# Patient Record
Sex: FEMALE | Race: WHITE | Employment: OTHER | URBAN - METROPOLITAN AREA
[De-identification: names, ages, dates, MRNs, and addresses within clinical notes are randomized per-mention and may not be internally consistent; named-entity substitution may affect disease eponyms.]

---

## 2017-10-15 ENCOUNTER — APPOINTMENT (OUTPATIENT)
Dept: GENERAL RADIOLOGY | Age: 82
DRG: 375 | End: 2017-10-15
Attending: EMERGENCY MEDICINE
Payer: MEDICARE

## 2017-10-15 ENCOUNTER — APPOINTMENT (OUTPATIENT)
Dept: CT IMAGING | Age: 82
DRG: 375 | End: 2017-10-15
Attending: EMERGENCY MEDICINE
Payer: MEDICARE

## 2017-10-15 ENCOUNTER — HOSPITAL ENCOUNTER (INPATIENT)
Age: 82
LOS: 5 days | Discharge: HOME HEALTH | DRG: 375 | End: 2017-10-20
Attending: EMERGENCY MEDICINE | Admitting: INTERNAL MEDICINE
Payer: MEDICARE

## 2017-10-15 DIAGNOSIS — R18.8 OTHER ASCITES: Primary | ICD-10-CM

## 2017-10-15 DIAGNOSIS — J90 PLEURAL EFFUSION: ICD-10-CM

## 2017-10-15 DIAGNOSIS — Z91.89 AT HIGH RISK FOR BREAST CANCER: ICD-10-CM

## 2017-10-15 DIAGNOSIS — J18.9 PNEUMONIA OF LEFT LOWER LOBE DUE TO INFECTIOUS ORGANISM: ICD-10-CM

## 2017-10-15 LAB
ALBUMIN SERPL-MCNC: 3.2 G/DL (ref 3.5–5)
ALBUMIN/GLOB SERPL: 0.9 {RATIO} (ref 1.1–2.2)
ALP SERPL-CCNC: 64 U/L (ref 45–117)
ALT SERPL-CCNC: 18 U/L (ref 12–78)
ANION GAP SERPL CALC-SCNC: 7 MMOL/L (ref 5–15)
APPEARANCE UR: ABNORMAL
AST SERPL-CCNC: 18 U/L (ref 15–37)
BACTERIA URNS QL MICRO: ABNORMAL /HPF
BASOPHILS # BLD: 0 K/UL (ref 0–0.1)
BASOPHILS NFR BLD: 0 % (ref 0–1)
BILIRUB SERPL-MCNC: 0.7 MG/DL (ref 0.2–1)
BILIRUB UR QL CFM: NEGATIVE
BNP SERPL-MCNC: 532 PG/ML (ref 0–450)
BUN SERPL-MCNC: 20 MG/DL (ref 6–20)
BUN/CREAT SERPL: 15 (ref 12–20)
CALCIUM SERPL-MCNC: 8.5 MG/DL (ref 8.5–10.1)
CHLORIDE SERPL-SCNC: 104 MMOL/L (ref 97–108)
CK MB CFR SERPL CALC: NORMAL % (ref 0–2.5)
CK MB SERPL-MCNC: <1 NG/ML (ref 5–25)
CK SERPL-CCNC: 45 U/L (ref 26–192)
CO2 SERPL-SCNC: 27 MMOL/L (ref 21–32)
COLOR UR: ABNORMAL
CREAT SERPL-MCNC: 1.31 MG/DL (ref 0.55–1.02)
EOSINOPHIL # BLD: 0.3 K/UL (ref 0–0.4)
EOSINOPHIL NFR BLD: 5 % (ref 0–7)
EPITH CASTS URNS QL MICRO: ABNORMAL /LPF
ERYTHROCYTE [DISTWIDTH] IN BLOOD BY AUTOMATED COUNT: 14.1 % (ref 11.5–14.5)
GLOBULIN SER CALC-MCNC: 3.7 G/DL (ref 2–4)
GLUCOSE SERPL-MCNC: 109 MG/DL (ref 65–100)
GLUCOSE UR STRIP.AUTO-MCNC: NEGATIVE MG/DL
HCT VFR BLD AUTO: 36.2 % (ref 35–47)
HGB BLD-MCNC: 11.5 G/DL (ref 11.5–16)
HGB UR QL STRIP: NEGATIVE
KETONES UR QL STRIP.AUTO: NEGATIVE MG/DL
LACTATE SERPL-SCNC: 2.4 MMOL/L (ref 0.4–2)
LACTATE SERPL-SCNC: 2.7 MMOL/L (ref 0.4–2)
LEUKOCYTE ESTERASE UR QL STRIP.AUTO: NEGATIVE
LYMPHOCYTES # BLD: 1.5 K/UL (ref 0.8–3.5)
LYMPHOCYTES NFR BLD: 26 % (ref 12–49)
MAGNESIUM SERPL-MCNC: 2 MG/DL (ref 1.6–2.4)
MCH RBC QN AUTO: 29.5 PG (ref 26–34)
MCHC RBC AUTO-ENTMCNC: 31.8 G/DL (ref 30–36.5)
MCV RBC AUTO: 92.8 FL (ref 80–99)
MONOCYTES # BLD: 0.4 K/UL (ref 0–1)
MONOCYTES NFR BLD: 6 % (ref 5–13)
MUCOUS THREADS URNS QL MICRO: ABNORMAL /LPF
NEUTS SEG # BLD: 3.8 K/UL (ref 1.8–8)
NEUTS SEG NFR BLD: 63 % (ref 32–75)
NITRITE UR QL STRIP.AUTO: NEGATIVE
PH UR STRIP: 6 [PH] (ref 5–8)
PLATELET # BLD AUTO: 370 K/UL (ref 150–400)
POTASSIUM SERPL-SCNC: 3.8 MMOL/L (ref 3.5–5.1)
PROT SERPL-MCNC: 6.9 G/DL (ref 6.4–8.2)
PROT UR STRIP-MCNC: ABNORMAL MG/DL
RBC # BLD AUTO: 3.9 M/UL (ref 3.8–5.2)
RBC #/AREA URNS HPF: ABNORMAL /HPF (ref 0–5)
SODIUM SERPL-SCNC: 138 MMOL/L (ref 136–145)
SP GR UR REFRACTOMETRY: 1.03 (ref 1–1.03)
TROPONIN I SERPL-MCNC: <0.04 NG/ML
UROBILINOGEN UR QL STRIP.AUTO: 1 EU/DL (ref 0.2–1)
WBC # BLD AUTO: 6 K/UL (ref 3.6–11)
WBC URNS QL MICRO: ABNORMAL /HPF (ref 0–4)

## 2017-10-15 PROCEDURE — 83605 ASSAY OF LACTIC ACID: CPT | Performed by: EMERGENCY MEDICINE

## 2017-10-15 PROCEDURE — 84484 ASSAY OF TROPONIN QUANT: CPT | Performed by: EMERGENCY MEDICINE

## 2017-10-15 PROCEDURE — 94762 N-INVAS EAR/PLS OXIMTRY CONT: CPT

## 2017-10-15 PROCEDURE — 94640 AIRWAY INHALATION TREATMENT: CPT

## 2017-10-15 PROCEDURE — 93005 ELECTROCARDIOGRAM TRACING: CPT

## 2017-10-15 PROCEDURE — 96367 TX/PROPH/DG ADDL SEQ IV INF: CPT

## 2017-10-15 PROCEDURE — 77030029684 HC NEB SM VOL KT MONA -A

## 2017-10-15 PROCEDURE — 82550 ASSAY OF CK (CPK): CPT | Performed by: EMERGENCY MEDICINE

## 2017-10-15 PROCEDURE — 74011250636 HC RX REV CODE- 250/636

## 2017-10-15 PROCEDURE — 65270000029 HC RM PRIVATE

## 2017-10-15 PROCEDURE — 85025 COMPLETE CBC W/AUTO DIFF WBC: CPT | Performed by: EMERGENCY MEDICINE

## 2017-10-15 PROCEDURE — 96365 THER/PROPH/DIAG IV INF INIT: CPT

## 2017-10-15 PROCEDURE — 74011250636 HC RX REV CODE- 250/636: Performed by: EMERGENCY MEDICINE

## 2017-10-15 PROCEDURE — 36415 COLL VENOUS BLD VENIPUNCTURE: CPT | Performed by: EMERGENCY MEDICINE

## 2017-10-15 PROCEDURE — 96375 TX/PRO/DX INJ NEW DRUG ADDON: CPT

## 2017-10-15 PROCEDURE — 74011000250 HC RX REV CODE- 250: Performed by: EMERGENCY MEDICINE

## 2017-10-15 PROCEDURE — 83880 ASSAY OF NATRIURETIC PEPTIDE: CPT | Performed by: EMERGENCY MEDICINE

## 2017-10-15 PROCEDURE — 87040 BLOOD CULTURE FOR BACTERIA: CPT | Performed by: EMERGENCY MEDICINE

## 2017-10-15 PROCEDURE — 80053 COMPREHEN METABOLIC PANEL: CPT | Performed by: EMERGENCY MEDICINE

## 2017-10-15 PROCEDURE — 74011000258 HC RX REV CODE- 258: Performed by: EMERGENCY MEDICINE

## 2017-10-15 PROCEDURE — 83735 ASSAY OF MAGNESIUM: CPT | Performed by: EMERGENCY MEDICINE

## 2017-10-15 PROCEDURE — 74176 CT ABD & PELVIS W/O CONTRAST: CPT

## 2017-10-15 PROCEDURE — 81001 URINALYSIS AUTO W/SCOPE: CPT | Performed by: EMERGENCY MEDICINE

## 2017-10-15 PROCEDURE — 71010 XR CHEST PORT: CPT

## 2017-10-15 PROCEDURE — 99285 EMERGENCY DEPT VISIT HI MDM: CPT

## 2017-10-15 RX ORDER — OXYCODONE AND ACETAMINOPHEN 5; 325 MG/1; MG/1
1 TABLET ORAL
Status: DISCONTINUED | OUTPATIENT
Start: 2017-10-15 | End: 2017-10-20 | Stop reason: HOSPADM

## 2017-10-15 RX ORDER — SODIUM CHLORIDE 0.9 % (FLUSH) 0.9 %
5-10 SYRINGE (ML) INJECTION AS NEEDED
Status: DISCONTINUED | OUTPATIENT
Start: 2017-10-15 | End: 2017-10-20 | Stop reason: HOSPADM

## 2017-10-15 RX ORDER — SODIUM CHLORIDE 0.9 % (FLUSH) 0.9 %
5-10 SYRINGE (ML) INJECTION EVERY 8 HOURS
Status: DISCONTINUED | OUTPATIENT
Start: 2017-10-15 | End: 2017-10-20 | Stop reason: HOSPADM

## 2017-10-15 RX ORDER — AZITHROMYCIN 250 MG/1
250 TABLET, FILM COATED ORAL DAILY
Status: DISCONTINUED | OUTPATIENT
Start: 2017-10-16 | End: 2017-10-18

## 2017-10-15 RX ORDER — ALBUTEROL SULFATE 2.5 MG/.5ML
5 SOLUTION RESPIRATORY (INHALATION)
Status: COMPLETED | OUTPATIENT
Start: 2017-10-15 | End: 2017-10-15

## 2017-10-15 RX ORDER — ONDANSETRON 2 MG/ML
INJECTION INTRAMUSCULAR; INTRAVENOUS
Status: COMPLETED
Start: 2017-10-15 | End: 2017-10-15

## 2017-10-15 RX ORDER — PANTOPRAZOLE SODIUM 40 MG/1
40 TABLET, DELAYED RELEASE ORAL DAILY
Status: DISCONTINUED | OUTPATIENT
Start: 2017-10-16 | End: 2017-10-20 | Stop reason: HOSPADM

## 2017-10-15 RX ORDER — IPRATROPIUM BROMIDE AND ALBUTEROL SULFATE 2.5; .5 MG/3ML; MG/3ML
3 SOLUTION RESPIRATORY (INHALATION)
Status: DISCONTINUED | OUTPATIENT
Start: 2017-10-15 | End: 2017-10-17

## 2017-10-15 RX ORDER — NALOXONE HYDROCHLORIDE 0.4 MG/ML
0.4 INJECTION, SOLUTION INTRAMUSCULAR; INTRAVENOUS; SUBCUTANEOUS AS NEEDED
Status: DISCONTINUED | OUTPATIENT
Start: 2017-10-15 | End: 2017-10-20 | Stop reason: HOSPADM

## 2017-10-15 RX ORDER — ACETAMINOPHEN 325 MG/1
650 TABLET ORAL
Status: DISCONTINUED | OUTPATIENT
Start: 2017-10-15 | End: 2017-10-20 | Stop reason: HOSPADM

## 2017-10-15 RX ORDER — ONDANSETRON 2 MG/ML
4 INJECTION INTRAMUSCULAR; INTRAVENOUS
Status: DISCONTINUED | OUTPATIENT
Start: 2017-10-15 | End: 2017-10-20 | Stop reason: HOSPADM

## 2017-10-15 RX ORDER — AMLODIPINE BESYLATE 2.5 MG/1
2.5 TABLET ORAL DAILY
Status: DISCONTINUED | OUTPATIENT
Start: 2017-10-16 | End: 2017-10-20 | Stop reason: HOSPADM

## 2017-10-15 RX ADMIN — CEFTRIAXONE 1 G: 1 INJECTION, POWDER, FOR SOLUTION INTRAMUSCULAR; INTRAVENOUS at 17:32

## 2017-10-15 RX ADMIN — ONDANSETRON 4 MG: 2 INJECTION INTRAMUSCULAR; INTRAVENOUS at 18:57

## 2017-10-15 RX ADMIN — AZITHROMYCIN MONOHYDRATE 500 MG: 500 INJECTION, POWDER, LYOPHILIZED, FOR SOLUTION INTRAVENOUS at 18:27

## 2017-10-15 RX ADMIN — ALBUTEROL SULFATE 5 MG: 2.5 SOLUTION RESPIRATORY (INHALATION) at 12:53

## 2017-10-15 RX ADMIN — SODIUM CHLORIDE 2700 ML: 900 INJECTION, SOLUTION INTRAVENOUS at 19:03

## 2017-10-15 NOTE — IP AVS SNAPSHOT
[address] [facility] 
[phone] Patient: Addison Wahl MRN: [id number] :[id number] You are allergic to the following Allergen Reactions Valsartan Other (comments)  
 malaise Immunizations Administered for This Admission Name Date Influenza Vaccine (Quad) PF  Deferred () Recent Documentation Height Weight BMI OB Status Smoking Status 1.6 m 83.1 kg 32.45 kg/m2 Hysterectomy Never Smoker Emergency Contacts  (Rel.) Home Phone Work Phone Mobile Phone Asif Carl (Child) [phone] -- --  
 Ana Fisher [phone] -- -- About your hospitalization You were admitted on:  October 15, 2017 You last received care in the:  [address] You were discharged on:  2017 Why you were hospitalized Your primary diagnosis was:  Not on File Your diagnoses also included:  Ascites Providers Seen During Your Hospitalizations Provider Role Specialty Primary office phone Miguel Gonzalez DO Attending Provider Emergency Medicine [phone] Sarah Rodriguez MD Attending Provider Hospitalist [phone] Kamryn Mckeon MD Attending Provider Internal Medicine [phone] Your Primary Care Physician (PCP) Primary Care Physician Office Phone Office Fax Lauren Wagner [phone] Follow-up Information Follow up With Details Comments Contact Info Gennaro Aguayo MD In 1 week As needed [address] [facility] 
[phone] Wilber Rincon MD On 2017 for Chemotherapy plans [address] [place] [address] 2 [address] [facility] 
[phone] Ama Dyer MD In 3 days outpatient biopsy of omental nodule with a repeat paracentesis in IR for early next week  [address] [facility] 
[phone] Current Discharge Medication List  
  
CONTINUE these medications which have CHANGED Dose & Instructions Dispensing Information Comments Morning Noon Evening Bedtime  
 furosemide 40 mg tablet Commonly known as:  LASIX What changed:   
- medication strength 
- how much to take - when to take this Your last dose was: Your next dose is:    
   
   
 Dose:  40 mg Take 1 Tab by mouth daily. Quantity:  30 Tab Refills:  0 CONTINUE these medications which have NOT CHANGED Dose & Instructions Dispensing Information Comments Morning Noon Evening Bedtime  
 amLODIPine 2.5 mg tablet Commonly known as:  Kaci Citron Your last dose was: Your next dose is:    
   
   
 Dose:  2.5 mg Take 2.5 mg by mouth daily. Refills:  0  
     
   
   
   
  
 aspirin 81 mg chewable tablet Your last dose was: Your next dose is:    
   
   
 Dose:  81 mg Take 81 mg by mouth daily. Refills:  0  
     
   
   
   
  
 FISH OIL 1,000 mg Cap Generic drug:  omega-3 fatty acids-vitamin e Your last dose was: Your next dose is:    
   
   
 Dose:  1 Cap Take 1 Cap by mouth daily. Refills:  0 KLOR-CON 10 10 mEq tablet Generic drug:  potassium chloride SR Your last dose was: Your next dose is:    
   
   
 Dose:  10 mEq Take 10 mEq by mouth daily. Refills:  0  
     
   
   
   
  
 magnesium oxide 400 mg tablet Commonly known as:  MAG-OX Your last dose was: Your next dose is:    
   
   
 Dose:  400 mg Take 400 mg by mouth daily. Refills:  0 PROTONIX 40 mg tablet Generic drug:  pantoprazole Your last dose was: Your next dose is:    
   
   
 Dose:  40 mg Take 40 mg by mouth daily. Refills:  0 Vytorin 10/10 10-10 mg per tablet Generic drug:  ezetimibe-simvastatin Your last dose was: Your next dose is:    
   
   
 Dose:  1 Tab Take 1 Tab by mouth nightly. Refills:  0 Where to Get Your Medications Information on where to get these meds will be given to you by the nurse or doctor. ! Ask your nurse or doctor about these medications  
  furosemide 40 mg tablet Discharge Instructions HOSPITALIST DISCHARGE INSTRUCTIONS 
 
NAME: Gildardo Deluca :  1927 MRN:  [id number] Date/Time:  10/20/2017 4:17 PM 
 
ADMIT DATE: 10/15/2017 DISCHARGE DATE: 10/20/2017 DISCHARGE DIAGNOSIS: 
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 
HLD  
GERD Active Problems: 
  Ascites (10/15/2017) MEDICATIONS: 
As per medication reconciliation  list 
· It is important that you take the medication exactly as they are prescribed. · Keep your medication in the bottles provided by the pharmacist and keep a list of the medication names, dosages, and times to be taken in your wallet. · Do not take other medications without consulting your doctor. Pain Management: per above medications What to do at [facility] Recommended diet:  Cardiac Diet Recommended activity: Activity as tolerated If you have questions regarding the hospital related prescriptions or hospital related issues please call Jorge Alberto Eagle at . If you experience any of the following symptoms then please call your primary care physician or return to the emergency room if you cannot get hold of your doctor: Fever, chills, nausea, vomiting, diarrhea, change in mentation, falling, bleeding, shortness of breath, Follow Up: 
Dr. Kasandra Escobar MD  you are to call and set up an appointment to see them in 7-10 days. Dr Miguelina Monroe (GYN/Oncology)- F/u in the office on Thursday 11/2 and plan for possible treatment that day with carboplatin AUC5. Dr Sena Bazzi (Medical Oncologist)- for OP Biopsy & Paracentesis as scheduled on tuesday Information obtained by : 
I understand that if any problems occur once I am at home I am to contact my physician. I understand and acknowledge receipt of the instructions indicated above. Physician's or R.N.'s Signature                                                                  Date/Time Patient or Representative Signature                                                          Date/Time Discharge Orders None [facility] Announcement We are excited to announce that we are making your provider's discharge notes available to you in [employer]. You will see these notes when they are completed and signed by the physician that discharged you from your recent hospital stay. If you have any questions or concerns about any information you see in [facility], please call the Health Information Department where you were seen or reach out to your Primary Care Provider for more information about your plan of care. Introducing [facility]! Dear Mike Carter: Thank you for requesting a [employer]ide[employer] account. Our records indicate that you have previously registered for a [employer]ide[employer] account but its currently inactive. Please call our [employer] support line at [phone]. Additional Information If you have questions, please visit the Frequently Asked Questions section of the MyChart website at [email or web address] Remember, MyChart is NOT to be used for urgent needs. For medical emergencies, dial 911. Now available from your iPhone and Android! General Information Please provide this summary of care documentation to your next provider. Patient Signature:  ____________________________________________________________ Date:  ____________________________________________________________  
  
Casandra Jones Provider Signature:  ____________________________________________________________ Date:  ____________________________________________________________

## 2017-10-15 NOTE — ED NOTES
Pt presents to ED for cough, shortness of breath, swelling and lack of appetite x week. Pt reports she went to urgent care recently and they gave her a fluid pill and sent her home but she reports no improvement. Pt alert and oriented x 4.

## 2017-10-15 NOTE — ED PROVIDER NOTES
[facility] [address]  EMERGENCY DEPARTMENT HISTORY AND PHYSICAL EXAM       Date of Service: 10/15/2017   Patient Name: Morales Velarde   YOB: 1927  Medical Record Number: [id number]    History of Presenting Illness     Chief Complaint   Patient presents with    Cough     reports cough with wheezing and shortness of breath for 2-3 weeks    Shortness of Breath    Abdominal Pain     generalized abdominal pain and decreased appetite        History Provided By:  Patient and grandson     Additional History:   Morales Velarde is a 80 y.o. female with PMhx significant for HTN, hypercholesteremia, CKD, GERD, and a heart murmur who presents ambulatory to the ED with cc of a decreased appetite, dry cough, wheezing, SOB and generalized weakness that began 3 weeks ago but have been progressively worsening. The pt's grandson reports that the pt was seen at an urgent care center 3 days ago, where she had an xray, and she was sent home on Lasix. The pt states that her sx are worse with movement. The pt's grandson notes that the pt has only had two doses of Lasix so far. Pt denies any improvement of her sx after having been placed on Lasix. She denies improvement of her SOB with laying flat. She denies fever, Hx of heart problems, abdominal pain, taking any lasix today, having a Hx of liver or lung problems. Social Hx: - Tobacco, - EtOH, - Illicit Drugs    There are no other complaints, changes or physical findings at this time. Primary Care Provider: Eriberto Abbasi MD       Past History     Past Medical History:   Past Medical History:   Diagnosis Date    Autoimmune disease ([facility] [address]     Chronic kidney disease     GERD (gastroesophageal reflux disease)     Hypertension     Murmur         Past Surgical History:   Past Surgical History:   Procedure Laterality Date    HX HYSTERECTOMY      HX OTHER SURGICAL      Parathyroidectomy         Family History:   History reviewed.  No pertinent family history. Social History:   Social History   Substance Use Topics    Smoking status: Never Smoker    Smokeless tobacco: Never Used    Alcohol use No        Allergies: Allergies   Allergen Reactions    Valsartan Other (comments)     malaise         Review of Systems   Review of Systems   Constitutional: Positive for appetite change (decreased). Negative for chills, fatigue and fever. HENT: Negative. Negative for congestion, rhinorrhea, sinus pressure and sore throat. Eyes: Negative. Respiratory: Positive for cough, shortness of breath and wheezing. Negative for choking and chest tightness. Cardiovascular: Negative. Negative for chest pain, palpitations and leg swelling. Gastrointestinal: Negative for abdominal pain, constipation, diarrhea, nausea and vomiting. Endocrine: Negative. Genitourinary: Negative. Negative for difficulty urinating, dysuria, flank pain and urgency. Musculoskeletal: Negative. Skin: Negative. Neurological: Negative. Negative for dizziness, speech difficulty, weakness, light-headedness, numbness and headaches. Psychiatric/Behavioral: Negative. All other systems reviewed and are negative. Physical Exam  Physical Exam   Constitutional: She is oriented to person, place, and time. She appears well-developed and well-nourished. She appears distressed. HENT:   Head: Normocephalic and atraumatic. Mouth/Throat: Oropharynx is clear and moist. No oropharyngeal exudate. Eyes: Conjunctivae and EOM are normal. Pupils are equal, round, and reactive to light. Neck: Normal range of motion. Neck supple. No JVD present. No tracheal deviation present. Cardiovascular: Normal rate, regular rhythm and intact distal pulses. Murmur heard. Pulmonary/Chest: No stridor. She has no wheezes. She has no rales. She exhibits no tenderness. Increase work of breathing, diminished in LLLL   Abdominal: Soft.  Bowel sounds are normal. She exhibits distension. There is no tenderness. There is no rebound and no guarding. Musculoskeletal: Normal range of motion. She exhibits no edema or tenderness. Neurological: She is alert and oriented to person, place, and time. No cranial nerve deficit. No gross motor or sensory deficits    Skin: Skin is warm and dry. She is not diaphoretic. Psychiatric: She has a normal mood and affect. Her behavior is normal.   Nursing note and vitals reviewed. Medical Decision Making   I am the first provider for this patient. I reviewed the vital signs, available nursing notes, past medical history, past surgical history, family history and social history. Old Medical Records: Old medical records. Previous electrocardiograms. Nursing notes. Provider Notes:   DDx: new onset of heart failure, bronchitis, PNA, renal failure    ED Course:  2:18 PM   Initial assessment performed. The patients presenting problems have been discussed, and they are in agreement with the care plan formulated and outlined with them. I have encouraged them to ask questions as they arise throughout their visit. Initially, pt with increase work of breathing, CXR did not demonstrate reason for this. Discussed with pt and family, further diagnostic work-up, including CT A/P and possibly VQ scan, CT A/P shows large volume of ascites, there is a pleural effusion and possible atelectasis versus infiltrate. Some of pt symptoms do support pneumonia, productive sputum and cold symptoms. NT-BNP slightly elevated, will start IV Ab, order Blood Cx, pt will be admitted for further tx and work-up with concern of ascites related to malignancy. Donna Guevara DO    CONSULT NOTE:   5:14 PM  Louie Baez DO spoke with Dr. Templeton Nurse,   Specialty: Hospitalist  Discussed pt's hx, disposition, and available diagnostic and imaging results. Reviewed care plans. Consultant will evaluate pt for admission.   Written by Kristi Maciel ED Scribe, as dictated by Zhane Degroot [facility]. Diagnostic Study Results     Labs -      Recent Results (from the past 12 hour(s))   EKG, 12 LEAD, INITIAL    Collection Time: 10/15/17 11:52 AM   Result Value Ref Range    Ventricular Rate 89 BPM    Atrial Rate 89 BPM    P-R Interval 150 ms    QRS Duration 86 ms    Q-T Interval 358 ms    QTC Calculation (Bezet) 435 ms    Calculated P Axis 43 degrees    Calculated R Axis -13 degrees    Calculated T Axis 24 degrees    Diagnosis       Sinus rhythm with premature supraventricular complexes  Possible Anterior infarct , age undetermined  Abnormal ECG  No previous ECGs available     CBC WITH AUTOMATED DIFF    Collection Time: 10/15/17 12:57 PM   Result Value Ref Range    WBC 6.0 3.6 - 11.0 K/uL    RBC 3.90 3.80 - 5.20 M/uL    HGB 11.5 11.5 - 16.0 g/dL    HCT 36.2 35.0 - 47.0 %    MCV 92.8 80.0 - 99.0 FL    MCH 29.5 26.0 - 34.0 PG    MCHC 31.8 30.0 - 36.5 g/dL    RDW 14.1 11.5 - 14.5 %    PLATELET 432 022 - 539 K/uL    NEUTROPHILS 63 32 - 75 %    LYMPHOCYTES 26 12 - 49 %    MONOCYTES 6 5 - 13 %    EOSINOPHILS 5 0 - 7 %    BASOPHILS 0 0 - 1 %    ABS. NEUTROPHILS 3.8 1.8 - 8.0 K/UL    ABS. LYMPHOCYTES 1.5 0.8 - 3.5 K/UL    ABS. MONOCYTES 0.4 0.0 - 1.0 K/UL    ABS. EOSINOPHILS 0.3 0.0 - 0.4 K/UL    ABS. BASOPHILS 0.0 0.0 - 0.1 K/UL   METABOLIC PANEL, COMPREHENSIVE    Collection Time: 10/15/17 12:57 PM   Result Value Ref Range    Sodium 138 136 - 145 mmol/L    Potassium 3.8 3.5 - 5.1 mmol/L    Chloride 104 97 - 108 mmol/L    CO2 27 21 - 32 mmol/L    Anion gap 7 5 - 15 mmol/L    Glucose 109 (H) 65 - 100 mg/dL    BUN 20 6 - 20 MG/DL    Creatinine 1.31 (H) 0.55 - 1.02 MG/DL    BUN/Creatinine ratio 15 12 - 20      GFR est AA 46 (L) >60 ml/min/1.73m2    GFR est non-AA 38 (L) >60 ml/min/1.73m2    Calcium 8.5 8.5 - 10.1 MG/DL    Bilirubin, total 0.7 0.2 - 1.0 MG/DL    ALT (SGPT) 18 12 - 78 U/L    AST (SGOT) 18 15 - 37 U/L    Alk.  phosphatase 64 45 - 117 U/L    Protein, total 6.9 6.4 - 8.2 g/dL    Albumin 3.2 (L) 3.5 - 5.0 g/dL    Globulin 3.7 2.0 - 4.0 g/dL    A-G Ratio 0.9 (L) 1.1 - 2.2     CK W/ CKMB & INDEX    Collection Time: 10/15/17 12:57 PM   Result Value Ref Range    CK 45 26 - 192 U/L    CK - MB <1.0 <3.6 NG/ML    CK-MB Index Cannot be calculated 0 - 2.5     NT-PRO BNP    Collection Time: 10/15/17 12:57 PM   Result Value Ref Range    NT pro- (H) 0 - 450 PG/ML   MAGNESIUM    Collection Time: 10/15/17 12:57 PM   Result Value Ref Range    Magnesium 2.0 1.6 - 2.4 mg/dL   TROPONIN I    Collection Time: 10/15/17 12:57 PM   Result Value Ref Range    Troponin-I, Qt. <0.04 <0.05 ng/mL       Radiologic Studies -  The following have been ordered and reviewed:  XR CHEST PORT   Final Result      EXAM:  CT ABD PELV WO CONT     INDICATION: Abd pain, decrease appetite, abd distension     COMPARISON: None     CONTRAST:  None.     TECHNIQUE:   Thin axial images were obtained through the abdomen and pelvis. Coronal and  sagittal reconstructions were generated. Oral contrast was not, per order of the  ordering physician administered. CT dose reduction was achieved through use of a  standardized protocol tailored for this examination and automatic exposure  control for dose modulation.      The absence of intravenous and oral contrast material reduces the capacity of CT  to evaluate the bowel as well as the solid parenchymal organs of the abdomen.      FINDINGS:   LUNG BASES: Small to moderate left pleural effusion. Patchy inferior lingular  and posterior left basilar atelectasis versus pneumonia. INCIDENTALLY IMAGED HEART AND MEDIASTINUM: Atherosclerotic calcifications. Normal cardiac size. No pericardial effusion. LIVER: Nonspecific 5 mm calcifications at apex of caudate lobe and medial  segment of the left hepatic lobe. . No biliary duct dilation. GALLBLADDER: Unremarkable. SPLEEN: No mass. PANCREAS: No mass or ductal dilatation. ADRENALS: Unremarkable. KIDNEYS/URETERS: No mass, calculus, or hydronephrosis.  3 cysts bilaterally, the  largest measuring 4.5 cm in size. STOMACH: Nondistended. SMALL BOWEL: Nondistended. COLON: Nondistended. APPENDIX: Not demonstrated. PERITONEUM: Moderate to large amount of ascites with numerous marginal nodular  opacities. Nodular opacification extends into the adjacent omentum, transverse  mesocolon and possibly small bowel mesentery. RETROPERITONEUM: No retroperitoneal mass or adenopathy demonstrated. Aortoiliac  vascular calcifications though no aneurysm. REPRODUCTIVE ORGANS: Status post hysterectomy. URINARY BLADDER: Poorly assessed on this study due to adjacent ascites. BONES: No lytic or sclerotic osseous mass lesion. Moderate lower lumbar spine  degenerative changes. ADDITIONAL COMMENTS: N/A     IMPRESSION  IMPRESSION:     1. Moderate to large amount of ascites with numerous ascitic nodules as well as  omental and transverse mesocolic caking. 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 2. Small to moderate left pleural effusion. Patchy left basilar atelectasis  versus pneumonia. EXAM:  XR CHEST PORT     INDICATION:  Chest pain     COMPARISON:  None.     FINDINGS: A portable AP radiograph of the chest was obtained at 1326 hours. There is no pneumothorax or pleural effusion. The lungs are clear. Cardiac  size is within normal limits. There is mild atherosclerotic calcification of the  thoracic aortic arch. Mediastinal and hilar contours appear normal.  The bones  are moderately to severely osteopenic.      IMPRESSION  IMPRESSION: No acute findings. CT Results  (Last 48 hours)               10/15/17 1548  CT ABD PELV WO CONT Final result    Impression:  IMPRESSION:       1. Moderate to large amount of ascites with numerous ascitic nodules as well as   omental and transverse mesocolic caking. Fine is consistent with primary or   metastatic neoplastic disease. 2. Small to moderate left pleural effusion.  Patchy left basilar atelectasis   versus pneumonia. Narrative:  EXAM:  CT ABD PELV WO CONT       INDICATION: Abd pain, decrease appetite, abd distension       COMPARISON: None       CONTRAST:  None. TECHNIQUE:    Thin axial images were obtained through the abdomen and pelvis. Coronal and   sagittal reconstructions were generated. Oral contrast was not, per order of the   ordering physician administered. CT dose reduction was achieved through use of a   standardized protocol tailored for this examination and automatic exposure   control for dose modulation. The absence of intravenous and oral contrast material reduces the capacity of CT   to evaluate the bowel as well as the solid parenchymal organs of the abdomen. FINDINGS:    LUNG BASES: Small to moderate left pleural effusion. Patchy inferior lingular   and posterior left basilar atelectasis versus pneumonia. INCIDENTALLY IMAGED HEART AND MEDIASTINUM: Atherosclerotic calcifications. Normal cardiac size. No pericardial effusion. LIVER: Nonspecific 5 mm calcifications at apex of caudate lobe and medial   segment of the left hepatic lobe. . No biliary duct dilation. GALLBLADDER: Unremarkable. SPLEEN: No mass. PANCREAS: No mass or ductal dilatation. ADRENALS: Unremarkable. KIDNEYS/URETERS: No mass, calculus, or hydronephrosis. 3 cysts bilaterally, the   largest measuring 4.5 cm in size. STOMACH: Nondistended. SMALL BOWEL: Nondistended. COLON: Nondistended. APPENDIX: Not demonstrated. PERITONEUM: Moderate to large amount of ascites with numerous marginal nodular   opacities. Nodular opacification extends into the adjacent omentum, transverse   mesocolon and possibly small bowel mesentery. RETROPERITONEUM: No retroperitoneal mass or adenopathy demonstrated. Aortoiliac   vascular calcifications though no aneurysm. REPRODUCTIVE ORGANS: Status post hysterectomy. URINARY BLADDER: Poorly assessed on this study due to adjacent ascites.    BONES: No lytic or sclerotic osseous mass lesion. Moderate lower lumbar spine   degenerative changes. ADDITIONAL COMMENTS: N/A               CXR Results  (Last 48 hours)               10/15/17 1342  XR CHEST PORT Final result    Impression:  IMPRESSION: No acute findings. Narrative:  EXAM:  XR CHEST PORT       INDICATION:  Chest pain       COMPARISON:  None. FINDINGS: A portable AP radiograph of the chest was obtained at 1326 hours. There is no pneumothorax or pleural effusion. The lungs are clear. Cardiac   size is within normal limits. There is mild atherosclerotic calcification of the   thoracic aortic arch. Mediastinal and hilar contours appear normal.  The bones   are moderately to severely osteopenic. Vital Signs-Reviewed the patient's vital signs.    Patient Vitals for the past 12 hrs:   Temp Pulse Resp BP SpO2   10/15/17 2030 - 91 26 116/54 96 %   10/15/17 1945 - 94 23 120/52 98 %   10/15/17 1900 - 97 27 132/61 92 %   10/15/17 1815 - 94 22 141/56 95 %   10/15/17 1730 - 92 21 117/48 97 %   10/15/17 1645 - (!) 101 16 154/58 98 %   10/15/17 1600 - 95 19 137/55 99 %   10/15/17 1555 - 98 18 156/65 98 %   10/15/17 1524 - 98 18 189/87 95 %   10/15/17 1445 - 97 19 171/76 98 %   10/15/17 1330 - 98 17 141/61 99 %   10/15/17 1245 - 88 25 140/52 96 %   10/15/17 1200 - 89 25 154/65 96 %   10/15/17 1158 98.5 °F (36.9 °C) 90 26 154/69 98 %   10/15/17 1156 - 94 20 154/69 -       Medications Given in the ED:  Medications   sodium chloride (NS) flush 5-10 mL (not administered)   sodium chloride (NS) flush 5-10 mL (not administered)   sodium chloride 0.9 % bolus infusion 2,700 mL (2,700 mL IntraVENous New Bag 10/15/17 1903)   sodium chloride (NS) flush 5-10 mL (not administered)   sodium chloride (NS) flush 5-10 mL (not administered)   acetaminophen (TYLENOL) tablet 650 mg (not administered)   oxyCODONE-acetaminophen (PERCOCET) 5-325 mg per tablet 1 Tab (not administered)   naloxone (NARCAN) injection 0.4 mg (not administered)   ondansetron (ZOFRAN) injection 4 mg (not administered)   azithromycin (ZITHROMAX) tablet 250 mg (not administered)   cefTRIAXone (ROCEPHIN) 1 g in 0.9% sodium chloride (MBP/ADV) 50 mL (not administered)   albuterol-ipratropium (DUO-NEB) 2.5 MG-0.5 MG/3 ML (not administered)   albuterol CONCENTRATE 2.5mg/0.5 mL neb soln (5 mg Nebulization Given 10/15/17 1253)   cefTRIAXone (ROCEPHIN) 1 g in 0.9% sodium chloride (MBP/ADV) 50 mL (0 g IntraVENous IV Completed 10/15/17 1802)   azithromycin (ZITHROMAX) 500 mg in 0.9% sodium chloride (MBP/ADV) 250 mL (0 mg IntraVENous IV Completed 10/15/17 1927)   ondansetron (ZOFRAN) 4 mg/2 mL injection (4 mg  Given 10/15/17 1857)       EKG interpretation: (Preliminary)  Sinus, rate 89, normal axis/pr/qrs, NSST. Darrion Hernandez DO      Diagnosis   Clinical Impression:   1. Other ascites    2. Pleural effusion    3. Pneumonia of left lower lobe due to infectious organism [facility])         Plan:  1: [de-identified]     5:11 PM  Patient is being admitted to the hospital by Dr. Hermes Lazcano. The results of their tests and reasons for their admission have been discussed with them and/or available family. They convey agreement and understanding for the need to be admitted and for their admission diagnosis. Consultation has been made with the inpatient physician specialist for hospitalization. Written by VIDHI Bazziibkerrie, as dictated by Anibal Gallegos DO. This note is prepared by Perry Singleton, acting as Scribe for Anibal Gallegos, [address] [place] [address], DO: The scribe's documentation has been prepared under my direction and personally reviewed by me in its entirety. I confirm that the note above accurately reflects all work, treatment, procedures, and medical decision making performed by me.

## 2017-10-15 NOTE — ED NOTES
Bedside and Verbal shift change report given to Shanae1 Andrae Oquendo (oncoming nurse) by Qian Marino RN (offgoing nurse). Report included the following information SBAR and ED Summary.

## 2017-10-16 ENCOUNTER — APPOINTMENT (OUTPATIENT)
Dept: ULTRASOUND IMAGING | Age: 82
DRG: 375 | End: 2017-10-16
Attending: INTERNAL MEDICINE
Payer: MEDICARE

## 2017-10-16 LAB
ALBUMIN FLD-MCNC: 2.4 G/DL
ALBUMIN SERPL-MCNC: 2.7 G/DL (ref 3.5–5)
ALBUMIN/GLOB SERPL: 0.8 {RATIO} (ref 1.1–2.2)
ALP SERPL-CCNC: 56 U/L (ref 45–117)
ALT SERPL-CCNC: 16 U/L (ref 12–78)
ANION GAP SERPL CALC-SCNC: 8 MMOL/L (ref 5–15)
APPEARANCE FLD: ABNORMAL
AST SERPL-CCNC: 16 U/L (ref 15–37)
ATRIAL RATE: 89 BPM
BASOPHILS # BLD: 0 K/UL (ref 0–0.1)
BASOPHILS NFR BLD: 0 % (ref 0–1)
BILIRUB SERPL-MCNC: 0.5 MG/DL (ref 0.2–1)
BUN SERPL-MCNC: 19 MG/DL (ref 6–20)
BUN/CREAT SERPL: 16 (ref 12–20)
CALCIUM SERPL-MCNC: 8.2 MG/DL (ref 8.5–10.1)
CALCULATED P AXIS, ECG09: 43 DEGREES
CALCULATED R AXIS, ECG10: -13 DEGREES
CALCULATED T AXIS, ECG11: 24 DEGREES
CEA SERPL-MCNC: 0.8 NG/ML
CHLORIDE SERPL-SCNC: 106 MMOL/L (ref 97–108)
CO2 SERPL-SCNC: 27 MMOL/L (ref 21–32)
COLOR FLD: ABNORMAL
CREAT SERPL-MCNC: 1.2 MG/DL (ref 0.55–1.02)
DIAGNOSIS, 93000: NORMAL
EOSINOPHIL # BLD: 0.2 K/UL (ref 0–0.4)
EOSINOPHIL NFR BLD: 4 % (ref 0–7)
ERYTHROCYTE [DISTWIDTH] IN BLOOD BY AUTOMATED COUNT: 14.1 % (ref 11.5–14.5)
GLOBULIN SER CALC-MCNC: 3.4 G/DL (ref 2–4)
GLUCOSE SERPL-MCNC: 99 MG/DL (ref 65–100)
HCT VFR BLD AUTO: 31.5 % (ref 35–47)
HGB BLD-MCNC: 10.2 G/DL (ref 11.5–16)
LDH SERPL L TO P-CCNC: 213 U/L (ref 81–246)
LYMPHOCYTES # BLD: 1.7 K/UL (ref 0.8–3.5)
LYMPHOCYTES NFR BLD: 28 % (ref 12–49)
LYMPHOCYTES NFR FLD: 54 %
MCH RBC QN AUTO: 30.2 PG (ref 26–34)
MCHC RBC AUTO-ENTMCNC: 32.4 G/DL (ref 30–36.5)
MCV RBC AUTO: 93.2 FL (ref 80–99)
MESOTHL CELL NFR FLD: 4 %
MONOCYTES # BLD: 0.5 K/UL (ref 0–1)
MONOCYTES NFR BLD: 8 % (ref 5–13)
MONOS+MACROS NFR FLD: 35 %
NEUTROPHILS NFR FLD: 7 %
NEUTS SEG # BLD: 3.5 K/UL (ref 1.8–8)
NEUTS SEG NFR BLD: 60 % (ref 32–75)
NUC CELL # FLD: 729 /CU MM (ref 0–5)
P-R INTERVAL, ECG05: 150 MS
PLATELET # BLD AUTO: 355 K/UL (ref 150–400)
POTASSIUM SERPL-SCNC: 3.9 MMOL/L (ref 3.5–5.1)
PROT FLD-MCNC: 4.1 G/DL
PROT SERPL-MCNC: 6.1 G/DL (ref 6.4–8.2)
Q-T INTERVAL, ECG07: 358 MS
QRS DURATION, ECG06: 86 MS
QTC CALCULATION (BEZET), ECG08: 435 MS
RBC # BLD AUTO: 3.38 M/UL (ref 3.8–5.2)
RBC # FLD: >100 /CU MM
SODIUM SERPL-SCNC: 141 MMOL/L (ref 136–145)
SPECIMEN SOURCE FLD: ABNORMAL
SPECIMEN SOURCE FLD: NORMAL
SPECIMEN SOURCE FLD: NORMAL
VENTRICULAR RATE, ECG03: 89 BPM
WBC # BLD AUTO: 5.9 K/UL (ref 3.6–11)

## 2017-10-16 PROCEDURE — 74011250637 HC RX REV CODE- 250/637: Performed by: INTERNAL MEDICINE

## 2017-10-16 PROCEDURE — 80053 COMPREHEN METABOLIC PANEL: CPT | Performed by: INTERNAL MEDICINE

## 2017-10-16 PROCEDURE — 89050 BODY FLUID CELL COUNT: CPT | Performed by: INTERNAL MEDICINE

## 2017-10-16 PROCEDURE — 88112 CYTOPATH CELL ENHANCE TECH: CPT | Performed by: INTERNAL MEDICINE

## 2017-10-16 PROCEDURE — 93306 TTE W/DOPPLER COMPLETE: CPT

## 2017-10-16 PROCEDURE — 82042 OTHER SOURCE ALBUMIN QUAN EA: CPT | Performed by: INTERNAL MEDICINE

## 2017-10-16 PROCEDURE — 36415 COLL VENOUS BLD VENIPUNCTURE: CPT | Performed by: INTERNAL MEDICINE

## 2017-10-16 PROCEDURE — 83615 LACTATE (LD) (LDH) ENZYME: CPT | Performed by: INTERNAL MEDICINE

## 2017-10-16 PROCEDURE — 74011000250 HC RX REV CODE- 250: Performed by: RADIOLOGY

## 2017-10-16 PROCEDURE — 86301 IMMUNOASSAY TUMOR CA 19-9: CPT | Performed by: INTERNAL MEDICINE

## 2017-10-16 PROCEDURE — 65270000015 HC RM PRIVATE ONCOLOGY

## 2017-10-16 PROCEDURE — 74011250636 HC RX REV CODE- 250/636: Performed by: INTERNAL MEDICINE

## 2017-10-16 PROCEDURE — C1729 CATH, DRAINAGE: HCPCS

## 2017-10-16 PROCEDURE — 85025 COMPLETE CBC W/AUTO DIFF WBC: CPT | Performed by: INTERNAL MEDICINE

## 2017-10-16 PROCEDURE — 84157 ASSAY OF PROTEIN OTHER: CPT | Performed by: INTERNAL MEDICINE

## 2017-10-16 PROCEDURE — 0W9G3ZX DRAINAGE OF PERITONEAL CAVITY, PERCUTANEOUS APPROACH, DIAGNOSTIC: ICD-10-PCS | Performed by: RADIOLOGY

## 2017-10-16 PROCEDURE — 88360 TUMOR IMMUNOHISTOCHEM/MANUAL: CPT | Performed by: INTERNAL MEDICINE

## 2017-10-16 PROCEDURE — 86304 IMMUNOASSAY TUMOR CA 125: CPT | Performed by: INTERNAL MEDICINE

## 2017-10-16 PROCEDURE — 88305 TISSUE EXAM BY PATHOLOGIST: CPT | Performed by: INTERNAL MEDICINE

## 2017-10-16 PROCEDURE — 77010033678 HC OXYGEN DAILY

## 2017-10-16 PROCEDURE — 82378 CARCINOEMBRYONIC ANTIGEN: CPT | Performed by: INTERNAL MEDICINE

## 2017-10-16 PROCEDURE — 74011000258 HC RX REV CODE- 258: Performed by: INTERNAL MEDICINE

## 2017-10-16 PROCEDURE — 87205 SMEAR GRAM STAIN: CPT | Performed by: INTERNAL MEDICINE

## 2017-10-16 PROCEDURE — 88342 IMHCHEM/IMCYTCHM 1ST ANTB: CPT | Performed by: INTERNAL MEDICINE

## 2017-10-16 PROCEDURE — 86300 IMMUNOASSAY TUMOR CA 15-3: CPT | Performed by: INTERNAL MEDICINE

## 2017-10-16 RX ORDER — LIDOCAINE HYDROCHLORIDE 10 MG/ML
5 INJECTION, SOLUTION EPIDURAL; INFILTRATION; INTRACAUDAL; PERINEURAL
Status: COMPLETED | OUTPATIENT
Start: 2017-10-16 | End: 2017-10-16

## 2017-10-16 RX ORDER — FUROSEMIDE 40 MG/1
40 TABLET ORAL DAILY
Status: DISCONTINUED | OUTPATIENT
Start: 2017-10-16 | End: 2017-10-20 | Stop reason: HOSPADM

## 2017-10-16 RX ORDER — GUAIFENESIN 100 MG/5ML
400 SOLUTION ORAL 3 TIMES DAILY
Status: DISCONTINUED | OUTPATIENT
Start: 2017-10-16 | End: 2017-10-20 | Stop reason: HOSPADM

## 2017-10-16 RX ADMIN — Medication 10 ML: at 08:57

## 2017-10-16 RX ADMIN — Medication 10 ML: at 08:59

## 2017-10-16 RX ADMIN — Medication 10 ML: at 17:03

## 2017-10-16 RX ADMIN — Medication 10 ML: at 06:22

## 2017-10-16 RX ADMIN — AZITHROMYCIN 250 MG: 250 TABLET, FILM COATED ORAL at 08:55

## 2017-10-16 RX ADMIN — Medication 10 ML: at 21:33

## 2017-10-16 RX ADMIN — LIDOCAINE HYDROCHLORIDE 5 ML: 10 INJECTION, SOLUTION EPIDURAL; INFILTRATION; INTRACAUDAL; PERINEURAL at 13:00

## 2017-10-16 RX ADMIN — AMLODIPINE BESYLATE 2.5 MG: 2.5 TABLET ORAL at 08:55

## 2017-10-16 RX ADMIN — GUAIFENESIN 400 MG: 200 SOLUTION ORAL at 21:30

## 2017-10-16 RX ADMIN — FUROSEMIDE 40 MG: 40 TABLET ORAL at 17:03

## 2017-10-16 RX ADMIN — GUAIFENESIN 400 MG: 200 SOLUTION ORAL at 17:02

## 2017-10-16 RX ADMIN — CEFTRIAXONE 1 G: 1 INJECTION, POWDER, FOR SOLUTION INTRAMUSCULAR; INTRAVENOUS at 17:02

## 2017-10-16 NOTE — PROGRESS NOTES
Oncology Nursing Communication Tool      4:40 PM  10/16/2017     Bedside shift change report given to Cesilia Gutierrez RN (incoming nurse) by Hallie Soriano RN (outgoing nurse) on Germaine Goltz a 80 y.o. female who was admitted on 10/15/2017 11:47 AM. Report included the following information SBAR, Kardex, Intake/Output, MAR, Accordion and Recent Results. Significant changes during shift: Oncology consulted      Issues for physician to address:           Code Status: Full Code     Infections: No current active infections     Allergies: Valsartan     Current diet: DIET CARDIAC Regular       Pain Controlled [x] yes [] no   Bowel Movement [] yes [x] no   Last Bowel Movement (date)                 Vital Signs:   Patient Vitals for the past 12 hrs:   Temp Pulse Resp BP SpO2   10/16/17 1427 98.2 °F (36.8 °C) 85 20 137/47 95 %   10/16/17 1250 - 73 20 117/50 91 %   10/16/17 1240 - 76 20 126/49 91 %   10/16/17 1230 - 77 20 116/49 91 %   10/16/17 1220 - 78 20 127/54 91 %   10/16/17 1210 - 78 20 132/63 91 %   10/16/17 1205 - 78 20 124/51 90 %   10/16/17 0718 98.1 °F (36.7 °C) 78 20 127/55 92 %      Intake & Output:   No intake or output data in the 24 hours ending 10/16/17 1640   Laboratory Results:     Recent Results (from the past 12 hour(s))   CULTURE, BODY FLUID W GRAM STAIN    Collection Time: 10/16/17 12:30 PM   Result Value Ref Range    Special Requests: NO SPECIAL REQUESTS      GRAM STAIN NO WBC'S SEEN      GRAM STAIN NO ORGANISMS SEEN      Culture result: PENDING    PROTEIN TOTAL, FLUID    Collection Time: 10/16/17 12:30 PM   Result Value Ref Range    Fluid Type: ASCITIC FLUID       Protein total, body fld. 4.1 g/dL   ALBUMIN, FLUID    Collection Time: 10/16/17 12:30 PM   Result Value Ref Range    Fluid Type: ASCITIC FLUID       Albumin, body fld. 2.4 g/dL              Opportunity for questions and clarifications were given to the incoming nurse.  Patient's bed is in low position, side rails x2, door open PRN, call bell within reach and patient not in distress.       Antoinette Pimentel RN

## 2017-10-16 NOTE — CONSULTS
GI Consultation Note [facility] Spence)    NAME: Yulia Miller : 1927 MRN: [id number]   ATTG: Dr. Malcolm Gutierrez  PCP: Jose Lara MD   Date/Time:  10/16/2017 5:40 PM  Subjective:   REASON FOR CONSULT:        Verna Savage is a 80 y.o. W female who I was asked to see for suspected malignant ascites. She notes at least 3-4 weeks of decline, swelling in her abd, weakness. She's been healthy all her life mostly, and lives alone, cooks and has someone help with cleaning. A CT abd/pelvis on admission shows omental caking and possible malignant ascites. I shared this with her and her son and daughter in law. She feels okay, just tired. A little overwhelmed. Abd paracentesis with fluid for cytology sent this afternoon. Tells me her sister  of colon cancer 30 years ago, maybe 39. NO previous colonoscopy or GI MD.      Past Medical History:   Diagnosis Date    Autoimmune disease ([address]     Chronic kidney disease     GERD (gastroesophageal reflux disease)     Hypertension     Murmur       Past Surgical History:   Procedure Laterality Date    HX HYSTERECTOMY      HX OTHER SURGICAL      Parathyroidectomy      Social History   Substance Use Topics    Smoking status: Never Smoker    Smokeless tobacco: Never Used    Alcohol use No      History reviewed. No pertinent family history. Allergies   Allergen Reactions    Valsartan Other (comments)     malaise      Home Medications:  Prior to Admission Medications   Prescriptions Last Dose Informant Patient Reported? Taking? FUROSEMIDE PO 10/15/2017 at Unknown time  Yes Yes   Sig: Take  by mouth. amLODIPine (NORVASC) 2.5 mg tablet 10/15/2017 at Unknown time  Yes Yes   Sig: Take 2.5 mg by mouth daily. aspirin 81 mg chewable tablet 10/15/2017 at Unknown time  Yes Yes   Sig: Take 81 mg by mouth daily.    ezetimibe-simvastatin (VYTORIN 10/10) 10-10 mg per tablet 10/15/2017 at Unknown time  Yes Yes   Sig: Take 1 Tab by mouth nightly.   magnesium oxide (MAG-OX) 400 mg tablet 10/15/2017 at Unknown time  Yes Yes   Sig: Take 400 mg by mouth daily. omega-3 fatty acids-vitamin e (FISH OIL) 1,000 mg cap 10/15/2017 at Unknown time  Yes Yes   Sig: Take 1 Cap by mouth daily. pantoprazole (PROTONIX) 40 mg tablet 10/15/2017 at Unknown time  Yes Yes   Sig: Take 40 mg by mouth daily. potassium chloride SR (KLOR-CON 10) 10 mEq tablet Not Taking at Unknown time  Yes No   Sig: Take 10 mEq by mouth daily.       Facility-Administered Medications: None     Hospital medications:  Current Facility-Administered Medications   Medication Dose Route Frequency    influenza vaccine 2017-18 (3 yrs+)(PF) (FLUZONE QUAD/FLUARIX QUAD) injection 0.5 mL  0.5 mL IntraMUSCular PRIOR TO DISCHARGE    furosemide (LASIX) tablet 40 mg  40 mg Oral DAILY    guaiFENesin (ROBITUSSIN) 100 mg/5 mL oral liquid 400 mg  400 mg Oral TID    sodium chloride (NS) flush 5-10 mL  5-10 mL IntraVENous Q8H    sodium chloride (NS) flush 5-10 mL  5-10 mL IntraVENous PRN    amLODIPine (NORVASC) tablet 2.5 mg  2.5 mg Oral DAILY    pantoprazole (PROTONIX) tablet 40 mg  40 mg Oral DAILY    sodium chloride (NS) flush 5-10 mL  5-10 mL IntraVENous Q8H    sodium chloride (NS) flush 5-10 mL  5-10 mL IntraVENous PRN    acetaminophen (TYLENOL) tablet 650 mg  650 mg Oral Q4H PRN    oxyCODONE-acetaminophen (PERCOCET) 5-325 mg per tablet 1 Tab  1 Tab Oral Q4H PRN    naloxone (NARCAN) injection 0.4 mg  0.4 mg IntraVENous PRN    ondansetron (ZOFRAN) injection 4 mg  4 mg IntraVENous Q4H PRN    azithromycin (ZITHROMAX) tablet 250 mg  250 mg Oral DAILY    cefTRIAXone (ROCEPHIN) 1 g in 0.9% sodium chloride (MBP/ADV) 50 mL  1 g IntraVENous Q24H    albuterol-ipratropium (DUO-NEB) 2.5 MG-0.5 MG/3 ML  3 mL Nebulization Q4H PRN     REVIEW OF SYSTEMS:     []     Unable to obtain  ROS due to  []    mental status change  []    sedated   []    intubated   [x]    Total of 11 systems reviewed as follows:  Const:   negative fever, negative chills  Eyes:   negative diplopia or visual changes, negative eye pain  ENT:   negative coryza, negative sore throat  Resp:   negative cough, hemoptysis, dyspnea  Cards:  negative for chest pain, palpitations, lower extremity edema  :  negative for frequency, dysuria and hematuria  Skin:   negative for rash and pruritus  Heme:   negative for easy bruising and gum/nose bleeding  MS:  negative for myalgias, arthralgias, back pain and muscle weakness  Neurolo:  negative for headaches, dizziness, vertigo, memory problems   Psych:  negative for feelings of anxiety, depression     Pertinent Positives include : weight loss,     Objective:   VITALS:    Visit Vitals    /47    Pulse 85    Temp 98.2 °F (36.8 °C)    Resp 20    Ht 5' 3\" (1.6 m)    Wt 88.3 kg (194 lb 10.7 oz)    SpO2 95%    BMI 34.48 kg/m2     Temp (24hrs), Av.1 °F (36.7 °C), Min:98 °F (36.7 °C), Max:98.2 °F (36.8 °C)    PHYSICAL EXAM:   General:    Alert, cooperative, no distress, appears stated age. Head:   Normocephalic, without obvious abnormality, atraumatic. Eyes:   Conjunctivae clear, anicteric sclerae. Pupils are equal  Nose:  Nares normal. No drainage or sinus tenderness. Throat:    Lips, mucosa, and tongue normal.  No Thrush  Neck:  Supple, symmetrical,  no adenopathy, thyroid: non tender  Back:    Symmetric,  No CVA tenderness. Lungs:   CTA bilaterally. No wheezing/rhonchi/rales. Chest wall:  No tenderness or deformity. No Accessory muscle use. Heart:   Regular rate and rhythm,  no murmur, rub or gallop. Abdomen:   Soft, non-tender. Mildly distended. Bowel sounds normal.  SHERRON:  Hard stool balls in rectum, no palpable mass  Extremities: Atraumatic, No cyanosis. No edema. No clubbing  Skin:     Texture, turgor normal. No rashes/lesions/jaundice  Lymph: Cervical, supraclavicular normal.  Psych:  Good insight. Not depressed. Not anxious or agitated. Neurologic: EOMs intact. No facial asymmetry.  No aphasia or slurred speech. Normal  strength, A/O X 3. LAB DATA REVIEWED:    Recent Results (from the past 48 hour(s))   EKG, 12 LEAD, INITIAL    Collection Time: 10/15/17 11:52 AM   Result Value Ref Range    Ventricular Rate 89 BPM    Atrial Rate 89 BPM    P-R Interval 150 ms    QRS Duration 86 ms    Q-T Interval 358 ms    QTC Calculation (Bezet) 435 ms    Calculated P Axis 43 degrees    Calculated R Axis -13 degrees    Calculated T Axis 24 degrees    Diagnosis       Sinus rhythm with premature supraventricular complexes  Possible Anterior infarct , age undetermined  Abnormal ECG  No previous ECGs available  Confirmed by Aryan Davila ([id number]) on 10/16/2017 12:15:07 PM     CBC WITH AUTOMATED DIFF    Collection Time: 10/15/17 12:57 PM   Result Value Ref Range    WBC 6.0 3.6 - 11.0 K/uL    RBC 3.90 3.80 - 5.20 M/uL    HGB 11.5 11.5 - 16.0 g/dL    HCT 36.2 35.0 - 47.0 %    MCV 92.8 80.0 - 99.0 FL    MCH 29.5 26.0 - 34.0 PG    MCHC 31.8 30.0 - 36.5 g/dL    RDW 14.1 11.5 - 14.5 %    PLATELET 864 169 - 020 K/uL    NEUTROPHILS 63 32 - 75 %    LYMPHOCYTES 26 12 - 49 %    MONOCYTES 6 5 - 13 %    EOSINOPHILS 5 0 - 7 %    BASOPHILS 0 0 - 1 %    ABS. NEUTROPHILS 3.8 1.8 - 8.0 K/UL    ABS. LYMPHOCYTES 1.5 0.8 - 3.5 K/UL    ABS. MONOCYTES 0.4 0.0 - 1.0 K/UL    ABS. EOSINOPHILS 0.3 0.0 - 0.4 K/UL    ABS. BASOPHILS 0.0 0.0 - 0.1 K/UL   METABOLIC PANEL, COMPREHENSIVE    Collection Time: 10/15/17 12:57 PM   Result Value Ref Range    Sodium 138 136 - 145 mmol/L    Potassium 3.8 3.5 - 5.1 mmol/L    Chloride 104 97 - 108 mmol/L    CO2 27 21 - 32 mmol/L    Anion gap 7 5 - 15 mmol/L    Glucose 109 (H) 65 - 100 mg/dL    BUN 20 6 - 20 MG/DL    Creatinine 1.31 (H) 0.55 - 1.02 MG/DL    BUN/Creatinine ratio 15 12 - 20      GFR est AA 46 (L) >60 ml/min/1.73m2    GFR est non-AA 38 (L) >60 ml/min/1.73m2    Calcium 8.5 8.5 - 10.1 MG/DL    Bilirubin, total 0.7 0.2 - 1.0 MG/DL    ALT (SGPT) 18 12 - 78 U/L    AST (SGOT) 18 15 - 37 U/L    Alk.  phosphatase 64 45 - 117 U/L    Protein, total 6.9 6.4 - 8.2 g/dL    Albumin 3.2 (L) 3.5 - 5.0 g/dL    Globulin 3.7 2.0 - 4.0 g/dL    A-G Ratio 0.9 (L) 1.1 - 2.2     CK W/ CKMB & INDEX    Collection Time: 10/15/17 12:57 PM   Result Value Ref Range    CK 45 26 - 192 U/L    CK - MB <1.0 <3.6 NG/ML    CK-MB Index Cannot be calculated 0 - 2.5     NT-PRO BNP    Collection Time: 10/15/17 12:57 PM   Result Value Ref Range    NT pro- (H) 0 - 450 PG/ML   MAGNESIUM    Collection Time: 10/15/17 12:57 PM   Result Value Ref Range    Magnesium 2.0 1.6 - 2.4 mg/dL   TROPONIN I    Collection Time: 10/15/17 12:57 PM   Result Value Ref Range    Troponin-I, Qt. <0.04 <0.05 ng/mL   URINALYSIS W/MICROSCOPIC    Collection Time: 10/15/17  2:01 PM   Result Value Ref Range    Color DARK YELLOW      Appearance CLOUDY (A) CLEAR      Specific gravity 1.026 1.003 - 1.030      pH (UA) 6.0 5.0 - 8.0      Protein TRACE (A) NEG mg/dL    Glucose NEGATIVE  NEG mg/dL    Ketone NEGATIVE  NEG mg/dL    Blood NEGATIVE  NEG      Urobilinogen 1.0 0.2 - 1.0 EU/dL    Nitrites NEGATIVE  NEG      Leukocyte Esterase NEGATIVE  NEG      WBC 0-4 0 - 4 /hpf    RBC 0-5 0 - 5 /hpf    Epithelial cells MODERATE (A) FEW /lpf    Bacteria 1+ (A) NEG /hpf    Mucus 1+ (A) NEG /lpf   BILIRUBIN, CONFIRM    Collection Time: 10/15/17  2:01 PM   Result Value Ref Range    Bilirubin UA, confirm NEGATIVE  NEG     CULTURE, BLOOD, PAIRED    Collection Time: 10/15/17  4:56 PM   Result Value Ref Range    Special Requests: NO SPECIAL REQUESTS      Culture result: NO GROWTH AFTER 12 HOURS     LACTIC ACID    Collection Time: 10/15/17  5:09 PM   Result Value Ref Range    Lactic acid 2.7 (HH) 0.4 - 2.0 MMOL/L   LACTIC ACID    Collection Time: 10/15/17  9:10 PM   Result Value Ref Range    Lactic acid 2.4 (HH) 0.4 - 2.0 MMOL/L   CBC WITH AUTOMATED DIFF    Collection Time: 10/16/17  3:05 AM   Result Value Ref Range    WBC 5.9 3.6 - 11.0 K/uL    RBC 3.38 (L) 3.80 - 5.20 M/uL    HGB 10.2 (L) 11.5 - 16.0 g/dL HCT 31.5 (L) 35.0 - 47.0 %    MCV 93.2 80.0 - 99.0 FL    MCH 30.2 26.0 - 34.0 PG    MCHC 32.4 30.0 - 36.5 g/dL    RDW 14.1 11.5 - 14.5 %    PLATELET 407 281 - 584 K/uL    NEUTROPHILS 60 32 - 75 %    LYMPHOCYTES 28 12 - 49 %    MONOCYTES 8 5 - 13 %    EOSINOPHILS 4 0 - 7 %    BASOPHILS 0 0 - 1 %    ABS. NEUTROPHILS 3.5 1.8 - 8.0 K/UL    ABS. LYMPHOCYTES 1.7 0.8 - 3.5 K/UL    ABS. MONOCYTES 0.5 0.0 - 1.0 K/UL    ABS. EOSINOPHILS 0.2 0.0 - 0.4 K/UL    ABS. BASOPHILS 0.0 0.0 - 0.1 K/UL   METABOLIC PANEL, COMPREHENSIVE    Collection Time: 10/16/17  3:05 AM   Result Value Ref Range    Sodium 141 136 - 145 mmol/L    Potassium 3.9 3.5 - 5.1 mmol/L    Chloride 106 97 - 108 mmol/L    CO2 27 21 - 32 mmol/L    Anion gap 8 5 - 15 mmol/L    Glucose 99 65 - 100 mg/dL    BUN 19 6 - 20 MG/DL    Creatinine 1.20 (H) 0.55 - 1.02 MG/DL    BUN/Creatinine ratio 16 12 - 20      GFR est AA 51 (L) >60 ml/min/1.73m2    GFR est non-AA 42 (L) >60 ml/min/1.73m2    Calcium 8.2 (L) 8.5 - 10.1 MG/DL    Bilirubin, total 0.5 0.2 - 1.0 MG/DL    ALT (SGPT) 16 12 - 78 U/L    AST (SGOT) 16 15 - 37 U/L    Alk. phosphatase 56 45 - 117 U/L    Protein, total 6.1 (L) 6.4 - 8.2 g/dL    Albumin 2.7 (L) 3.5 - 5.0 g/dL    Globulin 3.4 2.0 - 4.0 g/dL    A-G Ratio 0.8 (L) 1.1 - 2.2     LD    Collection Time: 10/16/17  3:05 AM   Result Value Ref Range     81 - 246 U/L   CULTURE, BODY FLUID W GRAM STAIN    Collection Time: 10/16/17 12:30 PM   Result Value Ref Range    Special Requests: NO SPECIAL REQUESTS      GRAM STAIN NO WBC'S SEEN      GRAM STAIN NO ORGANISMS SEEN      Culture result: PENDING    PROTEIN TOTAL, FLUID    Collection Time: 10/16/17 12:30 PM   Result Value Ref Range    Fluid Type: ASCITIC FLUID       Protein total, body fld.  4.1 g/dL   CELL COUNT, BODY FLUID    Collection Time: 10/16/17 12:30 PM   Result Value Ref Range    BODY FLUID TYPE ASCITIC FLUID       FLUID COLOR RED      FLUID APPEARANCE CLOUDY      FLUID RBC CT. >100 /cu mm    FLUID WBC COUNT 729 (H) 0 - 5 /cu mm   ALBUMIN, FLUID    Collection Time: 10/16/17 12:30 PM   Result Value Ref Range    Fluid Type: ASCITIC FLUID       Albumin, body fld. 2.4 g/dL     IMAGING RESULTS:   [x]      I have personally reviewed the actual   []    CXR  [x]    CT  []     US    \"FINDINGS:   LUNG BASES: Small to moderate left pleural effusion. Patchy inferior lingular  and posterior left basilar atelectasis versus pneumonia. INCIDENTALLY IMAGED HEART AND MEDIASTINUM: Atherosclerotic calcifications. Normal cardiac size. No pericardial effusion. LIVER: Nonspecific 5 mm calcifications at apex of caudate lobe and medial  segment of the left hepatic lobe. . No biliary duct dilation. GALLBLADDER: Unremarkable. SPLEEN: No mass. PANCREAS: No mass or ductal dilatation. ADRENALS: Unremarkable. KIDNEYS/URETERS: No mass, calculus, or hydronephrosis. 3 cysts bilaterally, the  largest measuring 4.5 cm in size. STOMACH: Nondistended. SMALL BOWEL: Nondistended. COLON: Nondistended. APPENDIX: Not demonstrated. PERITONEUM: Moderate to large amount of ascites with numerous marginal nodular  opacities. Nodular opacification extends into the adjacent omentum, transverse  mesocolon and possibly small bowel mesentery. RETROPERITONEUM: No retroperitoneal mass or adenopathy demonstrated. Aortoiliac  vascular calcifications though no aneurysm. REPRODUCTIVE ORGANS: Status post hysterectomy. URINARY BLADDER: Poorly assessed on this study due to adjacent ascites. BONES: No lytic or sclerotic osseous mass lesion. Moderate lower lumbar spine  degenerative changes. ADDITIONAL COMMENTS: N/A     IMPRESSION  IMPRESSION:     1. Moderate to large amount of ascites with numerous ascitic nodules as well as  omental and transverse mesocolic caking. 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 2. Small to moderate left pleural effusion. Patchy left basilar atelectasis  versus pneumonia. \"    Assessment/Plan: Active Problems:    Ascites (10/15/2017)    81 yo F with asictes, suspected malignant. No prior CRC screening. GI vs GYN source most likely, and  her sister had colon cancer. Need a tissue diagnosis, and if required, will pursue EGD/Colonoscopy to achieve this.  ___________________________________________________  RECOMMENDATIONS:      -- await results of tumor markers  -- await results of fluid cytology  -- await results of CT chest  -- discussed risks, benefits, alternatives to EGD/Colonoscopy and she's willing to proceed if needed  -- will consult with hematology/oncology and primary team about timing for these, tentatively plan for Wed.     Discussed Code Status:    [x]    Full Code      []    DNR    ___________________________________________________  Care Plan discussed with:    [x]    Patient   [x]    Family   [x]    Nursing   []    Attending  Total Time : 45 minutes   ___________________________________________________  GI: Carlos Mcdaniel MD

## 2017-10-16 NOTE — PROGRESS NOTES
Oncology Nursing Communication Tool      7:54 AM  10/16/2017     Bedside shift change report given to Fiona, RN (incoming nurse) by Carlos Belle (outgoing nurse) on Alok Christiansen a 80 y.o. female who was admitted on 10/15/2017 11:47 AM. Report included the following information SBAR, Kardex, Intake/Output, MAR and Recent Results. Significant changes during shift: none      Issues for physician to address: none          Code Status: Full Code     Infections: No current active infections     Allergies: Valsartan     Current diet: DIET CARDIAC Regular       Pain Controlled [x] yes [] no   Bowel Movement [] yes [x] no   Last Bowel Movement (date) 10/15/17            Vital Signs:   Patient Vitals for the past 12 hrs:   Temp Pulse Resp BP SpO2   10/16/17 0718 98.1 °F (36.7 °C) 78 20 127/55 92 %   10/16/17 0156 98 °F (36.7 °C) 86 20 133/61 95 %   10/16/17 0058 - 85 20 115/64 96 %   10/16/17 0045 - 86 25 - (!) 89 %   10/16/17 0015 - 93 21 109/65 92 %   10/15/17 2200 - 94 25 131/56 96 %   10/15/17 2030 - 91 26 116/54 96 %      Intake & Output:   No intake or output data in the 24 hours ending 10/16/17 0754   Laboratory Results:     Recent Results (from the past 12 hour(s))   LACTIC ACID    Collection Time: 10/15/17  9:10 PM   Result Value Ref Range    Lactic acid 2.4 (HH) 0.4 - 2.0 MMOL/L   CBC WITH AUTOMATED DIFF    Collection Time: 10/16/17  3:05 AM   Result Value Ref Range    WBC 5.9 3.6 - 11.0 K/uL    RBC 3.38 (L) 3.80 - 5.20 M/uL    HGB 10.2 (L) 11.5 - 16.0 g/dL    HCT 31.5 (L) 35.0 - 47.0 %    MCV 93.2 80.0 - 99.0 FL    MCH 30.2 26.0 - 34.0 PG    MCHC 32.4 30.0 - 36.5 g/dL    RDW 14.1 11.5 - 14.5 %    PLATELET 311 410 - 655 K/uL    NEUTROPHILS 60 32 - 75 %    LYMPHOCYTES 28 12 - 49 %    MONOCYTES 8 5 - 13 %    EOSINOPHILS 4 0 - 7 %    BASOPHILS 0 0 - 1 %    ABS. NEUTROPHILS 3.5 1.8 - 8.0 K/UL    ABS. LYMPHOCYTES 1.7 0.8 - 3.5 K/UL    ABS. MONOCYTES 0.5 0.0 - 1.0 K/UL    ABS. EOSINOPHILS 0.2 0.0 - 0.4 K/UL    ABS. BASOPHILS 0.0 0.0 - 0.1 K/UL   METABOLIC PANEL, COMPREHENSIVE    Collection Time: 10/16/17  3:05 AM   Result Value Ref Range    Sodium 141 136 - 145 mmol/L    Potassium 3.9 3.5 - 5.1 mmol/L    Chloride 106 97 - 108 mmol/L    CO2 27 21 - 32 mmol/L    Anion gap 8 5 - 15 mmol/L    Glucose 99 65 - 100 mg/dL    BUN 19 6 - 20 MG/DL    Creatinine 1.20 (H) 0.55 - 1.02 MG/DL    BUN/Creatinine ratio 16 12 - 20      GFR est AA 51 (L) >60 ml/min/1.73m2    GFR est non-AA 42 (L) >60 ml/min/1.73m2    Calcium 8.2 (L) 8.5 - 10.1 MG/DL    Bilirubin, total 0.5 0.2 - 1.0 MG/DL    ALT (SGPT) 16 12 - 78 U/L    AST (SGOT) 16 15 - 37 U/L    Alk. phosphatase 56 45 - 117 U/L    Protein, total 6.1 (L) 6.4 - 8.2 g/dL    Albumin 2.7 (L) 3.5 - 5.0 g/dL    Globulin 3.4 2.0 - 4.0 g/dL    A-G Ratio 0.8 (L) 1.1 - 2.2                Opportunity for questions and clarifications were given to the incoming nurse. Patient's bed is in low position, side rails x2, door open PRN, call bell within reach and patient not in distress.       Bill Presley

## 2017-10-16 NOTE — ED NOTES
Bedside and Verbal shift change report received from Evelyn Traore RN (offgoing nurse) given to Destiney Benito RN (oncoming nurse). Report included the following information SBAR, Kardex, ED Summary, MAR, Recent Results and Med Rec Status. Pt provided extra pillow for comfort. No further needs expressed at this time. Family remains at bedside.

## 2017-10-16 NOTE — CONSULTS
Greatly appreciate consult! See full note dictated. 80 YR. Old otherwise healthy lady admitted with 2-3 weeks of cough, decreased appetite, weakness and new onset of ascites with omental caking/ nodules on admission CT abdomen. 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 Recs:  Agree with diagnostic paracentesis - send for cytology with other tests. Add tumor markers- CEA,  and CA 19-9, LDh   CT chest to assess for malignany in lungs or lymphadenoathy  Even with a positive cytology, will likely need more tests to determine tissue of origin- can consider GI consult   Physical therapy  Add DVT prophylaxis - lovenox 30 mg sc once daily in addition to SCDs    D/w patient and her son. Following closely with you.

## 2017-10-16 NOTE — PROGRESS NOTES
3620 ml of fluid removed during Paracentesis. Patient tolerated procedure without difficulty. Fluid sent to the lab following paracentesis.

## 2017-10-16 NOTE — PROGRESS NOTES
Pt. Admitted from the ER due to SOB, wheezing, and cough x 2-3 wks. She also had generalized abd. Pain and decreased appetite. She did go to an Urgent Care and was given a fluid pill Rx.  and sent home. Her CT showed a lge. Amt. Of ascites and pleural effusion. She has been diagnosed with Carcinamatosis and LLL Pneumonia. She was started on IV antbx. , blood cx. Were done, and work up for malignancy per Oncology. She is for a diagnostic paracentesis today. Pt. Has hx. Of HTN, CKD, GERD, and Autoimmine Dz.         Pt. Lives alone and uses a RW. She has 2 sons listed as emergency contacts. CM will follow and interview pt. Later. . She has gone for her paracentesis. Alex CONRAD,JULIAN,DGW--[phone]    Care Management Interventions  PCP Verified by CM:  Yes  Transition of Care Consult (CM Consult): Discharge Planning  MyChart Signup: No  Discharge Durable Medical Equipment:  (has RW)  Health Maintenance Reviewed: Yes  Physical Therapy Consult: No  Occupational Therapy Consult: No  Speech Therapy Consult: No  Current Support Network: Own Home, Lives Alone  Confirm Follow Up Transport: Family

## 2017-10-16 NOTE — CONSULTS
[id number]    [facility]       Name:  Tegan Holt   MR#:  [id number]   :  1927   Account #:  [de-identified]    Date of Consultation:  10/16/2017   Date of Adm:  10/15/2017       REASON FOR CONSULTATION: Kindly referred by Dr. Marixa Fountain for   abdominal carcinomatosis. HISTORY OF PRESENT ILLNESS: The patient is a very pleasant 80  year-old  woman who has been very healthy most of her life,   represented to [facility], who has been   having 3 weeks of decreased appetite, dry cough, shortness of breath,   generalized weakness, was seen at an urgent care center about 3   days ago, where she had an x-ray, received some Lasix and was sent   home, presented to the emergency room yesterday for another   opinion. Her CT abdomen done revealed that she had moderate to   large amount of new onset ascites with numerous peritoneal nodules in   the omentum and transverse mesocolon and caking consistent with or   highly suspicious for abdominal carcinomatosis. She also had a small   moderate left pleural effusion and some left basilar atelectasis versus   pneumonia. We have been asked to consult regarding this problem. The patient was seen, a little bit better but quite tired and states that for   the last week she has noticed persistent abdominal bloating or   swelling. She also noticed pencil thin stools for the last several days. Denies any melena or hematochezia. Her shortness of breath is stable   now for the last 2-3 days. Overall, she feels tired. She denies having   any lumps, swelling, breast nodules, dysphagia, heartburn, any other   localizing symptoms. PAST MEDICAL HISTORY:   1. GERD. 2. Hypertension. 3. Chronic kidney disease. 4. Some type of altering disease that she seems to be unaware of. PAST SURGICAL HISTORY:   1. Partial hysterectomy, her ovaries were left intact.    2. Appendectomy. 3. Parathyroidectomy. SOCIAL HISTORY: Never a smoker, never used smokeless tobacco.   No history of alcohol use. No history of illicit IV drug use in the past.   She lives at home by herself, was quite independent up until this   admission and her son lives close by. ALLERGIES: VALSARTAN, CAUSE MALAISE. CURRENT MEDICATIONS: In [facility] have been reviewed   including her medications at home which were:   1. Aspirin 81 mg p.o. once daily. 2. Amlodipine 2.5 mg p.o. once daily. 3. Protonix 40 mg p.o. once daily. 4. Potassium 10 mEq p.o. once daily. 5. Vytorin 10-10 mg p.o. once daily. 6. Magnesium oxide 400 mg 1 p.o. once daily. 7. Fish oil 1 capsule by mouth. REVIEW OF SYSTEMS: Positive for fever, abdominal bloating,   dyspnea on exertion, dry cough, sweating around her ankles, loss of   appetite. PHYSICAL EXAMINATION   VITAL SIGNS: She is afebrile. T-max is 98.5, pulse is 78 per minute,   respirations are 18-20 per minute, and blood pressure is 127/55,   oxygen saturations are 92%. She has been intermittently on 2 liters of   nasal oxygen. HEENT: Oral mucosa was moist.   GENERAL: Elderly lady, not in acute distress, lying comfortably in bed. NECK: Supple. LYMPHATIC: Showed no evidence of lymphadenopathy in the   cervical, supraclavicular, axillary, or inguinal areas. LUNGS: Decreased breath sounds at both bases, more so on the left   than the right. ABDOMEN: Distended, doughy abdomen, feeling of nodularity along   the entire abdominal wall/and peritoneum. She has some amount of   shifting dullness as well. Bowel sounds are present but reduced. Cannot palpate any obvious organomegaly. EXTREMITIES: 1+ edema around both ankles. SCDs were in place. NEUROLOGIC: No focal neurologic deficits. LABORATORY DATA: WBC count 5.9, hemoglobin 10.2, hematocrit   31.5, platelets 912. ANC is 3.5.  Urinalysis showed moderate amount of   epithelial cells, 1+ bacteria, and mucus, and I think a culture is   pending. CMP today revealed albumin to be low at 2.7. Sodium is 141,   potassium 3.9, BUN 19, creatinine 1.2. Total bilirubin is 0.5. Blood   cultures pending. IMAGING DATA: Chest x-ray 10/15/2017, no acute finding. CT scan:   CT of abdomen and pelvis done without contrast on 10/15/2017   showing moderate to large amount of ascites with numerous nodules   as well as omental and transverse mesocolic caking consistent with a   primary or metastatic neoplastic process, small to moderate left pleural   effusion, patchy left basilar atelectasis versus pneumonia. IMPRESSION AND PLAN: A 80-year-old  woman with   history of hypertension, mild chronic kidney disease, but otherwise   healthy nonsmoker, presents with 2-3 week history of decreased   appetite, cough, shortness of breath, abdominal distention, pencil-thin   stools with radiologic picture on CT abdomen, as described above, is   highly suspicious for an abdominal carcinomatosis-type process with   new onset of ascites. The differential diagnosis here is that this could be a process that   originated from a gastrointestinal malignancy either upper   gastrointestinal or lower or even a pancreaticobiliary malignancy or   a gynecologic malignancy. 1. I agree with a diagnostic paracentesis in addition to the other tests   were sent for flow cytology. 2. Add tumor markers, CEA, CA-125, CA 19-9, and LDH level. 3. CT chest to look for evidence of malignancy elsewhere within the   lungs with lymph nodes of the chest.   4. Given the positive cytology, we may need further testing to   determine tissue of origin. We can consider getting a gastroenterology   consult to assist with that as well. She has been fairly healthy up until   now and independent. We will get physical therapy on board as well to   prevent further deconditioning.  She is on antibiotics, ceftriaxone and   azithromycin, for presumed community-acquired pneumonia. Will give   her DVT prophylaxis. Will add DVT prophylaxis with enoxaparin at   renal doses at 30 mg subcu once daily. We will follow closely with you. I have discussed my findings with the patient and her son as well. Thank you so much for the consultation. BETHEL NAQVI [facility] Medical CenterMD [place] / Kae Gonzalez   D:  10/16/2017   12:09   T:  10/16/2017   13:16   Job #:  [id number]

## 2017-10-16 NOTE — ED NOTES
TRANSFER - OUT REPORT:    Verbal report given to JOJO Orozco(name) on Veto Clayton  being transferred to Oncology(unit) for routine progression of care       Report consisted of patients Situation, Background, Assessment and   Recommendations(SBAR). Information from the following report(s) SBAR, Kardex, ED Summary, MAR, Recent Results and Med Rec Status was reviewed with the receiving nurse. Lines:   Peripheral IV 10/15/17 Right Antecubital (Active)   Site Assessment Clean, dry, & intact 10/15/2017 12:56 PM   Phlebitis Assessment 0 10/15/2017 12:56 PM   Infiltration Assessment 0 10/15/2017 12:56 PM   Dressing Status Clean, dry, & intact 10/15/2017 12:56 PM   Dressing Type Transparent 10/15/2017 12:56 PM   Hub Color/Line Status Pink;Flushed;Patent 10/15/2017 12:56 PM   Action Taken Catheter retaped;Blood drawn 10/15/2017 12:56 PM        Opportunity for questions and clarification was provided.       Patient transported with:   Tech   O2 @ 2L

## 2017-10-16 NOTE — PROGRESS NOTES
Hospitalist Progress Note    NAME: Patric Baeza   :  1927   MRN:  [id number]       Assessment / Plan:  CAP with left pleural effusion: c/w CTX/Z-max, bronchodilators, mucolytics, check sputum. Check ECHO, start diuretics. Intra abdominal carcinomatosis with malignant ascites   -CT abdomen Moderate to large amount of ascites with numerous ascitic nodules as well as omental and transverse mesocolic caking. Fine is consistent with primary or metastatic neoplastic disease. S/p Paracentesis with drainage of 3.6L, start lasix, F/U tumor markers, Oncology help appreciated, get GI evaluation. HTN continue norvasc, use hydralazine prn. HLD hold vytorin  GERD continue PPI  Surrogate Decision Maker:  son  Baseline: . Lives alone. Uses walker. Body mass index is 34.48 kg/(m^2). Code status: Full  Prophylaxis: SCD's  Recommended Disposition:  PT, OT, RN     Subjective:     Chief Complaint / Reason for Physician Visit  \"I feel much better\". Discussed with RN events overnight. Review of Systems:  Symptom Y/N Comments  Symptom Y/N Comments   Fever/Chills    Chest Pain     Poor Appetite    Edema     Cough    Abdominal Pain y    Sputum    Joint Pain     SOB/KING y   Pruritis/Rash     Nausea/vomit    Tolerating PT/OT     Diarrhea    Tolerating Diet y    Constipation    Other       Could NOT obtain due to:      Objective:     VITALS:   Last 24hrs VS reviewed since prior progress note.  Most recent are:  Patient Vitals for the past 24 hrs:   Temp Pulse Resp BP SpO2   10/16/17 1427 98.2 °F (36.8 °C) 85 20 137/47 95 %   10/16/17 1250 - 73 20 117/50 91 %   10/16/17 1240 - 76 20 126/49 91 %   10/16/17 1230 - 77 20 116/49 91 %   10/16/17 1220 - 78 20 127/54 91 %   10/16/17 1210 - 78 20 132/63 91 %   10/16/17 1205 - 78 20 124/51 90 %   10/16/17 0718 98.1 °F (36.7 °C) 78 20 127/55 92 %   10/16/17 0156 98 °F (36.7 °C) 86 20 133/61 95 %   10/16/17 0058 - 85 20 115/64 96 %   10/16/17 0045 - 86 25 - (!) 89 % 10/16/17 0015 - 93 21 109/65 92 %   10/15/17 2200 - 94 25 131/56 96 %   10/15/17 2030 - 91 26 116/54 96 %   10/15/17 1945 - 94 23 120/52 98 %   10/15/17 1900 - 97 27 132/61 92 %   10/15/17 1815 - 94 22 141/56 95 %   10/15/17 1730 - 92 21 117/48 97 %   10/15/17 1645 - (!) 101 16 154/58 98 %   10/15/17 1600 - 95 19 137/55 99 %   10/15/17 1555 - 98 18 156/65 98 %     No intake or output data in the 24 hours ending 10/16/17 1538     PHYSICAL EXAM:  General: WD, WN. Alert, cooperative, no acute distress    EENT:  EOMI. Anicteric sclerae. MMM  Resp:  Coarse BS  CV:  Regular  rhythm,  No edema  GI:  Soft, Non distended, Non tender.  +Bowel sounds  Neurologic:  Alert and oriented X 3, normal speech,   Psych:   Good insight. Not anxious nor agitated  Skin:  No rashes. No jaundice    Reviewed most current lab test results and cultures  YES  Reviewed most current radiology test results   YES  Review and summation of old records today    NO  Reviewed patient's current orders and MAR    YES  PMH/SH reviewed - no change compared to H&P  ________________________________________________________________________  Care Plan discussed with:    Comments   Patient y    Family  y    RN y    Care Manager     Consultant                        Multidiciplinary team rounds were held today with , nursing, pharmacist and clinical coordinator. Patient's plan of care was discussed; medications were reviewed and discharge planning was addressed.      ________________________________________________________________________  Total NON critical care TIME: 35   Minutes    Total CRITICAL CARE TIME Spent:   Minutes non procedure based      Comments   >50% of visit spent in counseling and coordination of care y    ________________________________________________________________________  Dandre Sierra MD     Procedures: see electronic medical records for all procedures/Xrays and details which were not copied into this note but were reviewed prior to creation of Plan. LABS:  I reviewed today's most current labs and imaging studies.   Pertinent labs include:  Recent Labs      10/16/17   0305  10/15/17   1257   WBC  5.9  6.0   HGB  10.2*  11.5   HCT  31.5*  36.2   PLT  355  370     Recent Labs      10/16/17   0305  10/15/17   1257   NA  141  138   K  3.9  3.8   CL  106  104   CO2  27  27   GLU  99  109*   BUN  19  20   CREA  1.20*  1.31*   CA  8.2*  8.5   MG   --   2.0   ALB  2.7*  3.2*   TBILI  0.5  0.7   SGOT  16  18   ALT  16  18       Signed: Agnieszka Giraldo MD

## 2017-10-16 NOTE — PROGRESS NOTES
TRANSFER - IN REPORT:    Verbal report received from Michael(name) on Daquan Cortes  being received from ED(unit) for routine progression of care      Report consisted of patients Situation, Background, Assessment and   Recommendations(SBAR). Information from the following report(s) SBAR, Kardex, ED Summary, Intake/Output, MAR and Recent Results was reviewed with the receiving nurse. Opportunity for questions and clarification was provided. Assessment completed upon patients arrival to unit and care assumed.        Primary Nurse Carey Yan and Xiang RN performed a dual skin assessment on this patient No impairment noted  Clyde score is 18

## 2017-10-16 NOTE — H&P
Hospitalist Admission Note    NAME: Jaxon Negro   :  1927   MRN:  [id number]     Date/Time:  10/15/2017 9:17 PM    Patient PCP: Princess Swift MD  ________________________________________________________________________    My assessment of this patient's clinical condition and my plan of care is as follows. Assessment / Plan:    CAP with left pleural effusion  -continue Rocephin / zithromx. Follow up on cultures  -oxygen prn  -check Echocardiogram    Intra abdominal carcinomatosis with malignant ascites   -CT abdomen Moderate to large amount of ascites with numerous ascitic nodules as well as omental and transverse mesocolic caking. Fine is consistent with primary or metastatic neoplastic disease.  -hold lasix  -US guided diagnostic paracentesis in AM.  Send for cellcount, culture and cytology  -consult Oncology in AM    HTN  -continue norvasc    HLD  -hold vytorin    GERD  -continue PPI      Code Status:  Full  Surrogate Decision Maker:  son    DVT Prophylaxis:  SCD. Avoiding lovenox for paracentesis  GI Prophylaxis: not indicated    Baseline: . Lives alone. Uses walker. Subjective:   CHIEF COMPLAINT:  weakness    HISTORY OF PRESENT ILLNESS:     Lea Ortiz is a 80 y.o.  female with hx HTN, HLD, and GERD who presents with 3 weeks of decreased appetite, dry cough, wheezing, SOB and generalized weakness that have been progressively worsening. Patient was seen at an urgent care center 3 days ago, where she had an xray, and she was sent home on Lasix without significant improvement in her symptoms. They presented this time to this ER for another opinion. CT abdomen demonstrates  Moderate to large amount of ascites with numerous ascitic nodules as well as omental and transverse mesocolic caking. Fine is consistent with primary or metastatic neoplastic disease.  Small to moderate left pleural effusion.  Patchy left basilar atelectasis versus pneumonia. We were asked to admit for work up and evaluation of the above problems. Past Medical History:   Diagnosis Date    Autoimmune disease ([address]     Chronic kidney disease     GERD (gastroesophageal reflux disease)     Hypertension     Murmur         Past Surgical History:   Procedure Laterality Date    HX HYSTERECTOMY      HX OTHER SURGICAL      Parathyroidectomy        Social History   Substance Use Topics    Smoking status: Never Smoker    Smokeless tobacco: Never Used    Alcohol use No        FHx, no early CAD  Allergies   Allergen Reactions    Valsartan Other (comments)     malaise        Prior to Admission medications    Medication Sig Start Date End Date Taking? Authorizing Provider   FUROSEMIDE PO Take  by mouth. Yes Phys Other, MD   aspirin 81 mg chewable tablet Take 81 mg by mouth daily. Yes Historical Provider   amLODIPine (NORVASC) 2.5 mg tablet Take 2.5 mg by mouth daily. Yes Historical Provider   pantoprazole (PROTONIX) 40 mg tablet Take 40 mg by mouth daily. Yes Historical Provider   potassium chloride SR (KLOR-CON 10) 10 mEq tablet Take 10 mEq by mouth daily. Yes Historical Provider   ezetimibe-simvastatin (VYTORIN 10/10) 10-10 mg per tablet Take 1 Tab by mouth nightly. Yes Historical Provider   magnesium oxide (MAG-OX) 400 mg tablet Take 400 mg by mouth daily. Yes Historical Provider   omega-3 fatty acids-vitamin e (FISH OIL) 1,000 mg cap Take 1 Cap by mouth daily.    Yes Historical Provider       REVIEW OF SYSTEMS:       Total of 12 systems reviewed as follows:       POSITIVE= underlined text  Negative = text not underlined  General:  fever, chills, sweats, generalized weakness, weight gain,      loss of appetite   Eyes:    blurred vision, eye pain, loss of vision, double vision  ENT:    rhinorrhea, pharyngitis   Respiratory:   cough, sputum production, SOB, KING, wheezing, pleuritic pain   Cardiology:   chest pain, palpitations, orthopnea, PND, edema, syncope Gastrointestinal:  abdominal pain , N/V, diarrhea, dysphagia, constipation, bleeding   Genitourinary:  frequency, urgency, dysuria, hematuria, incontinence   Muskuloskeletal :  arthralgia, myalgia, back pain  Hematology:  easy bruising, nose or gum bleeding, lymphadenopathy   Dermatological: rash, ulceration, pruritis, color change / jaundice  Endocrine:   hot flashes or polydipsia   Neurological:  headache, dizziness, confusion, focal weakness, paresthesia,     Speech difficulties, memory loss, gait difficulty  Psychological: Feelings of anxiety, depression, agitation    Objective:   VITALS:    Visit Vitals    /54    Pulse 91    Temp 98.5 °F (36.9 °C)    Resp 26    Ht 5' 3\" (1.6 m)    Wt 88.3 kg (194 lb 10.7 oz)    SpO2 96%    BMI 34.48 kg/m2       PHYSICAL EXAM:    General:    Alert, cooperative, no distress, appears stated age. HEENT: Atraumatic, anicteric sclerae, pink conjunctivae     No oral ulcers, mucosa moist, throat clear, dentition fair  Neck:  Supple, symmetrical,  thyroid: non tender  Lungs:   Clear to auscultation bilaterally. No Wheezing or Rhonchi. No rales. Chest wall:  No tenderness  No Accessory muscle use. Heart:   Regular  rhythm,  +sys  murmur   + pitting edema  Abdomen:   Soft, non-tender. Slightly distended. Bowel sounds normal  Extremities: No cyanosis. No clubbing, Skin turgor normal, Capillary refill normal, Radial dial pulse 2+  Skin:     Not pale. Not Jaundiced  No rashes   Psych:  Good insight. Not depressed. Not anxious or agitated. Neurologic: EOMs intact. No facial asymmetry. No aphasia or slurred speech. Symmetrical strength, Sensation grossly intact.  Alert and oriented X 4.     _______________________________________________________________________  Care Plan discussed with:    Comments   Patient x    Family  x  son   RN     Care Manager                    Consultant:      _______________________________________________________________________  Expected Disposition:   Home with Family    HH/PT/OT/RN x   SNF/LTC x   [facility]BERT    ________________________________________________________________________  TOTAL TIME:  48  Minutes    Critical Care Provided     Minutes non procedure based      Comments    x Reviewed previous records   >50% of visit spent in counseling and coordination of care  Discussion with patient and/or family and questions answered       Given the patient's current clinical presentation, I have a high level of concern for decompensation if discharged from the ED. Complex decision making was performed which includes reviewing the patient's available past medical records, laboratory results, and Xray films. I have also directly communicated my plan and discussed this case with the involved ED physician.     ____________________________________________________________________  Ruy Romero MD    Procedures: see electronic medical records for all procedures/Xrays and details which were not copied into this note but were reviewed prior to creation of Plan.     LAB DATA REVIEWED:    Recent Results (from the past 24 hour(s))   EKG, 12 LEAD, INITIAL    Collection Time: 10/15/17 11:52 AM   Result Value Ref Range    Ventricular Rate 89 BPM    Atrial Rate 89 BPM    P-R Interval 150 ms    QRS Duration 86 ms    Q-T Interval 358 ms    QTC Calculation (Bezet) 435 ms    Calculated P Axis 43 degrees    Calculated R Axis -13 degrees    Calculated T Axis 24 degrees    Diagnosis       Sinus rhythm with premature supraventricular complexes  Possible Anterior infarct , age undetermined  Abnormal ECG  No previous ECGs available     CBC WITH AUTOMATED DIFF    Collection Time: 10/15/17 12:57 PM   Result Value Ref Range    WBC 6.0 3.6 - 11.0 K/uL    RBC 3.90 3.80 - 5.20 M/uL    HGB 11.5 11.5 - 16.0 g/dL    HCT 36.2 35.0 - 47.0 %    MCV 92.8 80.0 - 99.0 FL    MCH 29.5 26.0 - 34.0 PG    MCHC 31.8 30.0 - 36.5 g/dL    RDW 14.1 11.5 - 14.5 %    PLATELET 787 419 - 545 K/uL NEUTROPHILS 63 32 - 75 %    LYMPHOCYTES 26 12 - 49 %    MONOCYTES 6 5 - 13 %    EOSINOPHILS 5 0 - 7 %    BASOPHILS 0 0 - 1 %    ABS. NEUTROPHILS 3.8 1.8 - 8.0 K/UL    ABS. LYMPHOCYTES 1.5 0.8 - 3.5 K/UL    ABS. MONOCYTES 0.4 0.0 - 1.0 K/UL    ABS. EOSINOPHILS 0.3 0.0 - 0.4 K/UL    ABS. BASOPHILS 0.0 0.0 - 0.1 K/UL   METABOLIC PANEL, COMPREHENSIVE    Collection Time: 10/15/17 12:57 PM   Result Value Ref Range    Sodium 138 136 - 145 mmol/L    Potassium 3.8 3.5 - 5.1 mmol/L    Chloride 104 97 - 108 mmol/L    CO2 27 21 - 32 mmol/L    Anion gap 7 5 - 15 mmol/L    Glucose 109 (H) 65 - 100 mg/dL    BUN 20 6 - 20 MG/DL    Creatinine 1.31 (H) 0.55 - 1.02 MG/DL    BUN/Creatinine ratio 15 12 - 20      GFR est AA 46 (L) >60 ml/min/1.73m2    GFR est non-AA 38 (L) >60 ml/min/1.73m2    Calcium 8.5 8.5 - 10.1 MG/DL    Bilirubin, total 0.7 0.2 - 1.0 MG/DL    ALT (SGPT) 18 12 - 78 U/L    AST (SGOT) 18 15 - 37 U/L    Alk.  phosphatase 64 45 - 117 U/L    Protein, total 6.9 6.4 - 8.2 g/dL    Albumin 3.2 (L) 3.5 - 5.0 g/dL    Globulin 3.7 2.0 - 4.0 g/dL    A-G Ratio 0.9 (L) 1.1 - 2.2     CK W/ CKMB & INDEX    Collection Time: 10/15/17 12:57 PM   Result Value Ref Range    CK 45 26 - 192 U/L    CK - MB <1.0 <3.6 NG/ML    CK-MB Index Cannot be calculated 0 - 2.5     NT-PRO BNP    Collection Time: 10/15/17 12:57 PM   Result Value Ref Range    NT pro- (H) 0 - 450 PG/ML   MAGNESIUM    Collection Time: 10/15/17 12:57 PM   Result Value Ref Range    Magnesium 2.0 1.6 - 2.4 mg/dL   TROPONIN I    Collection Time: 10/15/17 12:57 PM   Result Value Ref Range    Troponin-I, Qt. <0.04 <0.05 ng/mL   URINALYSIS W/MICROSCOPIC    Collection Time: 10/15/17  2:01 PM   Result Value Ref Range    Color DARK YELLOW      Appearance CLOUDY (A) CLEAR      Specific gravity 1.026 1.003 - 1.030      pH (UA) 6.0 5.0 - 8.0      Protein TRACE (A) NEG mg/dL    Glucose NEGATIVE  NEG mg/dL    Ketone NEGATIVE  NEG mg/dL    Blood NEGATIVE  NEG      Urobilinogen 1.0 0.2 - 1.0 EU/dL    Nitrites NEGATIVE  NEG      Leukocyte Esterase NEGATIVE  NEG      WBC 0-4 0 - 4 /hpf    RBC 0-5 0 - 5 /hpf    Epithelial cells MODERATE (A) FEW /lpf    Bacteria 1+ (A) NEG /hpf    Mucus 1+ (A) NEG /lpf   BILIRUBIN, CONFIRM    Collection Time: 10/15/17  2:01 PM   Result Value Ref Range    Bilirubin UA, confirm NEGATIVE  NEG     LACTIC ACID    Collection Time: 10/15/17  5:09 PM   Result Value Ref Range    Lactic acid 2.7 (HH) 0.4 - 2.0 MMOL/L

## 2017-10-16 NOTE — PROGRESS NOTES
TRANSFER - OUT REPORT:    Verbal report given to Gregorio Jeffries Rn (name) on Kip Quiros  being transferred to 1119(unit) for routine progression of care       Report consisted of patients Situation, Background, Assessment and   Recommendations(SBAR). Information from the following report(s) Procedure Summary was reviewed with the receiving nurse. Lines:   Peripheral IV 10/15/17 Right Antecubital (Active)   Site Assessment Clean, dry, & intact 10/16/2017  7:18 AM   Phlebitis Assessment 0 10/16/2017  7:18 AM   Infiltration Assessment 0 10/16/2017  7:18 AM   Dressing Status Clean, dry, & intact 10/16/2017  7:18 AM   Dressing Type Transparent;Tape 10/16/2017  7:18 AM   Hub Color/Line Status Pink 10/16/2017  7:18 AM   Action Taken Other (comment) 10/16/2017  7:18 AM        Opportunity for questions and clarification was provided.

## 2017-10-17 ENCOUNTER — APPOINTMENT (OUTPATIENT)
Dept: CT IMAGING | Age: 82
DRG: 375 | End: 2017-10-17
Attending: INTERNAL MEDICINE
Payer: MEDICARE

## 2017-10-17 LAB
ALBUMIN SERPL-MCNC: 2.4 G/DL (ref 3.5–5)
ALBUMIN/GLOB SERPL: 0.7 {RATIO} (ref 1.1–2.2)
ALP SERPL-CCNC: 54 U/L (ref 45–117)
ALT SERPL-CCNC: 15 U/L (ref 12–78)
ANION GAP SERPL CALC-SCNC: 8 MMOL/L (ref 5–15)
AST SERPL-CCNC: 16 U/L (ref 15–37)
BASOPHILS # BLD: 0 K/UL (ref 0–0.1)
BASOPHILS NFR BLD: 0 % (ref 0–1)
BILIRUB SERPL-MCNC: 0.4 MG/DL (ref 0.2–1)
BUN SERPL-MCNC: 17 MG/DL (ref 6–20)
BUN/CREAT SERPL: 14 (ref 12–20)
CALCIUM SERPL-MCNC: 8.3 MG/DL (ref 8.5–10.1)
CHLORIDE SERPL-SCNC: 106 MMOL/L (ref 97–108)
CO2 SERPL-SCNC: 28 MMOL/L (ref 21–32)
CREAT SERPL-MCNC: 1.18 MG/DL (ref 0.55–1.02)
EOSINOPHIL # BLD: 0.4 K/UL (ref 0–0.4)
EOSINOPHIL NFR BLD: 7 % (ref 0–7)
ERYTHROCYTE [DISTWIDTH] IN BLOOD BY AUTOMATED COUNT: 14.1 % (ref 11.5–14.5)
GLOBULIN SER CALC-MCNC: 3.4 G/DL (ref 2–4)
GLUCOSE SERPL-MCNC: 87 MG/DL (ref 65–100)
HCT VFR BLD AUTO: 32 % (ref 35–47)
HGB BLD-MCNC: 10.1 G/DL (ref 11.5–16)
LACTATE SERPL-SCNC: 1 MMOL/L (ref 0.4–2)
LYMPHOCYTES # BLD: 1.6 K/UL (ref 0.8–3.5)
LYMPHOCYTES NFR BLD: 29 % (ref 12–49)
MAGNESIUM SERPL-MCNC: 1.9 MG/DL (ref 1.6–2.4)
MCH RBC QN AUTO: 29.4 PG (ref 26–34)
MCHC RBC AUTO-ENTMCNC: 31.6 G/DL (ref 30–36.5)
MCV RBC AUTO: 93.3 FL (ref 80–99)
MONOCYTES # BLD: 0.4 K/UL (ref 0–1)
MONOCYTES NFR BLD: 7 % (ref 5–13)
NEUTS SEG # BLD: 3.1 K/UL (ref 1.8–8)
NEUTS SEG NFR BLD: 57 % (ref 32–75)
PLATELET # BLD AUTO: 352 K/UL (ref 150–400)
POTASSIUM SERPL-SCNC: 3.8 MMOL/L (ref 3.5–5.1)
PROT SERPL-MCNC: 5.8 G/DL (ref 6.4–8.2)
RBC # BLD AUTO: 3.43 M/UL (ref 3.8–5.2)
SODIUM SERPL-SCNC: 142 MMOL/L (ref 136–145)
WBC # BLD AUTO: 5.5 K/UL (ref 3.6–11)

## 2017-10-17 PROCEDURE — 36415 COLL VENOUS BLD VENIPUNCTURE: CPT | Performed by: INTERNAL MEDICINE

## 2017-10-17 PROCEDURE — 85025 COMPLETE CBC W/AUTO DIFF WBC: CPT | Performed by: INTERNAL MEDICINE

## 2017-10-17 PROCEDURE — 74011250636 HC RX REV CODE- 250/636: Performed by: INTERNAL MEDICINE

## 2017-10-17 PROCEDURE — 71250 CT THORAX DX C-: CPT

## 2017-10-17 PROCEDURE — 80053 COMPREHEN METABOLIC PANEL: CPT | Performed by: INTERNAL MEDICINE

## 2017-10-17 PROCEDURE — 74011250637 HC RX REV CODE- 250/637: Performed by: INTERNAL MEDICINE

## 2017-10-17 PROCEDURE — 83605 ASSAY OF LACTIC ACID: CPT | Performed by: INTERNAL MEDICINE

## 2017-10-17 PROCEDURE — 65270000015 HC RM PRIVATE ONCOLOGY

## 2017-10-17 PROCEDURE — 83735 ASSAY OF MAGNESIUM: CPT | Performed by: INTERNAL MEDICINE

## 2017-10-17 PROCEDURE — 74011000258 HC RX REV CODE- 258: Performed by: INTERNAL MEDICINE

## 2017-10-17 RX ORDER — POLYETHYLENE GLYCOL 3350 17 G/17G
17 POWDER, FOR SOLUTION ORAL DAILY
Status: DISCONTINUED | OUTPATIENT
Start: 2017-10-17 | End: 2017-10-20 | Stop reason: HOSPADM

## 2017-10-17 RX ORDER — BISACODYL 5 MG
10 TABLET, DELAYED RELEASE (ENTERIC COATED) ORAL
Status: COMPLETED | OUTPATIENT
Start: 2017-10-17 | End: 2017-10-17

## 2017-10-17 RX ADMIN — FUROSEMIDE 40 MG: 40 TABLET ORAL at 08:19

## 2017-10-17 RX ADMIN — GUAIFENESIN 400 MG: 200 SOLUTION ORAL at 15:50

## 2017-10-17 RX ADMIN — CEFTRIAXONE 1 G: 1 INJECTION, POWDER, FOR SOLUTION INTRAMUSCULAR; INTRAVENOUS at 16:15

## 2017-10-17 RX ADMIN — AMLODIPINE BESYLATE 2.5 MG: 2.5 TABLET ORAL at 08:20

## 2017-10-17 RX ADMIN — Medication 10 ML: at 16:28

## 2017-10-17 RX ADMIN — PANTOPRAZOLE SODIUM 40 MG: 40 TABLET, DELAYED RELEASE ORAL at 08:19

## 2017-10-17 RX ADMIN — POLYETHYLENE GLYCOL 3350 17 G: 17 POWDER, FOR SOLUTION ORAL at 08:29

## 2017-10-17 RX ADMIN — GUAIFENESIN 400 MG: 200 SOLUTION ORAL at 08:25

## 2017-10-17 RX ADMIN — AZITHROMYCIN 250 MG: 250 TABLET, FILM COATED ORAL at 08:19

## 2017-10-17 RX ADMIN — BISACODYL 10 MG: 5 TABLET, COATED ORAL at 08:19

## 2017-10-17 NOTE — PROGRESS NOTES
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 Fine is consistent with primary or metastatic neoplastic disease. S/p Paracentesis with drainage of 3.6L  Cont lasix  F/U tumor markers,   Oncology following - may have palliative chemo options if patients goes for it, await pending tumor markers for now  GI consult noted- may get EGD/colonoscopy depending on the results of the Tumor markers & pt's wishes- likely on wednesday    HTN continue norvasc, use hydralazine prn. HLD hold vytorin  GERD continue PPI    Surrogate Decision Maker:  Oldest son  Baseline:   . Jacob Gruber alone.  Uses walker.          Code status: DNR as per discussion with me today by patient in front of the son at bedside  Prophylaxis: SCD's  Recommended Disposition: Home w/Family when cancer workup is completed by onco/GI     Subjective:     Chief Complaint / Reason for Physician Visit: F/U Ascites, Abdominal pain, SOB  \"I feel much better with fluid out\". Discussed with RN events overnight. Review of Systems:  Symptom Y/N Comments  Symptom Y/N Comments   Fever/Chills n   Chest Pain n    Poor Appetite n   Edema n    Cough n   Abdominal Pain n resolved   Sputum n   Joint Pain     SOB/KING n improved  Pruritis/Rash     Nausea/vomit n   Tolerating PT/OT y    Diarrhea    Tolerating Diet y    Constipation    Other       Could NOT obtain due to:      Objective:     VITALS:   Last 24hrs VS reviewed since prior progress note.  Most recent are:  Patient Vitals for the past 24 hrs:   Temp Pulse Resp BP SpO2   10/17/17 0819 - 92 - 125/61 -   10/17/17 0711 98.2 °F (36.8 °C) 88 14 115/61 92 %   10/16/17 2309 98.8 °F (37.1 °C) 85 18 (!) 115/93 97 %   10/16/17 1940 98.8 °F (37.1 °C) 88 20 121/53 94 %   10/16/17 1427 98.2 °F (36.8 °C) 85 20 137/47 95 %       Intake/Output Summary (Last 24 hours) at 10/17/17 1423  Last data filed at 10/17/17 0037   Gross per 24 hour   Intake                0 ml   Output              200 ml   Net             -200 ml        PHYSICAL EXAM:  General: WD, WN. Alert, cooperative, no acute distress    EENT:  EOMI. Anicteric sclerae. MMM  Resp:  CTA bilaterally, no wheezing or rales. No accessory muscle use  CV:  Regular  rhythm,  No edema  GI:  Soft, Non distended, Non tender.  +Bowel sounds  Neurologic:  Alert and oriented X 3, normal speech,   Psych:   Good insight. Not anxious nor agitated  Skin:  No rashes. No jaundice    Reviewed most current lab test results and cultures  YES  Reviewed most current radiology test results   YES  Review and summation of old records today    NO  Reviewed patient's current orders and MAR    YES  PMH/SH reviewed - no change compared to H&P  ________________________________________________________________________  Care Plan discussed with:    Comments   Patient x    Family  x Son at bedside   RN x    Care Manager x Teri Abel   Consultant  x Dr Carlos Jimenez (Oncology)                     Multidiciplinary team rounds were held today with , nursing, pharmacist and clinical coordinator. Patient's plan of care was discussed; medications were reviewed and discharge planning was addressed.      ________________________________________________________________________  Total NON critical care TIME:  36   Minutes    Total CRITICAL CARE TIME Spent:   Minutes non procedure based      Comments   >50% of visit spent in counseling and coordination of care     ________________________________________________________________________  Chris Mclean MD     Procedures: see electronic medical records for all procedures/Xrays and details which were not copied into this note but were reviewed prior to creation of Plan. LABS:  I reviewed today's most current labs and imaging studies.   Pertinent labs include:  Recent Labs      10/17/17   0540  10/16/17   0305  10/15/17   1257   WBC  5.5  5.9  6.0   HGB  10.1*  10.2*  11.5   HCT  32.0*  31.5*  36.2   PLT  352  355  370     Recent Labs      10/17/17   0540  10/16/17   0305  10/15/17   1257   NA  142  141  138   K  3.8  3.9  3.8   CL  106  106  104   CO2  28  27  27   GLU  87  99  109*   BUN  17  19  20   CREA  1.18*  1.20*  1.31*   CA  8.3*  8.2*  8.5   MG  1.9   --   2.0   ALB  2.4*  2.7*  3.2*   TBILI  0.4  0.5  0.7   SGOT  16  16  18   ALT  15  16  18       Signed: Angelito Armas MD

## 2017-10-17 NOTE — PROGRESS NOTES
ADULT PROTOCOL: JET AEROSOL ASSESSMENT    Patient  Zohreh Pelletier     80 y.o.   female     10/17/2017  3:01 PM    Breath Sounds:  Clear bilaterally                                                                  Breathing pattern: Regular    Heart Rate: 94    Resp Rate: 16        Oxygen: O2 Device: Room air        Changed: No      Nebulizer Therapy: Current medications  Q4 Duoneb PRN       Changed: Yes, d/c'd, Pt takes no home tx's      Smoking History: Never Smoked      Problem List:   Patient Active Problem List   Diagnosis Code    Ascites R18.8       Respiratory Therapist: RT Sunny

## 2017-10-17 NOTE — PROGRESS NOTES
Hematology-Oncology Progress Note      Zohreh Pelletier  8/16/1927  [id number]      10/17/2017  1:28 PM    Follow-up for: Ascites    [x] Chart notes since last visit reviewed   [x] Medications reviewed for allergies and interactions    Case discussed with the following:   []               [] Nursing Staff                                                                   [] Pathologist    Subjective:     Spoke with patient who complains of:    Fells much better after the paracentesis. Objective:     Patient Vitals for the past 24 hrs:   BP Temp Pulse Resp SpO2 Weight   10/17/17 0819 125/61 - 92 - - -   10/17/17 0711 115/61 98.2 °F (36.8 °C) 88 14 92 % -   10/16/17 2309 (!) 115/93 98.8 °F (37.1 °C) 85 18 97 % -   10/16/17 1940 121/53 98.8 °F (37.1 °C) 88 20 94 % -   10/16/17 1639 - - - - - 88.3 kg (194 lb 10.7 oz)   10/16/17 1427 137/47 98.2 °F (36.8 °C) 85 20 95 % -       PHYSICAL EXAMINATION   VITAL SIGNS: She is afebrile. T-max is 98.5, pulse is 78 per minute,   respirations are 18-20 per minute, and blood pressure is 127/55,   oxygen saturations are 92%. She has been intermittently on 2 liters of   nasal oxygen. HEENT: Oral mucosa was moist.   GENERAL: Elderly lady, not in acute distress, lying comfortably in bed. NECK: Supple. LYMPHATIC: Showed no evidence of lymphadenopathy in the   cervical, supraclavicular, axillary, or inguinal areas. LUNGS: Decreased breath sounds at both bases, more so on the left   than the right. ABDOMEN: less Distended, doughy abdomen, feeling of nodularity along   the entire abdominal wall/and peritoneum. She has some amount of   shifting dullness as well. Bowel sounds are present but reduced. Cannot palpate any obvious organomegaly. EXTREMITIES: 1+ edema around both ankles ( better)  NEUROLOGIC: No focal neurologic deficits.       Available labs reviewed:  Labs:    Recent Results (from the past 24 hour(s))   CEA    Collection Time: 10/16/17  2:35 PM Result Value Ref Range    CEA 0.8 ng/mL   CBC WITH AUTOMATED DIFF    Collection Time: 10/17/17  5:40 AM   Result Value Ref Range    WBC 5.5 3.6 - 11.0 K/uL    RBC 3.43 (L) 3.80 - 5.20 M/uL    HGB 10.1 (L) 11.5 - 16.0 g/dL    HCT 32.0 (L) 35.0 - 47.0 %    MCV 93.3 80.0 - 99.0 FL    MCH 29.4 26.0 - 34.0 PG    MCHC 31.6 30.0 - 36.5 g/dL    RDW 14.1 11.5 - 14.5 %    PLATELET 606 235 - 339 K/uL    NEUTROPHILS 57 32 - 75 %    LYMPHOCYTES 29 12 - 49 %    MONOCYTES 7 5 - 13 %    EOSINOPHILS 7 0 - 7 %    BASOPHILS 0 0 - 1 %    ABS. NEUTROPHILS 3.1 1.8 - 8.0 K/UL    ABS. LYMPHOCYTES 1.6 0.8 - 3.5 K/UL    ABS. MONOCYTES 0.4 0.0 - 1.0 K/UL    ABS. EOSINOPHILS 0.4 0.0 - 0.4 K/UL    ABS. BASOPHILS 0.0 0.0 - 0.1 K/UL   MAGNESIUM    Collection Time: 10/17/17  5:40 AM   Result Value Ref Range    Magnesium 1.9 1.6 - 2.4 mg/dL   METABOLIC PANEL, COMPREHENSIVE    Collection Time: 10/17/17  5:40 AM   Result Value Ref Range    Sodium 142 136 - 145 mmol/L    Potassium 3.8 3.5 - 5.1 mmol/L    Chloride 106 97 - 108 mmol/L    CO2 28 21 - 32 mmol/L    Anion gap 8 5 - 15 mmol/L    Glucose 87 65 - 100 mg/dL    BUN 17 6 - 20 MG/DL    Creatinine 1.18 (H) 0.55 - 1.02 MG/DL    BUN/Creatinine ratio 14 12 - 20      GFR est AA 52 (L) >60 ml/min/1.73m2    GFR est non-AA 43 (L) >60 ml/min/1.73m2    Calcium 8.3 (L) 8.5 - 10.1 MG/DL    Bilirubin, total 0.4 0.2 - 1.0 MG/DL    ALT (SGPT) 15 12 - 78 U/L    AST (SGOT) 16 15 - 37 U/L    Alk.  phosphatase 54 45 - 117 U/L    Protein, total 5.8 (L) 6.4 - 8.2 g/dL    Albumin 2.4 (L) 3.5 - 5.0 g/dL    Globulin 3.4 2.0 - 4.0 g/dL    A-G Ratio 0.7 (L) 1.1 - 2.2     LACTIC ACID    Collection Time: 10/17/17  5:41 AM   Result Value Ref Range    Lactic acid 1.0 0.4 - 2.0 MMOL/L       Time spent with patient and/or family in counseling:      Assessment and Plan   Functional 80 yr old female    *)New Onset ascites  *)Radiologic picture of abdominal carcinomatosis       A 66-year-old  woman with   history of hypertension, mild chronic kidney disease, but otherwise   healthy nonsmoker, presents with 2-3 week history of decreased   appetite, cough, shortness of breath, abdominal distention, pencil-thin   stools with radiologic picture on CT abdomen, as described above, is   highly suspicious for an abdominal carcinomatosis-type process with   new onset of ascites.      Her CEA is low at 0.8 , other tumor markers are pending. CT chest does not show anyything new or pulmonary mets/masses or lymphadenopathy  Ascitic fluid cytology is pending  Appreciate Dr. Prajapati Hallmark input. She is agreeable to egd and colonoscopy if needed. Physical therapy    Following with you. D/W pt, her son and Dr. Angela Garcia.

## 2017-10-17 NOTE — PROGRESS NOTES
Oncology Nursing Communication Tool      7:32 AM  10/17/2017     Bedside shift change report given to aDrrel Recinos RN (incoming nurse) by Andi Upton (outgoing nurse) on Veto Clayton a 80 y.o. female who was admitted on 10/15/2017 11:47 AM. Report included the following information SBAR, Kardex, Intake/Output, MAR and Recent Results. Significant changes during shift: none      Issues for physician to address: none            Code Status: Full Code     Infections: No current active infections     Allergies: Valsartan     Current diet: DIET CARDIAC Regular       Pain Controlled [x] yes [] no   Bowel Movement [] yes [x] no   Last Bowel Movement (date)     10/16/17            Vital Signs:   Patient Vitals for the past 12 hrs:   Temp Pulse Resp BP SpO2   10/17/17 0711 98.2 °F (36.8 °C) 88 14 115/61 92 %   10/16/17 2309 98.8 °F (37.1 °C) 85 18 (!) 115/93 97 %   10/16/17 1940 98.8 °F (37.1 °C) 88 20 121/53 94 %      Intake & Output:     Intake/Output Summary (Last 24 hours) at 10/17/17 0732  Last data filed at 10/17/17 0037   Gross per 24 hour   Intake                0 ml   Output              200 ml   Net             -200 ml      Laboratory Results:     Recent Results (from the past 12 hour(s))   CBC WITH AUTOMATED DIFF    Collection Time: 10/17/17  5:40 AM   Result Value Ref Range    WBC 5.5 3.6 - 11.0 K/uL    RBC 3.43 (L) 3.80 - 5.20 M/uL    HGB 10.1 (L) 11.5 - 16.0 g/dL    HCT 32.0 (L) 35.0 - 47.0 %    MCV 93.3 80.0 - 99.0 FL    MCH 29.4 26.0 - 34.0 PG    MCHC 31.6 30.0 - 36.5 g/dL    RDW 14.1 11.5 - 14.5 %    PLATELET 717 425 - 148 K/uL    NEUTROPHILS 57 32 - 75 %    LYMPHOCYTES 29 12 - 49 %    MONOCYTES 7 5 - 13 %    EOSINOPHILS 7 0 - 7 %    BASOPHILS 0 0 - 1 %    ABS. NEUTROPHILS 3.1 1.8 - 8.0 K/UL    ABS. LYMPHOCYTES 1.6 0.8 - 3.5 K/UL    ABS. MONOCYTES 0.4 0.0 - 1.0 K/UL    ABS. EOSINOPHILS 0.4 0.0 - 0.4 K/UL    ABS.  BASOPHILS 0.0 0.0 - 0.1 K/UL   MAGNESIUM    Collection Time: 10/17/17 5:40 AM   Result Value Ref Range    Magnesium 1.9 1.6 - 2.4 mg/dL   METABOLIC PANEL, COMPREHENSIVE    Collection Time: 10/17/17  5:40 AM   Result Value Ref Range    Sodium 142 136 - 145 mmol/L    Potassium 3.8 3.5 - 5.1 mmol/L    Chloride 106 97 - 108 mmol/L    CO2 28 21 - 32 mmol/L    Anion gap 8 5 - 15 mmol/L    Glucose 87 65 - 100 mg/dL    BUN 17 6 - 20 MG/DL    Creatinine 1.18 (H) 0.55 - 1.02 MG/DL    BUN/Creatinine ratio 14 12 - 20      GFR est AA 52 (L) >60 ml/min/1.73m2    GFR est non-AA 43 (L) >60 ml/min/1.73m2    Calcium 8.3 (L) 8.5 - 10.1 MG/DL    Bilirubin, total 0.4 0.2 - 1.0 MG/DL    ALT (SGPT) 15 12 - 78 U/L    AST (SGOT) 16 15 - 37 U/L    Alk. phosphatase 54 45 - 117 U/L    Protein, total 5.8 (L) 6.4 - 8.2 g/dL    Albumin 2.4 (L) 3.5 - 5.0 g/dL    Globulin 3.4 2.0 - 4.0 g/dL    A-G Ratio 0.7 (L) 1.1 - 2.2     LACTIC ACID    Collection Time: 10/17/17  5:41 AM   Result Value Ref Range    Lactic acid 1.0 0.4 - 2.0 MMOL/L              Opportunity for questions and clarifications were given to the incoming nurse. Patient's bed is in low position, side rails x2, door open PRN, call bell within reach and patient not in distress.       Silvana Ramirez

## 2017-10-17 NOTE — PROGRESS NOTES
GI Progress Note Isaiah Oneill)  NAME:Dara Romero UBU:[id number] FXJ:[id number]   ATTG: Dr. Diandra Leos   PCP: Lucia Martin MD  Date/Time:  10/17/2017 8:22 AM   Assessment:   · Malignant ascites, likely     Plan:   · Await ascitic fluid cytology  · Await results of tumor markers  · Can tentatively plan for EGD/Colonoscopy based on results of fluid - maybe Thursday? Subjective:   She's agreeable to EGD/Colon, but hoping to avoid them if possible. We can wait for her ascitic fluid to return, and this could show non-GI source. Complaint Y/N Description   Abdominal Pain     Hematemesis     Hematochezia     Melena     Constipation     Diarrhea     Dyspepsia     Dysphagia     Jaundiced     Nausea/vomiting       Review of Systems:  Symptom Y/N Comments  Symptom Y/N Comments   Fever/Chills n   Chest Pain n    Cough n   Headaches n    Sputum n   Joint Pain n    SOB/KING n   Pruritis/Rash n    Tolerating Diet y   Other       Could NOT obtain due to:      Objective:   VITALS:   Last 24hrs VS reviewed since prior progress note. Most recent are:  Visit Vitals    /71 (BP 1 Location: Left arm, BP Patient Position: Sitting)    Pulse (!) 104    Temp 98.6 °F (37 °C)    Resp 18    Ht 5' 3\" (1.6 m)    Wt 88.3 kg (194 lb 10.7 oz)    SpO2 93%    BMI 34.48 kg/m2       Intake/Output Summary (Last 24 hours) at 10/17/17 1622  Last data filed at 10/17/17 0037   Gross per 24 hour   Intake                0 ml   Output              200 ml   Net             -200 ml     PHYSICAL EXAM:  General: WD, WN. Alert, cooperative, no acute distress    HEENT: NC, Atraumatic. Anicteric sclerae. Lungs:  CTA Bilaterally. No Wheezing/Rhonchi/Rales. Heart:  Regular  rhythm,  No murmur (), No Rubs, No Gallops  Abdomen: Soft, midly distended, Non tender.  +Bowel sounds, no HSM  Extremities: No c/c/e  Neurologic:  Alert and oriented X 3. No acute neurological distress   Psych:   Good insight. Not anxious nor agitated.     Lab and Radiology Data Reviewed: (see below)    Medications Reviewed: (see below)  PMH/SH reviewed - no change compared to H&P  ________________________________________________________________________  Total time spent with patient: 15 minutes ________________________________________________________________________  Care Plan discussed with:  Patient y   Family  y   RN               Consultant:       Ciera Harmon MD     Procedures: see electronic medical records for all procedures/Xrays and details which were not copied into this note but were reviewed prior to creation of Plan. LABS:  Recent Labs      10/17/17   0540  10/16/17   0305   WBC  5.5  5.9   HGB  10.1*  10.2*   HCT  32.0*  31.5*   PLT  352  355     Recent Labs      10/17/17   0540  10/16/17   0305  10/15/17   1257   NA  142  141  138   K  3.8  3.9  3.8   CL  106  106  104   CO2  28  27  27   BUN  17  19  20   CREA  1.18*  1.20*  1.31*   GLU  87  99  109*   CA  8.3*  8.2*  8.5   MG  1.9   --   2.0     Recent Labs      10/17/17   0540  10/16/17   0305  10/15/17   1257   SGOT  16  16  18   AP  54  56  64   TP  5.8*  6.1*  6.9   ALB  2.4*  2.7*  3.2*   GLOB  3.4  3.4  3.7     No results for input(s): INR, PTP, APTT in the last 72 hours. No lab exists for component: INREXT   No results for input(s): FE, TIBC, PSAT, FERR in the last 72 hours. No results found for: FOL, RBCF  No results for input(s): PH, PCO2, PO2 in the last 72 hours.   Recent Labs      10/15/17   1257   CPK  45   CKMB  <1.0     Lab Results   Component Value Date/Time    Color DARK YELLOW 10/15/2017 02:01 PM    Appearance CLOUDY 10/15/2017 02:01 PM    Specific gravity 1.026 10/15/2017 02:01 PM    pH (UA) 6.0 10/15/2017 02:01 PM    Protein TRACE 10/15/2017 02:01 PM    Glucose NEGATIVE  10/15/2017 02:01 PM    Ketone NEGATIVE  10/15/2017 02:01 PM    Bilirubin NEGATIVE  02/21/2016 03:10 PM    Urobilinogen 1.0 10/15/2017 02:01 PM    Nitrites NEGATIVE  10/15/2017 02:01 PM    Leukocyte Esterase NEGATIVE 10/15/2017 02:01 PM    Epithelial cells MODERATE 10/15/2017 02:01 PM    Bacteria 1+ 10/15/2017 02:01 PM    WBC 0-4 10/15/2017 02:01 PM    RBC 0-5 10/15/2017 02:01 PM       MEDICATIONS:  Current Facility-Administered Medications   Medication Dose Route Frequency    polyethylene glycol (MIRALAX) packet 17 g  17 g Oral DAILY    influenza vaccine 2017-18 (3 yrs+)(PF) (FLUZONE QUAD/FLUARIX QUAD) injection 0.5 mL  0.5 mL IntraMUSCular PRIOR TO DISCHARGE    furosemide (LASIX) tablet 40 mg  40 mg Oral DAILY    guaiFENesin (ROBITUSSIN) 100 mg/5 mL oral liquid 400 mg  400 mg Oral TID    sodium chloride (NS) flush 5-10 mL  5-10 mL IntraVENous Q8H    sodium chloride (NS) flush 5-10 mL  5-10 mL IntraVENous PRN    amLODIPine (NORVASC) tablet 2.5 mg  2.5 mg Oral DAILY    pantoprazole (PROTONIX) tablet 40 mg  40 mg Oral DAILY    sodium chloride (NS) flush 5-10 mL  5-10 mL IntraVENous Q8H    sodium chloride (NS) flush 5-10 mL  5-10 mL IntraVENous PRN    acetaminophen (TYLENOL) tablet 650 mg  650 mg Oral Q4H PRN    oxyCODONE-acetaminophen (PERCOCET) 5-325 mg per tablet 1 Tab  1 Tab Oral Q4H PRN    naloxone (NARCAN) injection 0.4 mg  0.4 mg IntraVENous PRN    ondansetron (ZOFRAN) injection 4 mg  4 mg IntraVENous Q4H PRN    azithromycin (ZITHROMAX) tablet 250 mg  250 mg Oral DAILY    cefTRIAXone (ROCEPHIN) 1 g in 0.9% sodium chloride (MBP/ADV) 50 mL  1 g IntraVENous Q24H

## 2017-10-17 NOTE — PROGRESS NOTES
Oncology Nursing Communication Tool      7:37 PM  10/17/2017     Bedside and Verbal shift change report given to Shellie Bautista RN (incoming nurse) by Dayton Thomson (outgoing nurse) on Alexsandra Campoverde a 80 y.o. female who was admitted on 10/15/2017 11:47 AM. Report included the following information SBAR, Kardex, Intake/Output, MAR, Accordion and Recent Results. Significant changes during shift: none      Issues for physician to address: none           Code Status: DNR     Infections: No current active infections     Allergies: Valsartan     Current diet: DIET CARDIAC Regular       Pain Controlled [x] yes [] no   Bowel Movement [] yes [x] no   Last Bowel Movement (date)             Vital Signs:   Patient Vitals for the past 12 hrs:   Temp Pulse Resp BP SpO2   10/17/17 1704 97.2 °F (36.2 °C) 88 16 134/62 93 %   10/17/17 1430 98.6 °F (37 °C) (!) 104 18 133/71 93 %   10/17/17 0819 - 92 - 125/61 -      Intake & Output:     Intake/Output Summary (Last 24 hours) at 10/17/17 1937  Last data filed at 10/17/17 0037   Gross per 24 hour   Intake                0 ml   Output              200 ml   Net             -200 ml      Laboratory Results:   No results found for this or any previous visit (from the past 12 hour(s)). Opportunity for questions and clarifications were given to the incoming nurse. Patient's bed is in low position, side rails x2, door open PRN, call bell within reach and patient not in distress.       Dayton Thomson

## 2017-10-18 ENCOUNTER — APPOINTMENT (OUTPATIENT)
Dept: MAMMOGRAPHY | Age: 82
DRG: 375 | End: 2017-10-18
Attending: INTERNAL MEDICINE
Payer: MEDICARE

## 2017-10-18 LAB
CANCER AG125 SERPL-ACNC: 862 U/ML (ref 0–30)
CANCER AG19-9 SERPL-ACNC: 9 U/ML (ref 0–35)
CANCER AG27-29 SERPL-ACNC: 399.7 U/ML (ref 0–38.6)

## 2017-10-18 PROCEDURE — 74011250637 HC RX REV CODE- 250/637: Performed by: INTERNAL MEDICINE

## 2017-10-18 PROCEDURE — 77066 DX MAMMO INCL CAD BI: CPT

## 2017-10-18 PROCEDURE — 74011250636 HC RX REV CODE- 250/636: Performed by: INTERNAL MEDICINE

## 2017-10-18 PROCEDURE — 65270000015 HC RM PRIVATE ONCOLOGY

## 2017-10-18 RX ORDER — ENOXAPARIN SODIUM 100 MG/ML
30 INJECTION SUBCUTANEOUS EVERY 24 HOURS
Status: DISCONTINUED | OUTPATIENT
Start: 2017-10-18 | End: 2017-10-20 | Stop reason: HOSPADM

## 2017-10-18 RX ORDER — LOPERAMIDE HYDROCHLORIDE 2 MG/1
2 CAPSULE ORAL ONCE
Status: COMPLETED | OUTPATIENT
Start: 2017-10-18 | End: 2017-10-18

## 2017-10-18 RX ADMIN — Medication 1 CAPSULE: at 14:48

## 2017-10-18 RX ADMIN — GUAIFENESIN 400 MG: 200 SOLUTION ORAL at 09:31

## 2017-10-18 RX ADMIN — AZITHROMYCIN 250 MG: 250 TABLET, FILM COATED ORAL at 09:30

## 2017-10-18 RX ADMIN — GUAIFENESIN 400 MG: 200 SOLUTION ORAL at 16:50

## 2017-10-18 RX ADMIN — ENOXAPARIN SODIUM 30 MG: 30 INJECTION SUBCUTANEOUS at 11:52

## 2017-10-18 RX ADMIN — PANTOPRAZOLE SODIUM 40 MG: 40 TABLET, DELAYED RELEASE ORAL at 09:30

## 2017-10-18 RX ADMIN — AMLODIPINE BESYLATE 2.5 MG: 2.5 TABLET ORAL at 09:30

## 2017-10-18 RX ADMIN — FUROSEMIDE 40 MG: 40 TABLET ORAL at 09:29

## 2017-10-18 RX ADMIN — LOPERAMIDE HYDROCHLORIDE 2 MG: 2 CAPSULE ORAL at 11:52

## 2017-10-18 NOTE — PROGRESS NOTES
GI Progress Note Cornelio Quiles)  NAME:Dara Smiley :1927 FAY:[id number]   ATTG: Dr. Toño Green   PCP: Sharita Tao MD  Date/Time:  10/18/2017 8:22 AM   Assessment:   · Malignant ascites, likely     Plan:   · Await ascitic fluid cytology  · Tumor markers - , 27.79 up, CEA and  okay  · Can tentatively plan for EGD/Colonoscopy if warranted based on results of  ascites; they'd like to wait and avoid if possible, given that tumor markers suggest ovarian/gyn source. Subjective:   Some tumor markers back. So far no definitive origin clear. Family at bedside. Review of Systems:  Symptom Y/N Comments  Symptom Y/N Comments   Fever/Chills n   Chest Pain n    Cough n   Headaches n    Sputum n   Joint Pain n    SOB/KING n   Pruritis/Rash n    Tolerating Diet y   Other       Could NOT obtain due to:      Objective:   VITALS:   Last 24hrs VS reviewed since prior progress note. Most recent are:  Visit Vitals    /83    Pulse 100    Temp 98.4 °F (36.9 °C)    Resp 17    Ht 5' 3\" (1.6 m)    Wt 88.3 kg (194 lb 10.7 oz)    SpO2 94%    BMI 34.48 kg/m2     No intake or output data in the 24 hours ending 10/18/17 1701  PHYSICAL EXAM:  General: WD, WN. Alert, cooperative, no acute distress    HEENT: NC, Atraumatic. Anicteric sclerae. Lungs:  CTA Bilaterally. No Wheezing/Rhonchi/Rales. Heart:  Regular  rhythm,  No murmur (), No Rubs, No Gallops  Abdomen: Soft, midly distended, Non tender.  +Bowel sounds, no HSM  Neurologic:  Alert and oriented X 3. No acute neurological distress   Psych:   Good insight. Not anxious nor agitated.     Lab and Radiology Data Reviewed: (see below)    Medications Reviewed: (see below)  PMH/SH reviewed - no change compared to H&P  ________________________________________________________________________  Total time spent with patient: 15 minutes ________________________________________________________________________  Care Plan discussed with:  Patient y   Family  y   RN y              Consultant:       Alex Gamble MD     Procedures: see electronic medical records for all procedures/Xrays and details which were not copied into this note but were reviewed prior to creation of Plan. LABS:  Recent Labs      10/17/17   0540  10/16/17   0305   WBC  5.5  5.9   HGB  10.1*  10.2*   HCT  32.0*  31.5*   PLT  352  355     Recent Labs      10/17/17   0540  10/16/17   0305   NA  142  141   K  3.8  3.9   CL  106  106   CO2  28  27   BUN  17  19   CREA  1.18*  1.20*   GLU  87  99   CA  8.3*  8.2*   MG  1.9   --      Recent Labs      10/17/17   0540  10/16/17   0305   SGOT  16  16   AP  54  56   TP  5.8*  6.1*   ALB  2.4*  2.7*   GLOB  3.4  3.4     No results for input(s): INR, PTP, APTT in the last 72 hours. No lab exists for component: INREXT, INREXT   No results for input(s): FE, TIBC, PSAT, FERR in the last 72 hours. No results found for: FOL, RBCF  No results for input(s): PH, PCO2, PO2 in the last 72 hours. No results for input(s): CPK, CKMB in the last 72 hours.     No lab exists for component: TROPONINI  Lab Results   Component Value Date/Time    Color DARK YELLOW 10/15/2017 02:01 PM    Appearance CLOUDY 10/15/2017 02:01 PM    Specific gravity 1.026 10/15/2017 02:01 PM    pH (UA) 6.0 10/15/2017 02:01 PM    Protein TRACE 10/15/2017 02:01 PM    Glucose NEGATIVE  10/15/2017 02:01 PM    Ketone NEGATIVE  10/15/2017 02:01 PM    Bilirubin NEGATIVE  02/21/2016 03:10 PM    Urobilinogen 1.0 10/15/2017 02:01 PM    Nitrites NEGATIVE  10/15/2017 02:01 PM    Leukocyte Esterase NEGATIVE  10/15/2017 02:01 PM    Epithelial cells MODERATE 10/15/2017 02:01 PM    Bacteria 1+ 10/15/2017 02:01 PM    WBC 0-4 10/15/2017 02:01 PM    RBC 0-5 10/15/2017 02:01 PM       MEDICATIONS:  Current Facility-Administered Medications   Medication Dose Route Frequency    enoxaparin (LOVENOX) injection 30 mg  30 mg SubCUTAneous Q24H    lactobac ac& pc-s.therm-b.anim (ZARI Q/RISAQUAD)  1 Cap Oral DAILY    polyethylene glycol (MIRALAX) packet 17 g  17 g Oral DAILY    influenza vaccine 2017-18 (3 yrs+)(PF) (FLUZONE QUAD/FLUARIX QUAD) injection 0.5 mL  0.5 mL IntraMUSCular PRIOR TO DISCHARGE    furosemide (LASIX) tablet 40 mg  40 mg Oral DAILY    guaiFENesin (ROBITUSSIN) 100 mg/5 mL oral liquid 400 mg  400 mg Oral TID    sodium chloride (NS) flush 5-10 mL  5-10 mL IntraVENous Q8H    sodium chloride (NS) flush 5-10 mL  5-10 mL IntraVENous PRN    amLODIPine (NORVASC) tablet 2.5 mg  2.5 mg Oral DAILY    pantoprazole (PROTONIX) tablet 40 mg  40 mg Oral DAILY    sodium chloride (NS) flush 5-10 mL  5-10 mL IntraVENous Q8H    sodium chloride (NS) flush 5-10 mL  5-10 mL IntraVENous PRN    acetaminophen (TYLENOL) tablet 650 mg  650 mg Oral Q4H PRN    oxyCODONE-acetaminophen (PERCOCET) 5-325 mg per tablet 1 Tab  1 Tab Oral Q4H PRN    naloxone (NARCAN) injection 0.4 mg  0.4 mg IntraVENous PRN    ondansetron (ZOFRAN) injection 4 mg  4 mg IntraVENous Q4H PRN

## 2017-10-18 NOTE — PROGRESS NOTES
Oncology Nursing Communication Tool      11:20 PM  10/17/2017     Bedside shift change report given to Mann Royal RN (incoming nurse) by Rene De La Garza RN (outgoing nurse) on Batool Ferreira a 80 y.o. female who was admitted on 10/15/2017 11:47 AM. Report included the following information SBAR, Kardex, Intake/Output, MAR, Accordion, Recent Results and Med Rec Status. Significant changes during shift: None      Issues for physician to address: None            Code Status: DNR     Infections: No current active infections     Allergies: Valsartan     Current diet: DIET CARDIAC Regular       Pain Controlled [x] yes [] no   Bowel Movement [] yes [x] no   Last Bowel Movement (date)                 Vital Signs:   Patient Vitals for the past 12 hrs:   Temp Pulse Resp BP SpO2   10/17/17 2040 97.5 °F (36.4 °C) 99 16 138/59 96 %   10/17/17 1704 97.2 °F (36.2 °C) 88 16 134/62 93 %   10/17/17 1430 98.6 °F (37 °C) (!) 104 18 133/71 93 %      Intake & Output:     Intake/Output Summary (Last 24 hours) at 10/17/17 2320  Last data filed at 10/17/17 0037   Gross per 24 hour   Intake                0 ml   Output              200 ml   Net             -200 ml      Laboratory Results:   No results found for this or any previous visit (from the past 12 hour(s)). Opportunity for questions and clarifications were given to the incoming nurse. Patient's bed is in low position, side rails x2, door open PRN, call bell within reach and patient not in distress.       Rnee De La Garza RN

## 2017-10-18 NOTE — PROGRESS NOTES
Oncology Nursing Communication Tool      7:52 PM  10/18/2017     Bedside and Verbal shift change report given to [facility], RN (incoming nurse) by Jerrod Dan (outgoing nurse) on Veto Clayton a 80 y.o. female who was admitted on 10/15/2017 11:47 AM. Report included the following information SBAR, Kardex, Procedure Summary, Intake/Output, MAR, Accordion and Recent Results. Significant changes during shift: none to note      Issues for physician to address:           Code Status: DNR     Infections: No current active infections     Allergies: Valsartan     Current diet: DIET CARDIAC Regular       Pain Controlled [x] yes [] no   Bowel Movement [x] yes [] no   Last Bowel Movement (date)     10/18/17            Vital Signs:   Patient Vitals for the past 12 hrs:   Temp Pulse Resp BP SpO2   10/18/17 1859 98.2 °F (36.8 °C) 95 18 137/51 92 %   10/18/17 1455 98.4 °F (36.9 °C) 100 17 146/83 94 %   10/18/17 0930 - 93 - 133/56 -      Intake & Output:   No intake or output data in the 24 hours ending 10/18/17 1952   Laboratory Results:   No results found for this or any previous visit (from the past 12 hour(s)). Opportunity for questions and clarifications were given to the incoming nurse. Patient's bed is in low position, side rails x2, door open PRN, call bell within reach and patient not in distress.       Jerrod Dan

## 2017-10-18 NOTE — PROGRESS NOTES
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 Fine is consistent with primary or metastatic neoplastic disease. S/p Paracentesis with drainage of 3.6L  Cont lasix  F/U tumor markers,   Oncology following - may have palliative chemo options if patients goes for it, await pending tumor marker & Fluid cytology  Mammogram as per Oncology  GI consult noted- may get EGD/colonoscopy depending on the results of the Tumor markers & pt's wishes- Thursday? HTN continue norvasc, use hydralazine prn. HLD hold vytorin  GERD continue PPI    Surrogate Decision Maker:  Oldest son  Baseline:   . Claudio Jeanie alone.  Uses walker.          Code status: DNR   Prophylaxis: SCD's  Recommended Disposition: Home w/Family when cancer workup is completed by onco/GI     Subjective:     Chief Complaint / Reason for Physician Visit: F/U Ascites, Abdominal pain, SOB, diarrhea  \"I feel OK except have diarrhea. Discussed with RN events overnight.      Review of Systems:  Symptom Y/N Comments  Symptom Y/N Comments   Fever/Chills n   Chest Pain n    Poor Appetite n   Edema n    Cough n   Abdominal Pain n resolved   Sputum n   Joint Pain     SOB/KING n improved  Pruritis/Rash     Nausea/vomit n   Tolerating PT/OT y    Diarrhea y In last 24 hrs  Tolerating Diet y    Constipation    Other       Could NOT obtain due to:      Objective:     VITALS:   Last 24hrs VS reviewed since prior progress note. Most recent are:  Patient Vitals for the past 24 hrs:   Temp Pulse Resp BP SpO2   10/18/17 0930 - 93 - 133/56 -   10/18/17 0737 98.7 °F (37.1 °C) 90 16 121/54 90 %   10/17/17 2040 97.5 °F (36.4 °C) 99 16 138/59 96 %   10/17/17 1704 97.2 °F (36.2 °C) 88 16 134/62 93 %   10/17/17 1430 98.6 °F (37 °C) (!) 104 18 133/71 93 %     No intake or output data in the 24 hours ending 10/18/17 1349     PHYSICAL EXAM:  General: WD, WN. Alert, cooperative, no acute distress    EENT:  EOMI. Anicteric sclerae. MMM  Resp:  CTA bilaterally, no wheezing or rales. No accessory muscle use  CV:  Regular  rhythm,  No edema  GI:  Soft, mildly distended, Non tender.  +Bowel sounds  Neurologic:  Alert and oriented X 3, normal speech,   Psych:   Good insight. Not anxious nor agitated  Skin:  No rashes. No jaundice    Reviewed most current lab test results and cultures  YES  Reviewed most current radiology test results   YES  Review and summation of old records today    NO  Reviewed patient's current orders and MAR    YES  PMH/SH reviewed - no change compared to H&P  ________________________________________________________________________  Care Plan discussed with:    Comments   Patient x    Family  x Grandson(anaesthesiologist) at bedside   RN x    Care Manager     Consultant  x Dr Jose Gamboa (Oncology)                     Multidiciplinary team rounds were held today with , nursing, pharmacist and clinical coordinator. Patient's plan of care was discussed; medications were reviewed and discharge planning was addressed.      ________________________________________________________________________  Total NON critical care TIME:  26   Minutes    Total CRITICAL CARE TIME Spent:   Minutes non procedure based      Comments   >50% of visit spent in counseling and coordination of care ________________________________________________________________________  Brian Polo MD     Procedures: see electronic medical records for all procedures/Xrays and details which were not copied into this note but were reviewed prior to creation of Plan. LABS:  I reviewed today's most current labs and imaging studies.   Pertinent labs include:  Recent Labs      10/17/17   0540  10/16/17   0305   WBC  5.5  5.9   HGB  10.1*  10.2*   HCT  32.0*  31.5*   PLT  352  355     Recent Labs      10/17/17   0540  10/16/17   0305   NA  142  141   K  3.8  3.9   CL  106  106   CO2  28  27   GLU  87  99   BUN  17  19   CREA  1.18*  1.20*   CA  8.3*  8.2*   MG  1.9   --    ALB  2.4*  2.7*   TBILI  0.4  0.5   SGOT  16  16   ALT  15  16       Signed: Brian Polo MD

## 2017-10-18 NOTE — PROGRESS NOTES
Hematology-Oncology Progress Note      Gildardo Deluca  8/16/1927  [id number]      10/18/2017  1:28 PM    Follow-up for: Ascites    [x] Chart notes since last visit reviewed   [x] Medications reviewed for allergies and interactions    Case discussed with the following:   []               [] Nursing Staff                                                                   [] Pathologist    Subjective:     Spoke with patient who complains of: 3 loose watery BMs since last night, no fevers, no abdo pain, no melena or BRBPR    Fells much better after the paracentesis. Objective:     Patient Vitals for the past 24 hrs:   BP Temp Pulse Resp SpO2   10/18/17 0737 121/54 98.7 °F (37.1 °C) 90 16 90 %   10/17/17 2040 138/59 97.5 °F (36.4 °C) 99 16 96 %   10/17/17 1704 134/62 97.2 °F (36.2 °C) 88 16 93 %   10/17/17 1430 133/71 98.6 °F (37 °C) (!) 104 18 93 %       PHYSICAL EXAMINATION   VITAL SIGNS: She is afebrile. T-max is 98.5, pulse is 78 per minute,   respirations are 18-20 per minute, and blood pressure is 127/55,   oxygen saturations are 92%. She has been intermittently on 2 liters of   nasal oxygen. HEENT: Oral mucosa was moist.   GENERAL: Elderly lady, not in acute distress, lying comfortably in bed. NECK: Supple. LYMPHATIC: Showed no evidence of lymphadenopathy in the   cervical, supraclavicular, axillary, or inguinal areas. LUNGS: Decreased breath sounds at both bases, more so on the left   than the right. ABDOMEN: less Distended, doughy abdomen, feeling of nodularity along   the entire abdominal wall/and peritoneum. She has some amount of   shifting dullness as well. Bowel sounds are present but reduced. Cannot palpate any obvious organomegaly. EXTREMITIES: 1+ edema around both ankles ( better)  NEUROLOGIC: No focal neurologic deficits.       Available labs reviewed:  Labs:      Time spent with patient and/or family in counseling:      Assessment and Plan   Functional 80 yr old female    *)New Onset ascites  *)Radiologic picture of abdominal carcinomatosis       A 75-year-old  woman with   history of hypertension, mild chronic kidney disease, but otherwise   healthy nonsmoker, presents with 2-3 week history of decreased   appetite, cough, shortness of breath, abdominal distention, pencil-thin   stools with radiologic picture on CT abdomen, as described above, is   highly suspicious for an abdominal carcinomatosis-type process with   new onset of ascites.      Her CEA is low at 0.8 ,but ca 19-9 is high at 299.? Breast cancer as primary source? Would be a very unusual presentation . other tumor markers are pending. CT chest does not show anyything new or pulmonary mets/masses or lymphadenopathy  Proceed with a diagnostic lyndsay mammogram as inpatient- to assess for breast ca- New history obtained today from Susana Garibay( anaesthesiologist) -  That 1 yr ago , had a breast biopsy- ? Left side but was benign. Last mammogram at [facility] in [place]. Ascitic fluid cytology is pending- called pathology-no reply  New diarrhea - over night. Imodium X 1 dose- antibiotic related? If these abnormal clinical findings persist, appropriate workup will be completed. The patient understands that follow up is required to elucidate the situation. Persistent- check stool for C diff  Please start DVT prophylaxis- 30 mg sc once daily  Appreciate Dr. Victorina Pool input. Physical therapy    Following with you. D/W pt, her grandson.

## 2017-10-19 ENCOUNTER — APPOINTMENT (OUTPATIENT)
Dept: ULTRASOUND IMAGING | Age: 82
DRG: 375 | End: 2017-10-19
Attending: INTERNAL MEDICINE
Payer: MEDICARE

## 2017-10-19 LAB
ANION GAP SERPL CALC-SCNC: 9 MMOL/L (ref 5–15)
BUN SERPL-MCNC: 17 MG/DL (ref 6–20)
BUN/CREAT SERPL: 14 (ref 12–20)
CALCIUM SERPL-MCNC: 8.3 MG/DL (ref 8.5–10.1)
CHLORIDE SERPL-SCNC: 101 MMOL/L (ref 97–108)
CO2 SERPL-SCNC: 27 MMOL/L (ref 21–32)
CREAT SERPL-MCNC: 1.2 MG/DL (ref 0.55–1.02)
ERYTHROCYTE [DISTWIDTH] IN BLOOD BY AUTOMATED COUNT: 13.8 % (ref 11.5–14.5)
GLUCOSE SERPL-MCNC: 98 MG/DL (ref 65–100)
HCT VFR BLD AUTO: 34.5 % (ref 35–47)
HGB BLD-MCNC: 11.1 G/DL (ref 11.5–16)
MCH RBC QN AUTO: 29.4 PG (ref 26–34)
MCHC RBC AUTO-ENTMCNC: 32.2 G/DL (ref 30–36.5)
MCV RBC AUTO: 91.5 FL (ref 80–99)
PLATELET # BLD AUTO: 373 K/UL (ref 150–400)
POTASSIUM SERPL-SCNC: 3.8 MMOL/L (ref 3.5–5.1)
RBC # BLD AUTO: 3.77 M/UL (ref 3.8–5.2)
SODIUM SERPL-SCNC: 137 MMOL/L (ref 136–145)
WBC # BLD AUTO: 6.5 K/UL (ref 3.6–11)

## 2017-10-19 PROCEDURE — 74011250637 HC RX REV CODE- 250/637: Performed by: INTERNAL MEDICINE

## 2017-10-19 PROCEDURE — 74011250636 HC RX REV CODE- 250/636: Performed by: INTERNAL MEDICINE

## 2017-10-19 PROCEDURE — 36415 COLL VENOUS BLD VENIPUNCTURE: CPT | Performed by: INTERNAL MEDICINE

## 2017-10-19 PROCEDURE — 85027 COMPLETE CBC AUTOMATED: CPT | Performed by: INTERNAL MEDICINE

## 2017-10-19 PROCEDURE — 76830 TRANSVAGINAL US NON-OB: CPT

## 2017-10-19 PROCEDURE — 65270000015 HC RM PRIVATE ONCOLOGY

## 2017-10-19 PROCEDURE — 80048 BASIC METABOLIC PNL TOTAL CA: CPT | Performed by: INTERNAL MEDICINE

## 2017-10-19 PROCEDURE — 76642 ULTRASOUND BREAST LIMITED: CPT

## 2017-10-19 RX ADMIN — ENOXAPARIN SODIUM 30 MG: 30 INJECTION SUBCUTANEOUS at 13:51

## 2017-10-19 RX ADMIN — AMLODIPINE BESYLATE 2.5 MG: 2.5 TABLET ORAL at 08:42

## 2017-10-19 RX ADMIN — FUROSEMIDE 40 MG: 40 TABLET ORAL at 08:42

## 2017-10-19 RX ADMIN — GUAIFENESIN 400 MG: 200 SOLUTION ORAL at 18:04

## 2017-10-19 RX ADMIN — PANTOPRAZOLE SODIUM 40 MG: 40 TABLET, DELAYED RELEASE ORAL at 08:42

## 2017-10-19 RX ADMIN — Medication 10 ML: at 13:51

## 2017-10-19 RX ADMIN — Medication 10 ML: at 13:52

## 2017-10-19 RX ADMIN — Medication 1 CAPSULE: at 08:42

## 2017-10-19 RX ADMIN — GUAIFENESIN 400 MG: 200 SOLUTION ORAL at 08:42

## 2017-10-19 NOTE — PROGRESS NOTES
Hematology-Oncology Progress Note      Addison Wahl  8/16/1927  [id number]      10/19/2017  1:28 PM    Follow-up for: Ascites , peritoneal carcinomatosis   [x] Chart notes since last visit reviewed   [x] Medications reviewed for allergies and interactions    Case discussed with the following:   []               [x] Nursing Staff                                                                   [x] Pathologist    Subjective:     Spoke with patient who complains of: No loose watery BMs since last night, no fevers, no abdo pain, no melena or BRBPR  Abdomen is now feeling more bloated+ again, but no sob. Fells much better after the paracentesis. Objective:     Patient Vitals for the past 24 hrs:   BP Temp Pulse Resp SpO2   10/19/17 1150 127/73 98.4 °F (36.9 °C) 62 18 96 %   10/19/17 0731 130/57 98.2 °F (36.8 °C) 86 20 92 %   10/18/17 2310 121/69 98.3 °F (36.8 °C) 90 14 92 %   10/18/17 1859 137/51 98.2 °F (36.8 °C) 95 18 92 %       PHYSICAL EXAMINATION   VITAL SIGNS: She is afebrile. T-max is 98.5, pulse is 78 per minute,   respirations are 18-20 per minute, and blood pressure is 127/55,   oxygen saturations are 92%. She has been intermittently on 2 liters of   nasal oxygen. HEENT: Oral mucosa was moist.   GENERAL: Elderly lady, not in acute distress, lying comfortably in bed. NECK: Supple. LYMPHATIC: Showed no evidence of lymphadenopathy in the   cervical, supraclavicular, axillary, or inguinal areas. Breast Exam- negative  LUNGS: Decreased breath sounds at both bases, more so on the left   than the right. ABDOMEN: less Distended, doughy abdomen, feeling of nodularity along   the entire abdominal wall/and peritoneum. She has some amount of   shifting dullness as well. Bowel sounds are present but reduced. Cannot palpate any obvious organomegaly. EXTREMITIES: 1+ edema around both ankles ( better)  NEUROLOGIC: No focal neurologic deficits.       Available labs reviewed:  Labs: Time spent with patient and/or family in counseling:      Assessment and Plan   Functional 80 yr old female    *)New Onset ascites  *)Radiologic picture of abdominal carcinomatosis  *)High , CA 27-29       A 80-year-old  woman with   history of hypertension, mild chronic kidney disease, but otherwise   healthy nonsmoker, presents with 2-3 week history of decreased   appetite, cough, shortness of breath, abdominal distention, pencil-thin   stools with radiologic picture on CT abdomen, as described above, is   highly suspicious for an abdominal carcinomatosis-type process with   new onset of ascites.      Her CEA is low at 0.8 ,but ca 19-9 is high at 299.? Breast cancer as primary source? Would be a very unusual presentation . [facility] Mammogram done- negative  Now with  of 862- could be gyn/ovarian primary. D/W Dr. Abelino Oliver from [facility] oncology- for consult. Pelvic transvag usg was recommended and ordered  CT chest does not show anyything new or pulmonary mets/masses or lymphadenopathy  Ascitic fluid cytology is still pending  New diarrhea - over night. Imodium X 1 dose- antibiotic related? Better . No more BMs.-   Please start DVT prophylaxis- 30 mg sc once daily  Appreciate Dr. Samir Quintero input. Physical therapy    Following with you. D/W pt, her grandson.

## 2017-10-19 NOTE — CONSULTS
Consult    Patient: Martine Lewis MRN: [id number]  SSN: [id number]    YOB: 1927  Age: 80 y.o. Sex: female      Subjective:      Martine Lewis is a 80 y.o. female who was admitted for SOB and found to have malignant ascites and carcinomatosis on CT. She was having early satiety over the last couple weeks, and complained her pants were tight this past week. No pain, no N/V. Noticed narrower stools. She has a history of ovary-sparing hysterectomy in her 52's. She lives independently and was in good health prior to this admission. She has elevated  of 862. The CA27-29 also elevated but mammogram normal today. CEA and  are normal. Cytology from the paracentesis is still pending. Past Medical History:   Diagnosis Date    Autoimmune disease ([address]     Chronic kidney disease     GERD (gastroesophageal reflux disease)     Hypertension     Murmur      Past Surgical History:   Procedure Laterality Date    HX BREAST BIOPSY Left     3 yrs ago----neg    HX HYSTERECTOMY      HX OTHER SURGICAL      Parathyroidectomy       History reviewed. No pertinent family history. Social History   Substance Use Topics    Smoking status: Never Smoker    Smokeless tobacco: Never Used    Alcohol use No      Current Facility-Administered Medications   Medication Dose Route Frequency Provider Last Rate Last Dose    enoxaparin (LOVENOX) injection 30 mg  30 mg SubCUTAneous Q24H Purvi K. Dayton Mortimer, MD   30 mg at 10/19/17 1351    lactobac ac& pc-s.therm-b.anim (ZARI Q/RISAQUAD)  1 Cap Oral DAILY Daryle Crosser, MD   1 Cap at 10/19/17 2693    polyethylene glycol (MIRALAX) packet 17 g  17 g Oral DAILY Ciera Harmon MD   Stopped at 10/18/17 0931    influenza vaccine 2017-18 (3 yrs+)(PF) (FLUZONE QUAD/FLUARIX QUAD) injection 0.5 mL  0.5 mL IntraMUSCular PRIOR TO DISCHARGE Prudence MD Ghazala        furosemide (LASIX) tablet 40 mg  40 mg Oral DAILY Prudence MD Ghazala   40 mg at 10/19/17 2616    guaiFENesin (ROBITUSSIN) 100 mg/5 mL oral liquid 400 mg  400 mg Oral TID Stephen Augustin MD   400 mg at 10/19/17 0842    sodium chloride (NS) flush 5-10 mL  5-10 mL IntraVENous Q8H Rolly Agreste, DO   10 mL at 10/19/17 1352    sodium chloride (NS) flush 5-10 mL  5-10 mL IntraVENous PRN [place]rier Agreste, DO        amLODIPine (NORVASC) tablet 2.5 mg  2.5 mg Oral DAILY Eric Lipscomb MD   2.5 mg at 10/19/17 0842    pantoprazole (PROTONIX) tablet 40 mg  40 mg Oral DAILY Eric Lipscomb MD   40 mg at 10/19/17 0842    sodium chloride (NS) flush 5-10 mL  5-10 mL IntraVENous Q8H Eric Lipscomb MD   10 mL at 10/19/17 1351    sodium chloride (NS) flush 5-10 mL  5-10 mL IntraVENous PRN Eric Lipscomb MD        acetaminophen (TYLENOL) tablet 650 mg  650 mg Oral Q4H PRN Eric Lipscomb MD        oxyCODONE-acetaminophen (PERCOCET) 5-325 mg per tablet 1 Tab  1 Tab Oral Q4H PRN Eric Lipscomb MD        [facility]) injection 0.4 mg  0.4 mg IntraVENous PRN Eric Lipscomb MD        ondansetron [facility]) injection 4 mg  4 mg IntraVENous Q4H PRN Eric Lipscomb MD            Allergies   Allergen Reactions    Valsartan Other (comments)     malaise       Review of Systems:  A comprehensive review of systems was negative except for that written in the History of Present Illness. Objective:     Vitals:    10/18/17 1859 10/18/17 2310 10/19/17 0731 10/19/17 1150   BP: 137/51 121/69 130/57 127/73   Pulse: 95 90 86 62   Resp: 18 14 20 18   Temp: 98.2 °F (36.8 °C) 98.3 °F (36.8 °C) 98.2 °F (36.8 °C) 98.4 °F (36.9 °C)   SpO2: 92% 92% 92% 96%   Weight:       Height:            Physical Exam:  GENERAL: alert, cooperative, no distress, appears stated age  ABDOMEN: soft, distended, ascites, nontender  EXTREMITIES:  extremities normal, atraumatic, no cyanosis or edema  SKIN: Normal.  NEUROLOGIC: negative, AOx3.    PSYCHIATRIC: non focal      Assessment:     Hospital Problems  Never Reviewed          Codes Class Noted POA    Ascites ICD-10-CM: R18.8  ICD-9-CM: 789.59  10/15/2017 Unknown              Plan:     79 yo with carcinomatosis, ascites and elevated  and CA27-29.   -Findings are concerning for ovarian cancer, especially since breast imaging is negative today.   -Cytology from paracentesis is still pending, hopefully back tomorrow.  -Pelvic US performed today showed tumor nodules and ascites in the pelvis, no clear visualization of the ovaries. -Assuming the diagnosis confirms ovarian cancer, I discussed management of ovarian cancer and recommend neoadjuvant chemotherapy with carboplatin which is typically well tolerated. Could consider adding a taxane depending on her tolerability. Discussed curative-intent approach involves multi agent chemo and debulking surgery. Discussed palliation with hospice as an alternative if she doesn't want to proceed with therapy. Family will discuss options.  -I will see her again tomorrow afternoon, and hopefull cytology will result by then.     Signed By: Lizzeth Anne MD     October 19, 2017      [address] [facility] Gynecologic Oncology  [phone]

## 2017-10-19 NOTE — PROGRESS NOTES
GI Progress Note [facility])  NAME:Dara Smith :1927 FRB:[id number]   ATTG: Dr. Trudy Hdz   PCP: Krish Hager MD  Date/Time:  10/19/2017 8:22 AM   Assessment:   · Malignant ascites, likely     Plan:   · Await ascitic fluid cytology - maybe back tomorrow  · Tumor markers - , 27.79 up, CEA and  okay  · If cytology or gyn eval unrevealing, will plan for EGD/Colonoscopy     Subjective:   Plans for Gyn oncology evaluation, pelvic/TV US. Review of Systems:  Symptom Y/N Comments  Symptom Y/N Comments   Fever/Chills n   Chest Pain n    Cough n   Headaches n    Sputum n   Joint Pain n    SOB/KING n   Pruritis/Rash n    Tolerating Diet y   Other       Could NOT obtain due to:      Objective:   VITALS:   Last 24hrs VS reviewed since prior progress note. Most recent are:  Visit Vitals    /73 (BP 1 Location: Left arm, BP Patient Position: Sitting)    Pulse 62    Temp 98.4 °F (36.9 °C)    Resp 18    Ht 5' 3\" (1.6 m)    Wt 88.3 kg (194 lb 10.7 oz)    SpO2 96%    BMI 34.48 kg/m2       Intake/Output Summary (Last 24 hours) at 10/19/17 1630  Last data filed at 10/19/17 0843   Gross per 24 hour   Intake              480 ml   Output                0 ml   Net              480 ml     PHYSICAL EXAM:  General: WD, WN. Alert, cooperative, no acute distress    HEENT: NC, Atraumatic. Anicteric sclerae. Lungs:  CTA Bilaterally. No Wheezing/Rhonchi/Rales. Heart:  Regular  rhythm,  No murmur (), No Rubs, No Gallops  Abdomen: Soft, midly distended, Non tender.  +Bowel sounds, no HSM  Neurologic:  Alert and oriented X 3. No acute neurological distress   Psych:   Good insight. Not anxious nor agitated.     Lab and Radiology Data Reviewed: (see below)    Medications Reviewed: (see below)  PMH/SH reviewed - no change compared to H&P  ________________________________________________________________________  Total time spent with patient: 15 minutes ________________________________________________________________________  Care Plan discussed with:  Patient y   Family  y   RN y              Consultant:       Yvonne Stark MD     Procedures: see electronic medical records for all procedures/Xrays and details which were not copied into this note but were reviewed prior to creation of Plan. LABS:  Recent Labs      10/19/17   0607  10/17/17   0540   WBC  6.5  5.5   HGB  11.1*  10.1*   HCT  34.5*  32.0*   PLT  373  352     Recent Labs      10/19/17   0607  10/17/17   0540   NA  137  142   K  3.8  3.8   CL  101  106   CO2  27  28   BUN  17  17   CREA  1.20*  1.18*   GLU  98  87   CA  8.3*  8.3*   MG   --   1.9     Recent Labs      10/17/17   0540   SGOT  16   AP  54   TP  5.8*   ALB  2.4*   GLOB  3.4     No results for input(s): INR, PTP, APTT in the last 72 hours. No lab exists for component: INREXT, INREXT   No results for input(s): FE, TIBC, PSAT, FERR in the last 72 hours. No results found for: FOL, RBCF  No results for input(s): PH, PCO2, PO2 in the last 72 hours. No results for input(s): CPK, CKMB in the last 72 hours.     No lab exists for component: TROPONINI  Lab Results   Component Value Date/Time    Color DARK YELLOW 10/15/2017 02:01 PM    Appearance CLOUDY 10/15/2017 02:01 PM    Specific gravity 1.026 10/15/2017 02:01 PM    pH (UA) 6.0 10/15/2017 02:01 PM    Protein TRACE 10/15/2017 02:01 PM    Glucose NEGATIVE  10/15/2017 02:01 PM    Ketone NEGATIVE  10/15/2017 02:01 PM    Bilirubin NEGATIVE  02/21/2016 03:10 PM    Urobilinogen 1.0 10/15/2017 02:01 PM    Nitrites NEGATIVE  10/15/2017 02:01 PM    Leukocyte Esterase NEGATIVE  10/15/2017 02:01 PM    Epithelial cells MODERATE 10/15/2017 02:01 PM    Bacteria 1+ 10/15/2017 02:01 PM    WBC 0-4 10/15/2017 02:01 PM    RBC 0-5 10/15/2017 02:01 PM       MEDICATIONS:  Current Facility-Administered Medications   Medication Dose Route Frequency    enoxaparin (LOVENOX) injection 30 mg  30 mg SubCUTAneous Q24H    lactobac ac& pc-s.therm-b.anim (ZARI Q/RISAQUAD)  1 Cap Oral DAILY    polyethylene glycol (MIRALAX) packet 17 g  17 g Oral DAILY    influenza vaccine 2017-18 (3 yrs+)(PF) (FLUZONE QUAD/FLUARIX QUAD) injection 0.5 mL  0.5 mL IntraMUSCular PRIOR TO DISCHARGE    furosemide (LASIX) tablet 40 mg  40 mg Oral DAILY    guaiFENesin (ROBITUSSIN) 100 mg/5 mL oral liquid 400 mg  400 mg Oral TID    sodium chloride (NS) flush 5-10 mL  5-10 mL IntraVENous Q8H    sodium chloride (NS) flush 5-10 mL  5-10 mL IntraVENous PRN    amLODIPine (NORVASC) tablet 2.5 mg  2.5 mg Oral DAILY    pantoprazole (PROTONIX) tablet 40 mg  40 mg Oral DAILY    sodium chloride (NS) flush 5-10 mL  5-10 mL IntraVENous Q8H    sodium chloride (NS) flush 5-10 mL  5-10 mL IntraVENous PRN    acetaminophen (TYLENOL) tablet 650 mg  650 mg Oral Q4H PRN    oxyCODONE-acetaminophen (PERCOCET) 5-325 mg per tablet 1 Tab  1 Tab Oral Q4H PRN    naloxone (NARCAN) injection 0.4 mg  0.4 mg IntraVENous PRN    ondansetron (ZOFRAN) injection 4 mg  4 mg IntraVENous Q4H PRN

## 2017-10-19 NOTE — PROGRESS NOTES
Problem: Falls - Risk of  Goal: *Absence of Falls  Document Dong Fall Risk and appropriate interventions in the flowsheet.    Outcome: Progressing Towards Goal  Fall Risk Interventions:  Mobility Interventions: Patient to call before getting OOB         Medication Interventions: Patient to call before getting OOB    Elimination Interventions: Call light in reach    History of Falls Interventions: Consult care management for discharge planning

## 2017-10-19 NOTE — PROGRESS NOTES
Oncology Nursing Communication Tool      7:36 PM  10/19/2017     Bedside shift change report given to [facility], RN (incoming nurse) by Fouzia Buenrostro RN (outgoing nurse) on Janese Shown a 80 y.o. female who was admitted on 10/15/2017 11:47 AM. Report included the following information SBAR and Kardex. Significant changes during shift: transvaginal ultrasound today. Waiting on cytology      Issues for physician to address: none            Code Status: DNR     Infections: No current active infections     Allergies: Valsartan     Current diet: DIET CARDIAC Regular       Pain Controlled [] yes [] no   Bowel Movement [] yes [] no   Last Bowel Movement (date)                  Vital Signs:   Patient Vitals for the past 12 hrs:   Temp Pulse Resp BP SpO2   10/19/17 1150 98.4 °F (36.9 °C) 62 18 127/73 96 %      Intake & Output:     Intake/Output Summary (Last 24 hours) at 10/19/17 1936  Last data filed at 10/19/17 0843   Gross per 24 hour   Intake              480 ml   Output                0 ml   Net              480 ml      Laboratory Results:   No results found for this or any previous visit (from the past 12 hour(s)). Opportunity for questions and clarifications were given to the incoming nurse. Patient's bed is in low position, side rails x2, door open PRN, call bell within reach and patient not in distress.       Fouzia Buenrostro, RN

## 2017-10-19 NOTE — PROGRESS NOTES
Nutrition Services      Nutrition Screen:  Wt Readings from Last 10 Encounters:   10/16/17 88.3 kg (194 lb 10.7 oz)   02/21/16 83.9 kg (185 lb)   03/24/14 82.5 kg (181 lb 12.8 oz)     Body mass index is 34.48 kg/(m^2). Supplements: Ensure Clear to try/ dinner                        __x___ ordered ______  declined. __ __  Pt is nutritionally stable at this time, will rescreen in 7 days. __ __    Pt is at nutritional risk and will be rescreened in  days. __x __  Pt is at moderate or high nutritional risk, will refer to RD for assessment.        Yara Rodriguez  Dietetic Technician, Registered

## 2017-10-19 NOTE — PROGRESS NOTES
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 Fine is consistent with primary or metastatic neoplastic disease. S/p Paracentesis with drainage of 3.6L  Cont lasix  F/U tumor markers,   Oncology following - may have palliative chemo options if patients goes for it, await Fluid cytology  Mammogram as per Oncology  GI consult noted- may get EGD/colonoscopy depending on the results of the Tumor markers & pt's wishes- tumor markers suggest Ovarian/GYN source so may defer GI endoscopy at this time  GYN Oncology consulted today  Trans Vag Pelvic ultrasound ordered    HTN continue norvasc, use hydralazine prn. HLD hold vytorin  GERD continue PPI    Surrogate Decision Maker:  Oldest son  Baseline:   . Pb Dries alone.  Uses walker.          Code status: DNR   Prophylaxis: SCD's  Recommended Disposition: Home w/Family when cancer workup is completed by onco     Subjective:     Chief Complaint / Reason for Physician Visit: F/U Ascites, Abdominal pain, SOB, diarrhea  \"I feel OK . Discussed with RN events overnight.      Review of Systems:  Symptom Y/N Comments  Symptom Y/N Comments   Fever/Chills n   Chest Pain n    Poor Appetite n   Edema n    Cough n   Abdominal Pain n resolved   Sputum n   Joint Pain     SOB/KING n improved  Pruritis/Rash     Nausea/vomit n   Tolerating PT/OT y Diarrhea n Resolved off Abx  Tolerating Diet y    Constipation    Other       Could NOT obtain due to:      Objective:     VITALS:   Last 24hrs VS reviewed since prior progress note. Most recent are:  Patient Vitals for the past 24 hrs:   Temp Pulse Resp BP SpO2   10/19/17 1150 98.4 °F (36.9 °C) 62 18 127/73 96 %   10/19/17 0731 98.2 °F (36.8 °C) 86 20 130/57 92 %   10/18/17 2310 98.3 °F (36.8 °C) 90 14 121/69 92 %   10/18/17 1859 98.2 °F (36.8 °C) 95 18 137/51 92 %       Intake/Output Summary (Last 24 hours) at 10/19/17 1528  Last data filed at 10/19/17 0843   Gross per 24 hour   Intake              480 ml   Output                0 ml   Net              480 ml        PHYSICAL EXAM:  General: WD, WN. Alert, cooperative, no acute distress    EENT:  EOMI. Anicteric sclerae. MMM  Resp:  CTA bilaterally, no wheezing or rales. No accessory muscle use  CV:  Regular  rhythm,  No edema  GI:  Soft, mildly distended, Non tender.  +Bowel sounds  Neurologic:  Alert and oriented X 3, normal speech,   Psych:   Good insight. Not anxious nor agitated  Skin:  No rashes. No jaundice    Reviewed most current lab test results and cultures  YES  Reviewed most current radiology test results   YES  Review and summation of old records today    NO  Reviewed patient's current orders and MAR    YES  PMH/SH reviewed - no change compared to H&P  ________________________________________________________________________  Care Plan discussed with:    Comments   Patient x    Family  x son at bedside   RN x    Care Manager x Amada Blackburn   Consultant  x Dr Jose Gamboa (Oncology)                     Multidiciplinary team rounds were held today with , nursing, pharmacist and clinical coordinator. Patient's plan of care was discussed; medications were reviewed and discharge planning was addressed.      ________________________________________________________________________  Total NON critical care TIME:  26   Minutes    Total CRITICAL CARE TIME Spent: Minutes non procedure based      Comments   >50% of visit spent in counseling and coordination of care     ________________________________________________________________________  Ezequiel Bey MD     Procedures: see electronic medical records for all procedures/Xrays and details which were not copied into this note but were reviewed prior to creation of Plan. LABS:  I reviewed today's most current labs and imaging studies.   Pertinent labs include:  Recent Labs      10/19/17   0607  10/17/17   0540   WBC  6.5  5.5   HGB  11.1*  10.1*   HCT  34.5*  32.0*   PLT  373  352     Recent Labs      10/19/17   0607  10/17/17   0540   NA  137  142   K  3.8  3.8   CL  101  106   CO2  27  28   GLU  98  87   BUN  17  17   CREA  1.20*  1.18*   CA  8.3*  8.3*   MG   --   1.9   ALB   --   2.4*   TBILI   --   0.4   SGOT   --   16   ALT   --   15       Signed: Ezequiel Bey MD

## 2017-10-20 ENCOUNTER — APPOINTMENT (OUTPATIENT)
Dept: ULTRASOUND IMAGING | Age: 82
DRG: 375 | End: 2017-10-20
Attending: INTERNAL MEDICINE
Payer: MEDICARE

## 2017-10-20 VITALS
BODY MASS INDEX: 32.46 KG/M2 | HEIGHT: 63 IN | HEART RATE: 85 BPM | OXYGEN SATURATION: 91 % | RESPIRATION RATE: 18 BRPM | DIASTOLIC BLOOD PRESSURE: 52 MMHG | TEMPERATURE: 98.2 F | SYSTOLIC BLOOD PRESSURE: 132 MMHG | WEIGHT: 183.2 LBS

## 2017-10-20 LAB
BACTERIA SPEC CULT: NORMAL
BACTERIA SPEC CULT: NORMAL
GRAM STN SPEC: NORMAL
SERVICE CMNT-IMP: NORMAL
SERVICE CMNT-IMP: NORMAL

## 2017-10-20 PROCEDURE — 74011250637 HC RX REV CODE- 250/637: Performed by: INTERNAL MEDICINE

## 2017-10-20 PROCEDURE — 97162 PT EVAL MOD COMPLEX 30 MIN: CPT

## 2017-10-20 PROCEDURE — 97165 OT EVAL LOW COMPLEX 30 MIN: CPT

## 2017-10-20 PROCEDURE — 93970 EXTREMITY STUDY: CPT

## 2017-10-20 PROCEDURE — G8978 MOBILITY CURRENT STATUS: HCPCS

## 2017-10-20 PROCEDURE — G8979 MOBILITY GOAL STATUS: HCPCS

## 2017-10-20 PROCEDURE — G8987 SELF CARE CURRENT STATUS: HCPCS

## 2017-10-20 RX ORDER — FUROSEMIDE 40 MG/1
40 TABLET ORAL DAILY
Qty: 30 TAB | Refills: 0 | Status: SHIPPED | OUTPATIENT
Start: 2017-10-20 | End: 2017-12-20 | Stop reason: ALTCHOICE

## 2017-10-20 RX ADMIN — POLYETHYLENE GLYCOL 3350 17 G: 17 POWDER, FOR SOLUTION ORAL at 08:24

## 2017-10-20 RX ADMIN — AMLODIPINE BESYLATE 2.5 MG: 2.5 TABLET ORAL at 08:24

## 2017-10-20 RX ADMIN — GUAIFENESIN 400 MG: 200 SOLUTION ORAL at 08:24

## 2017-10-20 RX ADMIN — FUROSEMIDE 40 MG: 40 TABLET ORAL at 08:24

## 2017-10-20 RX ADMIN — PANTOPRAZOLE SODIUM 40 MG: 40 TABLET, DELAYED RELEASE ORAL at 08:24

## 2017-10-20 RX ADMIN — GUAIFENESIN 400 MG: 200 SOLUTION ORAL at 15:22

## 2017-10-20 RX ADMIN — Medication 1 CAPSULE: at 08:24

## 2017-10-20 NOTE — PROGRESS NOTES
Spiritual Care Assessment/Progress Notes    Roopa Latif [id number]  [id number]    8/16/1927  80 y.o.  female    Patient Telephone Number: [phone] (home)   Anabaptism Affiliation: Rolo Harvey   Language: English   Extended Emergency Contact Information  Primary Emergency Contact: [address] Phone: [phone]  Relation: Child  Secondary Emergency Contact: [address] [employer] [facility] [address] Phone: [phone]  Relation: None   Patient Active Problem List    Diagnosis Date Noted    Ascites 10/15/2017        Date: 10/20/2017       Level of Anabaptism/Spiritual Activity:  [x]         Involved in franklyn tradition/spiritual practice    []         Not involved in franklyn tradition/spiritual practice  [x]         Spiritually oriented    []         Claims no spiritual orientation    []         seeking spiritual identity  []         Feels alienated from Scientology practice/tradition  []         Feels angry about Scientology practice/tradition  [x]         Spirituality/Scientology tradition is a resource for coping at this time.   []         Not able to assess due to medical condition    Services Provided Today:  []         crisis intervention    []         reading Scriptures  [x]         spiritual assessment    [x]         prayer  [x]         empathic listening/emotional support  []         rites and rituals (cite in comments)  []         life review     []         Scientology support  []         theological development    []         advocacy  []         ethical dialog     []         blessing  []         bereavement support    []         support to family  []         anticipatory grief support   []         help with AMD  []         spiritual guidance    []         meditation      Spiritual Care Needs  [x]         Emotional Support  [x]         Spiritual/Anabaptism Care  []         Loss/Adjustment  []         Advocacy/Referral                /Ethics  []         No needs expressed at this time  []         Other: (note in               comments)  Spiritual Care Plan  []         Follow up visits with               pt/family  []         Provide materials  []         Schedule sacraments  []         Contact Community               Clergy  [x]         Follow up as needed  []         Other: (note in               comments)     Comments:   Initial visit on Oncology unit for spiritual assessment. Patient's grandson, Amanda Alegria, was visiting from [place]. Provided ministry of presence and supportive listening as patient talked about her supportive family--she has two sons and four grandchildren. She shared that someone in the family has been with her throughout her stay. Mrs. Jannie Wise is Catholic and belongs to a [employer] in [facility]. Her  has been to visit. She shared briefly about her diagnosis, saying she has put her trust in God and will take one day at a time. She added that at 80, she has had a wonderful life and is grateful. She was receptive to assurance of prayer. Advised her of  availability.   Irvin Patient, MPS, [address] [facility], [address] [place] [address] Avenue    [address] [place] [address] Road Paging Service  [phone]

## 2017-10-20 NOTE — PROGRESS NOTES
[employer] asked me to call to advise that Ms. Carl's nurse, Pedro Luis Hurd to have the oncology nurse navigator coordinating to have the [address], Biopsy, and fluid drained on same day or schedule appointments accordingly. Leonor Horton RN advised that she spoke w/[facility] and appt was scheduled for 10/25/17 however is did not show on pt's screen as having an appointment. I left a message with Arabella Oconnell and also called & spoke w/ER case management to inquire who to call for this pt since she is getting discharged tonight. Concensus was to have Dr. Puja Jaimes navigator to call pt on Monday to get appointments organized. I relayed this information to Leonor Horton RN so she could call or consult Dr. Reno Mccord navigator. Leonor Horton RN advised she would give this in Ms. Carl's discharge instructions.

## 2017-10-20 NOTE — PROGRESS NOTES
Met with pt. and her son this am to discuss discharge plans. She was living alone and managing well before she had the ascites. She plans to go home alone , but did admit that she is a little weak. No complaints of pain. PT/OT will see her this am and evaluate. We did talk about possibility of needing home therapy. She has a cane and a RW at home. Her younger son lives very close, and the older one lives in [facility] [place]. She has grandchildren and great grandchildren who are very attentive. CM will check with PT/OT after her eval. To see what her needs are. -- Amada Mcgregor,RN,[facility]--[phone]       Pt. Left before I could see her again. I called and left a message informing her that I would set up [facility] with [facility], out of [address]. Pt. Lives in [place], near [place]. I gave her my no. , in case she wanted a different agency. Sent referral on ECIN. Will follow up on Mon. --- Dominick Murillo [facility]--[phone]    Care Management Interventions  PCP Verified by CM:  Yes  Transition of Care Consult (CM Consult): Home Health, Discharge  [phone] [address]: No  Reason Outside [facility]: Out of service area  [address] [place] [place]: No  Discharge Durable Medical Equipment: No  Health Maintenance Reviewed: Yes  Physical Therapy Consult: Yes  Occupational Therapy Consult: Yes  Speech Therapy Consult: No  Current Support Network: Lives Alone, Own Home, Family Lives Nearby  Confirm Follow Up Transport: Family  Plan discussed with Pt/Family/Caregiver: Yes  Freedom of Choice Offered: Yes  Discharge Location  Discharge Placement: Home with home health ([place]

## 2017-10-20 NOTE — DISCHARGE INSTRUCTIONS
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 · Keep your medication in the bottles provided by the pharmacist and keep a list of the medication names, dosages, and times to be taken in your wallet. · Do not take other medications without consulting your doctor. Pain Management: per above medications    What to do at Home    Recommended diet:  Cardiac Diet    Recommended activity: Activity as tolerated    If you have questions regarding the hospital related prescriptions or hospital related issues please call [facility] maryanne Taylor at [phone]. If you experience any of the following symptoms then please call your primary care physician or return to the emergency room if you cannot get hold of your doctor:  Fever, chills, nausea, vomiting, diarrhea, change in mentation, falling, bleeding, shortness of breath,     Follow Up:  Dr. Fredrik Homans, MD  you are to call and set up an appointment to see them in 7-10 days. Dr Alirio Finch (GYN/Oncology)- F/u in the office on  and plan for possible treatment that day with carboplatin AUC5.   Dr Umer Bean (Medical Oncologist)- for OP Biopsy & Paracentesis as scheduled on tuesday    Information obtained by :  I understand that if any problems occur once I am at home I am to contact my physician. I understand and acknowledge receipt of the instructions indicated above.                                                                                                                                            Physician's or R.N.'s Signature                                                                  Date/Time                                                                                                                                              Patient or Representative Signature                                                          Date/Time

## 2017-10-20 NOTE — PROGRESS NOTES
Home Health Care Discharge Planning: [facility]  Face to Face Encounter      NAME: Cyrus Das   :  1927   MRN:  [id number]     Primary Diagnosis: carcinomatosis peritonii, Ascites malignant, Pleural effusion    Date of Face to Face:  10/20/2017 4:24 PM                                  Face to Face Encounter findings are related to primary reason for home care:   YES    1. I certify that the patient needs intermittent skilled nursing care, physical therapy and/or speech therapy. I will not be following this patient in the Community and Dr. Safia Sutton MD will be responsible for signing the [address] 2. Initial Orders for Care: Physical Therapy    3. I certify that this patient is homebound because of illness or injury, need the aid of supportive devices such as crutches, canes, wheelchairs, and walkers; the use of special transportation; or the assistance of another person in order to leave their place of residence. There exists a normal inability to leave home and leaving home requires a considerable and taxing effort. 4. I certify that this patient is under my care and that I had a Face-to-Face Encounter that meets the physician Face-to-Face Encounter requirements. Document the physical findings from the [address] [place] [address] Encounter that support the need for skilled services: Has new finding of weakness and altered mobility that requires skilled physical therapy services for evaluation and interventions.      Kiki Alcaraz MD  Discharging Physician  Office: [phone]  Fax:   [phone]

## 2017-10-20 NOTE — DISCHARGE SUMMARY
Hospitalist Discharge Summary     Patient ID:  Shaylee Arcos  [id number]  43 y.o.  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  female with hx HTN, HLD, and GERD who presents with 3 weeks of decreased appetite, dry cough, wheezing, SOB and generalized weakness that have been progressively worsening. Patient was seen at an urgent care center 3 days ago, where she had an xray, and she was sent home on Lasix without significant improvement in her symptoms. They presented this time to this ER for another opinion. CT abdomen demonstrates  Moderate to large amount of ascites with numerous ascitic nodules as well as omental and transverse mesocolic caking. Fine is consistent with primary or metastatic neoplastic disease.  Small to moderate left pleural effusion. Patchy left basilar atelectasis versus pneumonia. We were asked to admit for work up and evaluation of the above problems\"    ______________________________________________________________________  DISCHARGE SUMMARY/HOSPITAL COURSE:  for full details see H&P, daily progress notes, labs, consult notes.      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 Fine is consistent with primary or metastatic neoplastic disease. S/p Paracentesis with drainage of 3.6L  Cont lasix  F/U tumor markers,   Oncology following - may have palliative chemo options if patients goes for it, await Fluid cytology  Mammogram as per Oncology  GI consult noted- may get EGD/colonoscopy depending on the results of the Tumor markers & pt's wishes- tumor markers suggest Ovarian/GYN source so may defer GI endoscopy at this time  GYN Oncology consulted today  Trans Vag Pelvic ultrasound ordered     HTN continue norvasc, use hydralazine prn. HLD hold vytorin  GERD continue PPI     Surrogate Decision Maker:  Oldest son  Baseline:   . Rhett Orris alone.  Uses walker.             Code status: DNR         _______________________________________________________________________  Patient seen and examined by me on discharge day. Pertinent Findings:  Gen:    Not in distress  Chest: Clear lungs  CVS:   Regular rhythm. No edema  Abd:  Soft, not distended, not tender  Neuro:  Alert, Oriented x 3  _______________________________________________________________________  DISCHARGE MEDICATIONS:   Current Discharge Medication List      CONTINUE these medications which have CHANGED    Details   furosemide (LASIX) 40 mg tablet Take 1 Tab by mouth daily. Qty: 30 Tab, Refills: 0         CONTINUE these medications which have NOT CHANGED    Details   aspirin 81 mg chewable tablet Take 81 mg by mouth daily. amLODIPine (NORVASC) 2.5 mg tablet Take 2.5 mg by mouth daily.       pantoprazole (PROTONIX) 40 mg tablet Take 40 mg by mouth daily. ezetimibe-simvastatin (VYTORIN 10/10) 10-10 mg per tablet Take 1 Tab by mouth nightly.      magnesium oxide (MAG-OX) 400 mg tablet Take 400 mg by mouth daily. omega-3 fatty acids-vitamin e (FISH OIL) 1,000 mg cap Take 1 Cap by mouth daily. potassium chloride SR (KLOR-CON 10) 10 mEq tablet Take 10 mEq by mouth daily. My Recommended Diet, Activity, Wound Care, and follow-up labs are listed in the patient's Discharge Insturctions which I have personally completed and reviewed.     _______________________________________________________________________  DISPOSITION:    Home with Family: x   Home with HH/PT/OT/RN: x   SNF/LTC:    ALBERT:    OTHER:        Condition at Discharge:  Stable  _______________________________________________________________________  Follow up with:   PCP : Fadi Odom MD  Follow-up Information     Follow up With Details [address]lejo MD   [address] [facility]  [phone]                Total time in minutes spent coordinating this discharge (includes going over instructions, follow-up, prescriptions, and preparing report for sign off to her PCP) :  26 minutes    Signed:  Devonte Yanez MD

## 2017-10-20 NOTE — PROGRESS NOTES
Occupational Therapy EVALUATION/discharge  Patient: Erika Madison (55 y.o. female)  Date: 10/20/2017  Primary Diagnosis: Ascites        Precautions:   Fall    ASSESSMENT:   Based on the objective data described below, the patient presents with generalized weakness, decreased endurance, strength but at her baseline with ADLs and decreased functional mobility. Pt was living at home alone and independent in all areas and has family that is near by that can check on her as needed. Pt was cleared to be seen for tx and all VSS. Pt was in the bathroom and was independent with her toilet transfers, transfers to bed, recliner and with setup she is able to bathe and dress self. Pt had family present and very supportive family. Pt has BUE functional strength and range. Pt has no OT goals and will be discharged from OT services and return home with home care PT. Further skilled acute occupational therapy is not indicated at this time. Discharge Recommendations: Home Health PT  Further Equipment Recommendations for Discharge: tbd      SUBJECTIVE:   Patient stated I have been blessed all my life. Not vanessa ,but blessed.     OBJECTIVE DATA SUMMARY:   HISTORY:   Past Medical History:   Diagnosis Date    Autoimmune disease ([address]     Chronic kidney disease     GERD (gastroesophageal reflux disease)     Hypertension     Murmur      Past Surgical History:   Procedure Laterality Date    HX BREAST BIOPSY Left     3 yrs ago----neg    HX HYSTERECTOMY      HX OTHER SURGICAL      Parathyroidectomy        Prior Level of Function/Home Situation: pt lives alone and was independent in all areas and was not using AD for transfers or ambulation.   Expanded or extensive additional review of patient history:     Home Situation  Home Environment: Private residence  # Steps to Enter: 5  Rails to Enter: Yes  Hand Rails : Bilateral  Wheelchair Ramp: No  One/Two Story Residence: One story  Living Alone: Yes  Support Systems: Family member(s), Friends \ neighbors  Patient Expects to be Discharged to[de-identified] Private residence  Current DME Used/Available at Home: Cane, straight, Walker, rolling, Grab bars  Tub or Shower Type: Shower  [x]  Right hand dominant   []  Left hand dominant    EXAMINATION OF PERFORMANCE DEFICITS:  Cognitive/Behavioral Status:  Neurologic State: Alert  Orientation Level: Oriented X4  Cognition: Appropriate decision making; Appropriate for age attention/concentration; Appropriate safety awareness  Perception: Appears intact  Perseveration: No perseveration noted  Safety/Judgement: Awareness of environment    Skin: in good health     Edema: minimal swelling in B LE    Hearing: Auditory  Auditory Impairment: None    Vision/Perceptual:                                     Range of Motion:    AROM: Generally decreased, functional                         Strength:    Strength: Generally decreased, functional                Coordination:  Coordination: Within functional limits  Fine Motor Skills-Upper: Left Intact; Right Intact    Gross Motor Skills-Upper: Left Intact; Right Intact    Tone & Sensation:    Tone: Normal  Sensation: Intact                      Balance:  Sitting: Intact  Standing: Impaired; Without support  Standing - Static: Good  Standing - Dynamic : Fair    Functional Mobility and Transfers for ADLs:  Bed Mobility:  Scooting: Independent    Transfers:  Sit to Stand: Independent (in the bathroom)  Stand to Sit: Supervision  Bed to Chair: Supervision  Toilet Transfer : Supervision    ADL Assessment:  Feeding: Independent    Oral Facial Hygiene/Grooming: Setup    Bathing: Setup    Upper Body Dressing: Independent    Lower Body Dressing: Independent    Toileting: Supervision          Barthel Index:    Bathin  Bladder: 10  Bowels: 10  Groomin  Dressing: 10  Feeding: 10  Mobility: 10  Stairs: 5  Toilet Use: 10  Transfer (Bed to Chair and Back): 10  Total: 80       Barthel and G-code impairment scale:  Percentage of impairment CH  0% CI  1-19% CJ  20-39% CK  40-59% CL  60-79% CM  80-99% CN  100%   Barthel Score 0-100 100 99-80 79-60 59-40 20-39 1-19   0   Barthel Score 0-20 20 17-19 13-16 9-12 5-8 1-4 0      The Barthel ADL Index: Guidelines  1. The index should be used as a record of what a patient does, not as a record of what a patient could do. 2. The main aim is to establish degree of independence from any help, physical or verbal, however minor and for whatever reason. 3. The need for supervision renders the patient not independent. 4. A patient's performance should be established using the best available evidence. Asking the patient, friends/relatives and nurses are the usual sources, but direct observation and common sense are also important. However direct testing is not needed. 5. Usually the patient's performance over the preceding 24-48 hours is important, but occasionally longer periods will be relevant. 6. Middle categories imply that the patient supplies over 50 per cent of the effort. 7. Use of aids to be independent is allowed. Flor Zamora., Barthel, D.W. (0089). Functional evaluation: the Barthel Index. [address] [facility] (14)2. Carey You cecilia Jesus, J.J.M.JERRY, Katia Kraus., [facility], [address] [place] [address] (1999). Measuring the change indisability after inpatient rehabilitation; comparison of the responsiveness of the Barthel Index and Functional [place] Measure. Journal of Neurology, Neurosurgery, and Psychiatry, 66(4), 728-847. Anum Blackman, N.J.A, LETICIA Cabral, & Luz Burger MSHANE. (2004.) Assessment of post-stroke quality of life in cost-effectiveness studies: The usefulness of the Barthel Index and the EuroQoL-5D. Quality of Life Research, 13, 193-52                      Cognitive Retraining  Safety/Judgement: Awareness of environment      Functional Measure:      G codes:   In compliance with CMSs Claims Based Outcome Reporting, the following G-code set was chosen for this patient based on their primary functional limitation being treated: The outcome measure chosen to determine the severity of the functional limitation was the Barthel with a score of 80/100 which was correlated with the impairment scale. ? Self Care:     - CURRENT STATUS: CI - 1%-19% impaired, limited or restricted    - GOAL STATUS: CI - 1%-19% impaired, limited or restricted    - D/C STATUS:  CI - 1%-19% impaired, limited or restricted       Occupational Therapy Evaluation Charge Determination   History Examination Decision-Making   HIGH Complexity : Extensive review of history including physical, cognitive and psychosocial history  LOW Complexity : 1-3 performance deficits relating to physical, cognitive , or psychosocial skils that result in activity limitations and / or participation restrictions  LOW Complexity : No comorbidities that affect functional and no verbal or physical assistance needed to complete eval tasks       Based on the above components, the patient evaluation is determined to be of the following complexity level: LOW   Pain:Pain Scale 1: Numeric (0 - 10)  Pain Intensity 1: 0              Activity Tolerance:   vss  Please refer to the flowsheet for vital signs taken during this treatment. After treatment:   [x]  Patient left in no apparent distress sitting up in chair  []  Patient left in no apparent distress in bed  [x]  Call bell left within reach  [x]  Nursing notified  [x]  Caregiver present  []  Bed alarm activated    COMMUNICATION/EDUCATION:   Communication/Collaboration:  [x]      Home safety education was provided and the patient/caregiver indicated understanding. [x]      Patient/family have participated as able and agree with findings and recommendations. []      Patient is unable to participate in plan of care at this time.   Findings and recommendations were discussed with: Physical Therapist and Registered Nurse and family    Khanh Donnelly OT  Time Calculation: 16 mins

## 2017-10-20 NOTE — PROGRESS NOTES
GI Progress Note Rom Taylor)  NAME:Dara Merino :1927 LQE:[id number]   ATTG: Dr. Lesvia Garcias   PCP: Sloane Mensah MD  Date/Time:  10/20/2017 8:22 AM   Assessment:   · Malignant ascites, adenocarcinoma, suspected gyn source ? Plan:   · If cytology or gyn eval unrevealing, will plan for EGD/Colonoscopy  · Will sign off for now, but I'd be glad to come by for any reason in the future so please don't hesitate to call      Subjective:   Cytology back, below. No major issues. Feels okay. Cyto:  \"   Immunohistochemistry stains with antibodies directed against CK 7, CK 20, CDX2, TTF-1, calretinin, ER, PAX8 and GATA3 show tumor cells are positive for CK7 and negative for the remaining stains. This immunophenotypic staining pattern is nonspecific but does not exclude poorly differentiated tumors of the breast, ovary, endometrium, or pancreaticobiliary tract. \"    Review of Systems:  Symptom Y/N Comments  Symptom Y/N Comments   Fever/Chills n   Chest Pain n    Cough n   Headaches n    Sputum n   Joint Pain n    SOB/KING n   Pruritis/Rash n    Tolerating Diet y   Other       Could NOT obtain due to:      Objective:   VITALS:   Last 24hrs VS reviewed since prior progress note. Most recent are:  Visit Vitals    /52 (BP 1 Location: Left arm, BP Patient Position: Sitting)    Pulse 85    Temp 98.2 °F (36.8 °C)    Resp 18    Ht 5' 3\" (1.6 m)    Wt 88.3 kg (194 lb 10.7 oz)    SpO2 91%    BMI 34.48 kg/m2       Intake/Output Summary (Last 24 hours) at 10/20/17 0816  Last data filed at 10/19/17 0843   Gross per 24 hour   Intake              480 ml   Output                0 ml   Net              480 ml     PHYSICAL EXAM:  General: WD, WN. Alert, cooperative, no acute distress    HEENT: NC, Atraumatic. Anicteric sclerae. Lungs:  CTA Bilaterally. No Wheezing/Rhonchi/Rales.   Heart:  Regular  rhythm,  No murmur (), No Rubs, No Gallops  Abdomen: Soft, midly distended, Non tender.  +Bowel sounds, no HSM  Neurologic:  Alert and oriented X 3. No acute neurological distress   Psych:   Good insight. Not anxious nor agitated. Lab and Radiology Data Reviewed: (see below)    Medications Reviewed: (see below)  PMH/SH reviewed - no change compared to H&P  ________________________________________________________________________  Total time spent with patient: 15 minutes ________________________________________________________________________  Care Plan discussed with:  Patient y   Family  y   RN y              Consultant:       Priti Hatch MD     Procedures: see electronic medical records for all procedures/Xrays and details which were not copied into this note but were reviewed prior to creation of Plan. LABS:  Recent Labs      10/19/17   0607   WBC  6.5   HGB  11.1*   HCT  34.5*   PLT  373     Recent Labs      10/19/17   0607   NA  137   K  3.8   CL  101   CO2  27   BUN  17   CREA  1.20*   GLU  98   CA  8.3*     No results for input(s): SGOT, GPT, AP, TBIL, TP, ALB, GLOB, GGT, AML, LPSE in the last 72 hours. No lab exists for component: AMYP, HLPSE  No results for input(s): INR, PTP, APTT in the last 72 hours. No lab exists for component: INREXT, INREXT   No results for input(s): FE, TIBC, PSAT, FERR in the last 72 hours. No results found for: FOL, RBCF  No results for input(s): PH, PCO2, PO2 in the last 72 hours. No results for input(s): CPK, CKMB in the last 72 hours.     No lab exists for component: TROPONINI  Lab Results   Component Value Date/Time    Color DARK YELLOW 10/15/2017 02:01 PM    Appearance CLOUDY 10/15/2017 02:01 PM    Specific gravity 1.026 10/15/2017 02:01 PM    pH (UA) 6.0 10/15/2017 02:01 PM    Protein TRACE 10/15/2017 02:01 PM    Glucose NEGATIVE  10/15/2017 02:01 PM    Ketone NEGATIVE  10/15/2017 02:01 PM    Bilirubin NEGATIVE  02/21/2016 03:10 PM    Urobilinogen 1.0 10/15/2017 02:01 PM    Nitrites NEGATIVE  10/15/2017 02:01 PM    Leukocyte Esterase NEGATIVE  10/15/2017 02:01 PM    Epithelial cells MODERATE 10/15/2017 02:01 PM    Bacteria 1+ 10/15/2017 02:01 PM    WBC 0-4 10/15/2017 02:01 PM    RBC 0-5 10/15/2017 02:01 PM       MEDICATIONS:  Current Facility-Administered Medications   Medication Dose Route Frequency    enoxaparin (LOVENOX) injection 30 mg  30 mg SubCUTAneous Q24H    lactobac ac& pc-s.therm-b.anim (ZARI Q/RISAQUAD)  1 Cap Oral DAILY    polyethylene glycol (MIRALAX) packet 17 g  17 g Oral DAILY    influenza vaccine 2017-18 (3 yrs+)(PF) (FLUZONE QUAD/FLUARIX QUAD) injection 0.5 mL  0.5 mL IntraMUSCular PRIOR TO DISCHARGE    furosemide (LASIX) tablet 40 mg  40 mg Oral DAILY    guaiFENesin (ROBITUSSIN) 100 mg/5 mL oral liquid 400 mg  400 mg Oral TID    sodium chloride (NS) flush 5-10 mL  5-10 mL IntraVENous Q8H    sodium chloride (NS) flush 5-10 mL  5-10 mL IntraVENous PRN    amLODIPine (NORVASC) tablet 2.5 mg  2.5 mg Oral DAILY    pantoprazole (PROTONIX) tablet 40 mg  40 mg Oral DAILY    sodium chloride (NS) flush 5-10 mL  5-10 mL IntraVENous Q8H    sodium chloride (NS) flush 5-10 mL  5-10 mL IntraVENous PRN    acetaminophen (TYLENOL) tablet 650 mg  650 mg Oral Q4H PRN    oxyCODONE-acetaminophen (PERCOCET) 5-325 mg per tablet 1 Tab  1 Tab Oral Q4H PRN    naloxone (NARCAN) injection 0.4 mg  0.4 mg IntraVENous PRN    ondansetron (ZOFRAN) injection 4 mg  4 mg IntraVENous Q4H PRN

## 2017-10-20 NOTE — PROGRESS NOTES
Problem: Mobility Impaired (Adult and Pediatric)  Goal: *Acute Goals and Plan of Care (Insert Text)  Physical Therapy Goals  Initiated 10/20/2017  1. Patient will move from supine to sit and sit to supine , scoot up and down and roll side to side in bed with independence within 7 day(s). 2.  Patient will transfer from bed to chair and chair to bed with independence using the least restrictive device within 7 day(s). 3.  Patient will perform sit to stand with independence within 7 day(s). 4.  Patient will ambulate with modified independence for 150 feet with the least restrictive device within 7 day(s). 5.  Patient will ascend/descend 5 stairs with 1 handrail(s) with modified independence within 7 day(s). physical Therapy EVALUATION  Patient: Jaxon Negro (7 y.o. female)  Date: 10/20/2017  Primary Diagnosis: Ascites        Precautions:  Fall    ASSESSMENT :  Based on the objective data described below, the patient presents with impaired functional mobility secondary to impaired standing balance, generalized weakness, limited AROM, and mild gait impairments following admission for ascites. Pt received standing in the bathroom and agreeable to PT evaluation. Pt cleared by nursing for mobility. She demonstrates good-fair standing balance throughout mobility this date. She ambulated 160' with SBA using SPC on R and holding therapist's hand on L. Demonstrates mildly unsteady gait with path deviations, increased trunk sway, slow gait speed, and widened CHANTE throughout gait. No LOB noted during mobility. She was assisted into bedside chair following therapy session and left with all needs met, family present, and RN aware. Educated pt on using RW once home for safety until she gets stronger; pt verbalized understanding. Recommend pt return home with use of RW, HHPT, and initial 24/7 supervision at discharge. PT will continue to follow patient while she is the hospital to address strength and gait impairments. Recommend patient ambulate at least 1x/day in hallways while in the hospital with nursing assist.    Patient will benefit from skilled intervention to address the above impairments. Patients rehabilitation potential is considered to be Good  Factors which may influence rehabilitation potential include:   []         None noted  []         Mental ability/status  [x]         Medical condition  []         Home/family situation and support systems  []         Safety awareness  []         Pain tolerance/management  []         Other:      PLAN :  Recommendations and Planned Interventions:  [x]           Bed Mobility Training             []    Neuromuscular Re-Education  [x]           Transfer Training                   []    Orthotic/Prosthetic Training  [x]           Gait Training                         []    Modalities  [x]           Therapeutic Exercises           []    Edema Management/Control  [x]           Therapeutic Activities            [x]    Patient and Family Training/Education  []           Other (comment):    Frequency/Duration: Patient will be followed by physical therapy  4 times a week to address goals. Discharge Recommendations: Home Health with initial 24/7 supervision  Further Equipment Recommendations for Discharge: None - pt owns [facility] and      SUBJECTIVE:   Patient stated You probably know where I live.     OBJECTIVE DATA SUMMARY:   HISTORY:    Past Medical History:   Diagnosis Date    Autoimmune disease ([facility] [address]     Chronic kidney disease     GERD (gastroesophageal reflux disease)     Hypertension     Murmur      Past Surgical History:   Procedure Laterality Date    HX BREAST BIOPSY Left     3 yrs ago----neg    HX HYSTERECTOMY      HX OTHER SURGICAL      Parathyroidectomy      Prior Level of Function/Home Situation: Pt is mod I to independent for mobility and ADLs at baseline. Lives alone. Has good support system in the area. Does have h/o falls.  Uses SPC in her home and in the community. Personal factors and/or comorbidities impacting plan of care: CKD    Home Situation  Home Environment: Private residence  # Steps to Enter: 5  Rails to Enter: Yes  Hand Rails : Bilateral  Wheelchair Ramp: No  One/Two Story Residence: One story  Living Alone: Yes  Support Systems: Family member(s), Friends \ neighbors  Patient Expects to be Discharged to[de-identified] Private residence  Current DME Used/Available at Home: Baron Radha, straight, Walker, rolling, Grab bars  Tub or Shower Type: Shower    EXAMINATION/PRESENTATION/DECISION MAKING:   Critical Behavior:  Neurologic State: Alert  Orientation Level: Oriented X4  Cognition: Appropriate decision making, Appropriate for age attention/concentration, Appropriate safety awareness     Hearing: Auditory  Auditory Impairment: None  Skin:  Intact  Edema: None  Range Of Motion:  AROM: Generally decreased, functional                       Strength:    Strength: Generally decreased, functional                    Tone & Sensation:   Tone: Normal              Sensation: Intact               Coordination:  Coordination: Within functional limits  Vision:      Functional Mobility:  Bed Mobility:           Scooting: Independent  Transfers:  Sit to Stand: Independent (in the bathroom)  Stand to Sit: Supervision                       Balance:   Sitting: Intact  Standing: Impaired; Without support  Standing - Static: Good  Standing - Dynamic : Fair  Ambulation/Gait Training:  Distance (ft): 160 Feet (ft)  Assistive Device: Gait belt;Cane, straight (SPC on R, HHA x 1 on L)  Ambulation - Level of Assistance: Stand-by asssistance; Additional time; Adaptive equipment     Gait Description (WDL): Exceptions to WDL  Gait Abnormalities: Decreased step clearance;Trunk sway increased; Path deviations        Base of Support: Widened     Speed/Ila: Pace decreased (<100 feet/min); Shuffled  Step Length: Left shortened;Right shortened      Functional Measure:  Barthel Index:    Bathin  Bladder: 10  Bowels: 10  Groomin  Dressing: 10  Feeding: 10  Mobility: 10  Stairs: 5  Toilet Use: 10  Transfer (Bed to Chair and Back): 10  Total: 80       Barthel and G-code impairment scale:  Percentage of impairment CH  0% CI  1-19% CJ  20-39% CK  40-59% CL  60-79% CM  80-99% CN  100%   Barthel Score 0-100 100 99-80 79-60 59-40 20-39 1-19   0   Barthel Score 0-20 20 17-19 13-16 9-12 5-8 1-4 0      The Barthel ADL Index: Guidelines  1. The index should be used as a record of what a patient does, not as a record of what a patient could do. 2. The main aim is to establish degree of independence from any help, physical or verbal, however minor and for whatever reason. 3. The need for supervision renders the patient not independent. 4. A patient's performance should be established using the best available evidence. Asking the patient, friends/relatives and nurses are the usual sources, but direct observation and common sense are also important. However direct testing is not needed. 5. Usually the patient's performance over the preceding 24-48 hours is important, but occasionally longer periods will be relevant. 6. Middle categories imply that the patient supplies over 50 per cent of the effort. 7. Use of aids to be independent is allowed. Bayron Walden., Barthel, D.W. (1119). Functional evaluation: the Barthel Index. [facility] (14)2. Sara Murillo cecilia Jesus, CORDELL.J.MANAS, Gary Del Rosario., Mildred Us., [place], [address] [place] [address] (). Measuring the change indisability after inpatient rehabilitation; comparison of the responsiveness of the Barthel Index and Functional [place] Measure. Journal of Neurology, Neurosurgery, and Psychiatry, 66(4), 378-698. [place] Suarez, N.J.A, Ember Reyes  [email or web address].WAI, & Kaya Brown M.A. (2004.) Assessment of post-stroke quality of life in cost-effectiveness studies: The usefulness of the Barthel Index and the EuroQoL-5D. Quality of Life Research, 13, 253-12       G codes:   In compliance with CMSs Claims Based Outcome Reporting, the following G-code set was chosen for this patient based on their primary functional limitation being treated: The outcome measure chosen to determine the severity of the functional limitation was the Barthel index with a score of 80/100 which was correlated with the impairment scale. ? Mobility - Walking and Moving Around:     - CURRENT STATUS: CI - 1%-19% impaired, limited or restricted    - GOAL STATUS: CH - 0% impaired, limited or restricted    - D/C STATUS:  ---------------To be determined---------------      Physical Therapy Evaluation Charge Determination   History Examination Presentation Decision-Making   MEDIUM  Complexity : 1-2 comorbidities / personal factors will impact the outcome/ POC  HIGH Complexity : 4+ Standardized tests and measures addressing body structure, function, activity limitation and / or participation in recreation  MEDIUM Complexity : Evolving with changing characteristics  Other outcome measures Barthel Index  MEDIUM      Based on the above components, the patient evaluation is determined to be of the following complexity level: MEDIUM    Pain:  Pain Scale 1: Numeric (0 - 10)  Pain Intensity 1: 0              Activity Tolerance:   Good - pt without complaints  Please refer to the flowsheet for vital signs taken during this treatment. After treatment:   [x]         Patient left in no apparent distress sitting up in chair  []         Patient left in no apparent distress in bed  [x]         Call bell left within reach  [x]         Nursing notified  [x]         Caregiver present  []         Bed alarm activated    COMMUNICATION/EDUCATION:   The patients plan of care was discussed with: Physical Therapist, Occupational Therapist and Registered Nurse. [x]         Fall prevention education was provided and the patient/caregiver indicated understanding.   [x]         Patient/family have participated as able in goal setting and plan of care.  [x]         Patient/family agree to work toward stated goals and plan of care. []         Patient understands intent and goals of therapy, but is neutral about his/her participation. []         Patient is unable to participate in goal setting and plan of care.     Thank you for this referral.  Almas Lenz, PT, DPT   Time Calculation: 17 mins

## 2017-10-20 NOTE — PROGRESS NOTES
Hematology-Oncology Progress Note      Freddie Cerrato  8/16/1927  [id number]      10/20/2017  1:28 PM    Follow-up for: Ascites , peritoneal carcinomatosis   [x] Chart notes since last visit reviewed   [x] Medications reviewed for allergies and interactions    Case discussed with the following:   []               [x] Nursing Staff                                                                   [] Pathologist    Subjective:     Spoke with patient who complains of: No loose watery BMs, no fevers, no abdo pain, no melena or BRBPR  Abdomen is  feeling more bloated+ again, but no sob. Objective:     Patient Vitals for the past 24 hrs:   BP Temp Pulse Resp SpO2   10/20/17 0800 132/52 98.2 °F (36.8 °C) 85 18 91 %   10/20/17 0030 119/57 98.3 °F (36.8 °C) 87 16 93 %       PHYSICAL EXAMINATION   VITAL SIGNS: She is afebrile. T-max is 98.5, pulse is 78 per minute,   respirations are 18-20 per minute, and blood pressure is 127/55,   oxygen saturations are 92%. She has been intermittently on 2 liters of   nasal oxygen. HEENT: Oral mucosa was moist.   GENERAL: Elderly lady, not in acute distress, lying comfortably in bed. NECK: Supple. LYMPHATIC: Showed no evidence of lymphadenopathy in the   cervical, supraclavicular, axillary, or inguinal areas. Breast Exam- negative  LUNGS: Decreased breath sounds at both bases, more so on the left   than the right. ABDOMEN: less Distended, doughy abdomen, feeling of nodularity along   the entire abdominal wall/and peritoneum. She has some amount of   shifting dullness as well. Bowel sounds are present but reduced. Cannot palpate any obvious organomegaly. EXTREMITIES: 1+ edema around both ankles ( better). Casey calf tenderness- new  NEUROLOGIC: No focal neurologic deficits.       Available labs reviewed:      Pathology- ascitic fluid cytology - shows metastatic adenocarcinoma - but immunostains were non specific, did not identify the tissue of origin with a wide differential.    Time spent with patient and/or family in counseling:      Assessment and Plan   Functional 80 yr old female    *)New Onset ascites  *)Radiologic picture of abdominal carcinomatosis  *)High , CA 27-29       A 59-year-old  woman with   history of hypertension, mild chronic kidney disease, but otherwise   healthy nonsmoker, presents with 2-3 week history of decreased   appetite, cough, shortness of breath, abdominal distention, pencil-thin   stools with radiologic picture on CT abdomen, as described above, is   highly suspicious for an abdominal carcinomatosis-type process with   new onset of ascites.      Her CEA is low at 0.8 ,but ca 19-9 is high at 299.? Breast cancer as primary source? Would be a very unusual presentation . Lyndsay Mammogram done- negative  Now with  of 862- could be gyn/ovarian primary. D/W Dr. Galileo Mcdaniel from [facility] oncology- for consult. Pelvic transvag usg was unrevealing. ? Occult ovarian or fallopian tube primary vs PPC. CT chest does not show anyything new or pulmonary mets/masses or lymphadenopathy  Ascitic fluid cytology as above. New diarrhea - resolved. Doppler-venoug lyndsay LE for DVT  DVT prophylaxis- 30 mg sc once daily  Appreciate Dr. Chilango May input. Physical therapy    10/20/2017 - Had a long d/w Dr. Bethany Pickett. This could be an ovarian cancer, but still no definite pathologic confirmation. She felt that it would be more prudent to get pathologic confirmation with a percutaneous omental nodule biopsy for tissue of origin, before giving her cytotoxic chemotherapy. This was narrated to the patient and her gransdon , Dr. Yvette Edmonds. They are in agreement with this plan and are agreeable to the biopsy. I discussed the case with Dr. Javi Jean Baptiste from Interventional radiology , who kindly reviewed her CT scan with me and felt that it would be possible to attempt a percutaneous biopsy of the nodules.    Since this will only be done on Monday at the earliest, it would be ok to d/c Mrs Abdoul Santiago home after she meets with Dr. Sharon Esquivel later today, after reviewing her doppler LE usg . If she has a DVT, would  D/C on Eliquis. Discussed the potentail need for a port placement with them. They will think about it and d/w Dr. Sharon Esquivel. If they desire a port, this can be ordered to be done at the same time as the other procedures in IR next week. Will order an outpatient biopsy of omental nodule with a repeat paracentesis in IR for early next week . Greatly appreciate 's help as well as Dr. Bhavesh Vargas and Dr. Pita Rosario help.

## 2017-10-20 NOTE — PROGRESS NOTES
Progress Note    Patient: Austin Cochran MRN: [id number]  SSN: [id number]    YOB: 1927  Age: 80 y.o. Sex: female      Admit Date: 10/15/2017    LOS: 5 days     Subjective:     Feeling ok today. Discussed paracentesis results with patient, son, and grandson. Objective:     Vitals:    10/19/17 1150 10/20/17 0030 10/20/17 0800 10/20/17 1410   BP: 127/73 119/57 132/52    Pulse: 62 87 85    Resp: 18 16 18    Temp: 98.4 °F (36.9 °C) 98.3 °F (36.8 °C) 98.2 °F (36.8 °C)    SpO2: 96% 93% 91%    Weight:    83.1 kg (183 lb 3.2 oz)   Height:            Intake and Output:  Current Shift: 10/20 0701 - 10/20 1900  In: 240 [P.O.:240]  Out: -   Last three shifts: 10/18 1901 - 10/20 0700  In: 480 [P.O.:480]  Out: -     Physical Exam:   GENERAL: alert, cooperative, no distress  ABDOMEN: soft, distended, no rebound or guarding  EXTREMITIES:  extremities normal, atraumatic, no cyanosis or edema  SKIN: Normal.  NEUROLOGIC: negative    Lab/Data Review: All lab results for the last 24 hours reviewed. Cytology of paracentesis consistent with metastatic adenocarcinoma - breast, gyn, or pancreaticobiliary. Assessment:     Active Problems:    Ascites (10/15/2017)        Plan:   81 yo with carcinomatosis, ascites and elevated  and CA27-29.   -Findings are concerning for ovarian cancer, however paracentesis cytology is not confirmative. -Recommend omental biopsy for confirmation and repeat therapeutic paracentesis for symptomatic management - both can be done as outpatient next week. -Assuming the diagnosis confirms ovarian cancer, I discussed management of ovarian cancer and recommend neoadjuvant chemotherapy with carboplatin AUC 5 which is typically well tolerated. Could consider adding a taxane depending on her tolerability. Discussed curative-intent versus palliative-intent approach with patient and her family.  We will proceed with palliative chemotherapy, and will adjust plan accordingly during treatment.    -Will need port a cath for chemo, at least by cycle#2.  -F/u in the office on Thursday 11/2 and plan for possible treatment that day with carboplatin AUC5. Appt is being arranged.      Signed By: Leann Funk MD     October 20, 2017      [facility]ologic [facility]  [phone]

## 2017-10-21 NOTE — PROGRESS NOTES
Patient discharged home with family at bedside. Discharge instructions, medications, and follow up appointments discussed.  All questions answered

## 2017-10-23 ENCOUNTER — TELEPHONE (OUTPATIENT)
Dept: CASE MANAGEMENT | Age: 82
End: 2017-10-23

## 2017-10-23 ENCOUNTER — TELEPHONE (OUTPATIENT)
Dept: ONCOLOGY | Age: 82
End: 2017-10-23

## 2017-10-23 NOTE — TELEPHONE ENCOUNTER
ONCOLOGY NURSE NAVIGATOR     Spoke w/ Allie HERNANDEZ [facility] inpt order cancelled when pt discharged on Friday. Outpt order to be entered. States spoke to [facility] staff, pt able to have all 3 procedures completed Wed. Returned TC to pt's Stephanie sheikh,  to verify appointment time for paracentesis, omentum bx, and PAC placement. 10/25/17 9am, report to [facility] 1 Registration. TC to Dr. Joan Jain nurse w/ update. TC to pt to confirm.     Total Time Spent 30 minutes     Santos Bai RN Oncology Nurse Navigator

## 2017-10-23 NOTE — TELEPHONE ENCOUNTER
ONCOLOGY NURSE NAVIGATOR        TC to Dr. Mario Butts office [phone], message left on nurse KRYSTINA in regards to order for [facility] placement. MD wrote in [facility] order \"coordinate with outpatient omental bx and paracentesis next week\". Requested order be placed for above. Since findings are not confirmative per MD, questioned whether PAC placement be delayed until results of omental bx received. Plan is for palliative neoadjuvant chemotherapy with Carboplatin. Follow up in office 11/2. TC to Rojelio Nunez in scheduling, VM left. TC to pt to advise of above.      Total Time Spent 30 minutes    Feli Velasco RN

## 2017-10-24 NOTE — TELEPHONE ENCOUNTER
ONCOLOGY NAVIGATOR NURSE    TC to Dr. Karen Kc office to inform of recent hospitalization, omentum bx/paracentisis 10/26/17 [facility]. PAC placement 10/27/17.   Appt Dr. Yana Wayne 11/2

## 2017-10-25 NOTE — PROGRESS NOTES
Pre call completed with patient regarding upcoming procedure. Patient states her last dose of Aspirin was 10/24/2017.

## 2017-10-26 ENCOUNTER — HOSPITAL ENCOUNTER (OUTPATIENT)
Dept: CT IMAGING | Age: 82
Discharge: HOME | End: 2017-10-26
Attending: INTERNAL MEDICINE
Payer: MEDICARE

## 2017-10-26 ENCOUNTER — HOSPITAL ENCOUNTER (OUTPATIENT)
Dept: INTERVENTIONAL RADIOLOGY/VASCULAR | Age: 82
Discharge: HOME | End: 2017-10-26
Attending: INTERNAL MEDICINE
Payer: MEDICARE

## 2017-10-26 VITALS
RESPIRATION RATE: 16 BRPM | WEIGHT: 180 LBS | OXYGEN SATURATION: 95 % | HEIGHT: 62 IN | TEMPERATURE: 98 F | SYSTOLIC BLOOD PRESSURE: 118 MMHG | BODY MASS INDEX: 33.13 KG/M2 | DIASTOLIC BLOOD PRESSURE: 55 MMHG | HEART RATE: 86 BPM

## 2017-10-26 DIAGNOSIS — R18.0 ASCITES, MALIGNANT: ICD-10-CM

## 2017-10-26 DIAGNOSIS — C78.6 PERITONEAL CARCINOMATOSIS (HCC): ICD-10-CM

## 2017-10-26 PROCEDURE — 74011250636 HC RX REV CODE- 250/636

## 2017-10-26 PROCEDURE — 77030018781

## 2017-10-26 PROCEDURE — 74011250636 HC RX REV CODE- 250/636: Performed by: RADIOLOGY

## 2017-10-26 PROCEDURE — 88173 CYTOPATH EVAL FNA REPORT: CPT | Performed by: RADIOLOGY

## 2017-10-26 PROCEDURE — C1788 PORT, INDWELLING, IMP: HCPCS

## 2017-10-26 PROCEDURE — 88360 TUMOR IMMUNOHISTOCHEM/MANUAL: CPT | Performed by: RADIOLOGY

## 2017-10-26 PROCEDURE — 49083 ABD PARACENTESIS W/IMAGING: CPT

## 2017-10-26 PROCEDURE — C1729 CATH, DRAINAGE: HCPCS

## 2017-10-26 PROCEDURE — C1892 INTRO/SHEATH,FIXED,PEEL-AWAY: HCPCS

## 2017-10-26 PROCEDURE — 88341 IMHCHEM/IMCYTCHM EA ADD ANTB: CPT | Performed by: RADIOLOGY

## 2017-10-26 PROCEDURE — 77030010507 HC ADH SKN DERMBND J&J -B

## 2017-10-26 PROCEDURE — 77030002996 HC SUT SLK J&J -A

## 2017-10-26 PROCEDURE — 49180 BIOPSY ABDOMINAL MASS: CPT

## 2017-10-26 PROCEDURE — C1769 GUIDE WIRE: HCPCS

## 2017-10-26 PROCEDURE — 77030031139 HC SUT VCRL2 J&J -A

## 2017-10-26 PROCEDURE — 77001 FLUOROGUIDE FOR VEIN DEVICE: CPT

## 2017-10-26 PROCEDURE — 77030003462 HC NDL BIOP BRST MDT -B

## 2017-10-26 PROCEDURE — 77030020851 HC CAUT BTRY HI AARM -A

## 2017-10-26 PROCEDURE — 74011636320 HC RX REV CODE- 636/320: Performed by: RADIOLOGY

## 2017-10-26 PROCEDURE — 88342 IMHCHEM/IMCYTCHM 1ST ANTB: CPT | Performed by: RADIOLOGY

## 2017-10-26 PROCEDURE — 88305 TISSUE EXAM BY PATHOLOGIST: CPT | Performed by: RADIOLOGY

## 2017-10-26 PROCEDURE — 74011000250 HC RX REV CODE- 250: Performed by: RADIOLOGY

## 2017-10-26 PROCEDURE — 88333 PATH CONSLTJ SURG CYTO XM 1: CPT | Performed by: RADIOLOGY

## 2017-10-26 RX ORDER — CEFAZOLIN SODIUM IN 0.9 % NACL 2 G/100 ML
2 PLASTIC BAG, INJECTION (ML) INTRAVENOUS ONCE
Status: COMPLETED | OUTPATIENT
Start: 2017-10-26 | End: 2017-10-26

## 2017-10-26 RX ORDER — HEPARIN SODIUM 200 [USP'U]/100ML
200 INJECTION, SOLUTION INTRAVENOUS ONCE
Status: COMPLETED | OUTPATIENT
Start: 2017-10-26 | End: 2017-10-26

## 2017-10-26 RX ORDER — MIDAZOLAM HYDROCHLORIDE 1 MG/ML
INJECTION, SOLUTION INTRAMUSCULAR; INTRAVENOUS
Status: COMPLETED
Start: 2017-10-26 | End: 2017-10-26

## 2017-10-26 RX ORDER — SODIUM CHLORIDE 9 MG/ML
25 INJECTION, SOLUTION INTRAVENOUS CONTINUOUS
Status: DISCONTINUED | OUTPATIENT
Start: 2017-10-26 | End: 2017-10-30 | Stop reason: HOSPADM

## 2017-10-26 RX ORDER — HEPARIN 100 UNIT/ML
500 SYRINGE INTRAVENOUS ONCE
Status: COMPLETED | OUTPATIENT
Start: 2017-10-26 | End: 2017-10-26

## 2017-10-26 RX ORDER — LIDOCAINE HYDROCHLORIDE 10 MG/ML
INJECTION, SOLUTION EPIDURAL; INFILTRATION; INTRACAUDAL; PERINEURAL
Status: DISPENSED
Start: 2017-10-26 | End: 2017-10-26

## 2017-10-26 RX ORDER — ONDANSETRON 2 MG/ML
4 INJECTION INTRAMUSCULAR; INTRAVENOUS ONCE
Status: COMPLETED | OUTPATIENT
Start: 2017-10-26 | End: 2017-10-26

## 2017-10-26 RX ORDER — LIDOCAINE HYDROCHLORIDE 20 MG/ML
20 INJECTION, SOLUTION INFILTRATION; PERINEURAL ONCE
Status: COMPLETED | OUTPATIENT
Start: 2017-10-26 | End: 2017-10-26

## 2017-10-26 RX ORDER — MIDAZOLAM HYDROCHLORIDE 1 MG/ML
5 INJECTION, SOLUTION INTRAMUSCULAR; INTRAVENOUS
Status: DISCONTINUED | OUTPATIENT
Start: 2017-10-26 | End: 2017-10-30 | Stop reason: HOSPADM

## 2017-10-26 RX ORDER — FENTANYL CITRATE 50 UG/ML
100 INJECTION, SOLUTION INTRAMUSCULAR; INTRAVENOUS
Status: DISCONTINUED | OUTPATIENT
Start: 2017-10-26 | End: 2017-10-30 | Stop reason: HOSPADM

## 2017-10-26 RX ADMIN — HEPARIN SODIUM IN SODIUM CHLORIDE 200 UNITS: 200 INJECTION INTRAVENOUS at 12:25

## 2017-10-26 RX ADMIN — LIDOCAINE HYDROCHLORIDE 100 MG: 10; .005 INJECTION, SOLUTION EPIDURAL; INFILTRATION; INTRACAUDAL; PERINEURAL at 12:25

## 2017-10-26 RX ADMIN — MIDAZOLAM HYDROCHLORIDE 1 MG: 1 INJECTION, SOLUTION INTRAMUSCULAR; INTRAVENOUS at 12:15

## 2017-10-26 RX ADMIN — MIDAZOLAM HYDROCHLORIDE 1 MG: 1 INJECTION, SOLUTION INTRAMUSCULAR; INTRAVENOUS at 11:04

## 2017-10-26 RX ADMIN — FENTANYL CITRATE 12.5 MCG: 50 INJECTION, SOLUTION INTRAMUSCULAR; INTRAVENOUS at 11:07

## 2017-10-26 RX ADMIN — FENTANYL CITRATE 50 MCG: 50 INJECTION, SOLUTION INTRAMUSCULAR; INTRAVENOUS at 12:40

## 2017-10-26 RX ADMIN — LIDOCAINE HYDROCHLORIDE 400 MG: 20 INJECTION, SOLUTION INFILTRATION; PERINEURAL at 12:25

## 2017-10-26 RX ADMIN — FENTANYL CITRATE 12.5 MCG: 50 INJECTION, SOLUTION INTRAMUSCULAR; INTRAVENOUS at 10:55

## 2017-10-26 RX ADMIN — FENTANYL CITRATE 12.5 MCG: 50 INJECTION, SOLUTION INTRAMUSCULAR; INTRAVENOUS at 10:45

## 2017-10-26 RX ADMIN — SODIUM CHLORIDE 25 ML/HR: 900 INJECTION, SOLUTION INTRAVENOUS at 10:44

## 2017-10-26 RX ADMIN — ONDANSETRON 4 MG: 2 INJECTION INTRAMUSCULAR; INTRAVENOUS at 10:12

## 2017-10-26 RX ADMIN — CEFAZOLIN 2 G: 10 INJECTION, POWDER, FOR SOLUTION INTRAVENOUS; PARENTERAL at 11:00

## 2017-10-26 RX ADMIN — FENTANYL CITRATE 12.5 MCG: 50 INJECTION, SOLUTION INTRAMUSCULAR; INTRAVENOUS at 12:15

## 2017-10-26 RX ADMIN — SODIUM CHLORIDE, PRESERVATIVE FREE 500 UNITS: 5 INJECTION INTRAVENOUS at 12:25

## 2017-10-26 RX ADMIN — MIDAZOLAM HYDROCHLORIDE 0.5 MG: 1 INJECTION, SOLUTION INTRAMUSCULAR; INTRAVENOUS at 10:40

## 2017-10-26 RX ADMIN — MIDAZOLAM HYDROCHLORIDE 0.5 MG: 1 INJECTION, SOLUTION INTRAMUSCULAR; INTRAVENOUS at 10:49

## 2017-10-26 RX ADMIN — IOPAMIDOL 10 ML: 755 INJECTION, SOLUTION INTRAVENOUS at 12:25

## 2017-10-26 NOTE — H&P
Radiology History and Physical    Patient: Zohreh Pelletier 80 y.o. female     Chief Complaint: No chief complaint on file. History of Present Illness: Ovarian cancer. History:    Past Medical History:   Diagnosis Date    Autoimmune disease ([address]     Chronic kidney disease     GERD (gastroesophageal reflux disease)     Hypertension     Murmur      No family history on file. Social History     Social History    Marital status:      Spouse name: N/A    Number of children: N/A    Years of education: N/A     Occupational History    Not on file. Social History Main Topics    Smoking status: Never Smoker    Smokeless tobacco: Never Used    Alcohol use No    Drug use: No    Sexual activity: Not on file     Other Topics Concern    Not on file     Social History Narrative       Allergies: Allergies   Allergen Reactions    Valsartan Other (comments)     malaise       Current Medications:  Current Outpatient Prescriptions   Medication Sig    furosemide (LASIX) 40 mg tablet Take 1 Tab by mouth daily.  aspirin 81 mg chewable tablet Take 81 mg by mouth daily.  amLODIPine (NORVASC) 2.5 mg tablet Take 2.5 mg by mouth daily.  pantoprazole (PROTONIX) 40 mg tablet Take 40 mg by mouth daily.  potassium chloride SR (KLOR-CON 10) 10 mEq tablet Take 10 mEq by mouth daily.  ezetimibe-simvastatin (VYTORIN 10/10) 10-10 mg per tablet Take 1 Tab by mouth nightly.  magnesium oxide (MAG-OX) 400 mg tablet Take 400 mg by mouth daily.  omega-3 fatty acids-vitamin e (FISH OIL) 1,000 mg cap Take 1 Cap by mouth daily. No current facility-administered medications for this encounter.       Facility-Administered Medications Ordered in Other Encounters   Medication Dose Route Frequency    midazolam (VERSED) injection 5 mg  5 mg IntraVENous Multiple    fentaNYL citrate (PF) injection 100 mcg  100 mcg IntraVENous Multiple    0.9% sodium chloride infusion  25 mL/hr IntraVENous CONTINUOUS    lidocaine (PF) (XYLOCAINE) 10 mg/mL (1 %) injection            Physical Exam:  There were no vitals taken for this visit. GENERAL: fatigued, cooperative, mild distress, appears stated age, LUNG: clear to auscultation bilaterally, HEART: regular rate and rhythm      Alerts:      Laboratory:    No results for input(s): HGB, HCT, WBC, PLT, INR, BUN, CREA, K, CRCLT, HGBEXT, HCTEXT, PLTEXT in the last 72 hours. No lab exists for component: PTT, PT, INREXT      Plan of Care/Planned Procedure:  Risks, benefits, and alternatives reviewed with patient and she agrees to proceed with the procedure.

## 2017-10-26 NOTE — ROUTINE PROCESS
Abdominal mass biopsy and Paracentesis procedures completed, patient taken to Angio recovery awaiting port placement.

## 2017-10-26 NOTE — ROUTINE PROCESS
Patient and family provided with verbal and written discharge instructions. Patient discharged via wheelchair with family to transport home.

## 2017-10-26 NOTE — PROCEDURES
PROCEDURE:CT-guided biopsy, paracentesis, port placement. INDICATION:presumed ovarian cancer. ANESTHESIA:sedation and local.  COMPLICATION:NONE. SPECIMENS REMOVED:ongoing paracentesis; omental nodule biopsy to Dr. Russ Huertas. BLOOD LOSS:NONE. /ASSISTANT:DARIA Hernandez RECOMMENDATIONS:may use port. CONSENT OBTAINED:YES.  NOTES:none.

## 2017-10-26 NOTE — DISCHARGE INSTRUCTIONS
[facility].  Special Procedures/Radiology Department    Radiologist:  Dr. Barbi Franco      Date:    10/26/17    Biopsy Discharge Instructions    You may have an aching pain in the biopsy site tonight. You may Tylenol, as directed on the label, for pain or discomfort. Avoid ibuprofen (Advil, Motrin) and aspirin for the next 48 hours as these drugs may cause you to bleed. Watch for bleeding from the biopsy site. Hold pressure to the area for at least 15 minutes if bleeding does occur. If you have severe pain or swelling at the site, go directly to the nearest Emergency Room. Watch for signs of infection:  redness, pain, pus, drainage, fever or chills. If this occurs, call your doctor. Resume your previous diet and follow the medication reconciliation form. Rest the remainder of today. Do not lift anything heavier than a small grocery bag (10 pounds) for the next 5 days. It may take up to 3 days for your test results to become available to your physician. Call your physician if you have not heard anything after 3 business day. Paracentesis Discharge Instructions    You may have an aching pain in your abdomen at the puncture site tonight. You may take Tylenol, as directed on the label, for the pain or discomfort. Resume your previous diet and follow the medication reconciliation form. Rest today. Watch for signs of infection at the puncture site:  redness, swelling, pus, fever or chills. If this occurs, call your doctor immediately or go to the nearest Emergency Room. If you experience severe sweating, severe abdominal pain, dizziness or faintness, go to the nearest Emergency Room immediately. Portacath Discharge Instructions      Watch for signs of infection:    1. Redness,   2. Fever, chills,   3. Increased pain, and/or drainage from the site. If this occurs, call your physician at once.     Return next week for a Port Site Check check:     Do not sign in. Go to the podium, and ask them to call our department [phone] Please try to come between 9:00AM and 1:00PM    Keep your dressing clean and dry. Leave the dressing in place until seen here next week. The dressing may be changed in your physicians office. Continue your previous diet and follow the medication reconciliation list.    You may take Tylenol, as directed on the label, for pain. Avoid ibuprofen (Advil, Motrin) and aspirin as they may cause you to bleed. Because you received sedation, you are not to drive or sign any legal documents for the next 24 hours. Do not lift anything heavier than 5 pounds with the affected arm for the next week.       If you have any questions or concerns, please call [phone] and ask for the nurse on-call

## 2017-10-26 NOTE — IP AVS SNAPSHOT
[address] [facility]. 
[phone] Patient: Jaxon Negro MRN: [id number] :[id number] About your hospitalization You were admitted on:  2017 You last received care in the:  [facility] RAD CT You were discharged on:  2017 Why you were hospitalized Your primary diagnosis was:  Not on File Discharge Orders None A check papito indicates which time of day the medication should be taken. My Medications ASK your physician about these medications Instructions Each Dose to Equal  
 Morning Noon Evening Bedtime  
 amLODIPine 2.5 mg tablet Commonly known as:  Voncile Awendaw Your last dose was: Your next dose is: Take 2.5 mg by mouth daily. 2.5 mg  
    
   
   
   
  
 aspirin 81 mg chewable tablet Your last dose was: Your next dose is: Take 81 mg by mouth daily. 81 mg FISH OIL 1,000 mg Cap Generic drug:  omega-3 fatty acids-vitamin e Your last dose was: Your next dose is: Take 1 Cap by mouth daily. 1 Cap  
    
   
   
   
  
 furosemide 40 mg tablet Commonly known as:  LASIX Your last dose was: Your next dose is: Take 1 Tab by mouth daily. 40 mg KLOR-CON 10 10 mEq tablet Generic drug:  potassium chloride SR Your last dose was: Your next dose is: Take 10 mEq by mouth daily. 10 mEq  
    
   
   
   
  
 magnesium oxide 400 mg tablet Commonly known as:  MAG-OX Your last dose was: Your next dose is: Take 400 mg by mouth daily. 400 mg PROTONIX 40 mg tablet Generic drug:  pantoprazole Your last dose was: Your next dose is: Take 40 mg by mouth daily.   
 40 mg  
    
   
   
   
  
 Vytorin 10/10 10-10 mg per tablet Generic drug:  ezetimibe-simvastatin Your last dose was: Your next dose is: Take 1 Tab by mouth nightly. 1 Tab Discharge Instructions [facility] Special Procedures/Radiology Department Radiologist:  Dr. Arnel Gore   
 
Date:    10/26/17 Biopsy Discharge Instructions You may have an aching pain in the biopsy site tonight. You may Tylenol, as directed on the label, for pain or discomfort. Avoid ibuprofen (Advil, Motrin) and aspirin for the next 48 hours as these drugs may cause you to bleed. Watch for bleeding from the biopsy site. Hold pressure to the area for at least 15 minutes if bleeding does occur. If you have severe pain or swelling at the site, go directly to the nearest Emergency Room. Watch for signs of infection:  redness, pain, pus, drainage, fever or chills. If this occurs, call your doctor. Resume your previous diet and follow the medication reconciliation form. Rest the remainder of today. Do not lift anything heavier than a small grocery bag (10 pounds) for the next 5 days. It may take up to 3 days for your test results to become available to your physician. Call your physician if you have not heard anything after 3 business day. Paracentesis Discharge Instructions You may have an aching pain in your abdomen at the puncture site tonight. You may take Tylenol, as directed on the label, for the pain or discomfort. Resume your previous diet and follow the medication reconciliation form. Rest today. Watch for signs of infection at the puncture site:  redness, swelling, pus, fever or chills. If this occurs, call your doctor immediately or go to the nearest Emergency Room.  
 
If you experience severe sweating, severe abdominal pain, dizziness or faintness, go to the nearest Emergency Room immediately. Portacat Discharge Instructions Watch for signs of infection: 1. Redness,  
2. Fever, chills,  
3. Increased pain, and/or drainage from the site. If this occurs, call your physician at once. Return next week for a [employer] check: Do not sign in. Go to the podium, and ask them to call our department [phone] Please try to come between 9:00AM and 1:00PM 
 
Keep your dressing clean and dry. Leave the dressing in place until seen here next week. The dressing may be changed in your physicians office. Continue your previous diet and follow the medication reconciliation list. 
 
You may take Tylenol, as directed on the label, for pain. Avoid ibuprofen (Advil, Motrin) and aspirin as they may cause you to bleed. Because you received sedation, you are not to drive or sign any legal documents for the next 24 hours. Do not lift anything heavier than 5 pounds with the affected arm for the next week. If you have any questions or concerns, please call [phone] and ask for the nurse on-call Introducing [facility]! Meeta Lozano introduces New KCBX patient portal. Now you can access parts of your medical record, email your doctor's office, and request medication refills online. 1. In your internet browser, go to [email or web address] 2. Click on the First Time User? Click Here link in the Sign In box. You will see the New Member Sign Up page. 3. Enter your New KCBX Access Code exactly as it appears below. You will not need to use this code after youve completed the sign-up process. If you do not sign up before the expiration date, you must request a new code. · New KCBX Access Code: [email or web address] Expires: 1/23/2018 12:01 PM 
 
4.  Enter the last four digits of your Social Security Number (xxxx) and Date of Birth (mm/dd/yyyy) as indicated and click Submit. You will be taken to the next sign-up page. 5. Create a Layar ID. This will be your Layar login ID and cannot be changed, so think of one that is secure and easy to remember. 6. Create a Layar password. You can change your password at any time. 7. Enter your Password Reset Question and Answer. This can be used at a later time if you forget your password. 8. Enter your e-mail address. You will receive e-mail notification when new information is available in [address] 9. Click Sign Up. You can now view and download portions of your medical record. 10. Click the Download Summary menu link to download a portable copy of your medical information. If you have questions, please visit the Frequently Asked Questions section of the Layar website. Remember, Layar is NOT to be used for urgent needs. For medical emergencies, dial 911. Now available from your iPhone and Android! Providers Seen During Your Hospitalization Provider Specialty Primary office phone Ama Eagle MD Hematology and Oncology [phone] Your Primary Care Physician (PCP) Primary Care Physician Office Phone Office Fax Keren Matta [phone] You are allergic to the following Allergen Reactions Valsartan Other (comments)  
 malaise Recent Documentation Height Weight BMI OB Status Smoking Status 1.575 m 81.6 kg 32.92 kg/m2 Hysterectomy Never Smoker Emergency Contacts Name Discharge Info Relation Home Work Mobile Asif Carl DISCHARGE CAREGIVER [3] Child [2] [phone] Mando Carl DISCHARGE CAREGIVER [3] Other Relative [6] [phone] Patient Belongings The following personal items are in your possession at time of discharge: 
     Visual Aid: Glasses (with patient) Please provide this summary of care documentation to your next provider. Signatures-by signing, you are acknowledging that this After Visit Summary has been reviewed with you and you have received a copy. Patient Signature:  ____________________________________________________________ Date:  ____________________________________________________________  
  
[facility] Provider Signature:  ____________________________________________________________ Date:  ____________________________________________________________

## 2017-10-26 NOTE — ROUTINE PROCESS
Dr. Soni Rodriguez at bedside explaining procedure to patient and providing patient with risks and benefits of procedure. Patient verbalized understanding and signed consent for procedure.

## 2017-10-26 NOTE — ROUTINE PROCESS
Jerrod Later, removed paracentesis catheter, site covered with dry sterile dressing, no bleeding or hematoma.

## 2017-11-10 ENCOUNTER — HOSPITAL ENCOUNTER (OUTPATIENT)
Dept: ULTRASOUND IMAGING | Age: 82
Discharge: HOME | End: 2017-11-10
Attending: FAMILY MEDICINE
Payer: MEDICARE

## 2017-11-10 VITALS
DIASTOLIC BLOOD PRESSURE: 63 MMHG | HEIGHT: 63 IN | SYSTOLIC BLOOD PRESSURE: 139 MMHG | RESPIRATION RATE: 20 BRPM | HEART RATE: 86 BPM | TEMPERATURE: 98.6 F | BODY MASS INDEX: 32.43 KG/M2 | OXYGEN SATURATION: 99 % | WEIGHT: 183 LBS

## 2017-11-10 DIAGNOSIS — R18.8 ASCITES: ICD-10-CM

## 2017-11-10 LAB — INR BLD: 1 (ref 0.9–1.2)

## 2017-11-10 PROCEDURE — 85610 PROTHROMBIN TIME: CPT

## 2017-11-10 PROCEDURE — 76705 ECHO EXAM OF ABDOMEN: CPT

## 2017-11-10 NOTE — PROGRESS NOTES
Amira Souza from Dr Tsering Sheriff office informed that patient does not have enough fluid to drain for paracentesis.

## 2017-11-10 NOTE — DISCHARGE INSTRUCTIONS
[address] [facility]  Special Procedures/Radiology Department      Radiologist:      Date:      Paracentesis Discharge Instructions    You may have an aching pain in your abdomen at the puncture site tonight. You may take Tylenol, as directed on the label, for the pain or discomfort. Resume your previous diet and follow the medication reconciliation form. Rest today. Watch for signs of infection at the puncture site:  redness, swelling, pus, fever or chills. If this occurs, call your doctor immediately or go to the nearest Emergency Room. If you experience severe sweating, severe abdominal pain, dizziness or faintness, go to the nearest Emergency Room immediately. If you have any questions or concerns, please call [phone], and ask to speak to the nurse on-call.     Other:

## 2017-11-10 NOTE — IP AVS SNAPSHOT
[address] [facility] 
[phone] Patient: Rich Kelly MRN: [id number] :[id number] About your hospitalization You were admitted on:  November 10, 2017 You last received care in the:  [facility] Ultrasound You were discharged on:  November 10, 2017 Why you were hospitalized Your primary diagnosis was:  Not on File Discharge Orders None A check papito indicates which time of day the medication should be taken. My Medications ASK your physician about these medications Instructions Each Dose to Equal  
 Morning Noon Evening Bedtime  
 amLODIPine 2.5 mg tablet Commonly known as:  Eri Spruce Your last dose was: Your next dose is: Take 2.5 mg by mouth daily. 2.5 mg  
    
   
   
   
  
 aspirin 81 mg chewable tablet Your last dose was: Your next dose is: Take 81 mg by mouth daily. 81 mg FISH OIL 1,000 mg Cap Generic drug:  omega-3 fatty acids-vitamin e Your last dose was: Your next dose is: Take 1 Cap by mouth daily. 1 Cap  
    
   
   
   
  
 furosemide 40 mg tablet Commonly known as:  LASIX Your last dose was: Your next dose is: Take 1 Tab by mouth daily. 40 mg KLOR-CON 10 10 mEq tablet Generic drug:  potassium chloride SR Your last dose was: Your next dose is: Take 10 mEq by mouth daily. 10 mEq  
    
   
   
   
  
 magnesium oxide 400 mg tablet Commonly known as:  MAG-OX Your last dose was: Your next dose is: Take 400 mg by mouth daily. 400 mg PROTONIX 40 mg tablet Generic drug:  pantoprazole Your last dose was: Your next dose is: Take 40 mg by mouth daily.   
 40 mg  
    
   
 Vytorin 10/10 10-10 mg per tablet Generic drug:  ezetimibe-simvastatin Your last dose was: Your next dose is: Take 1 Tab by mouth nightly. 1 Tab Discharge Instructions Jonathan Mcdaniel [facility] Special Procedures/Radiology Department Radiologist:   
 
Date:   
 
Paracentesis Discharge Instructions You may have an aching pain in your abdomen at the puncture site tonight. You may take Tylenol, as directed on the label, for the pain or discomfort. Resume your previous diet and follow the medication reconciliation form. Rest today. Watch for signs of infection at the puncture site:  redness, swelling, pus, fever or chills. If this occurs, call your doctor immediately or go to the nearest Emergency Room. If you experience severe sweating, severe abdominal pain, dizziness or faintness, go to the nearest Emergency Room immediately. If you have any questions or concerns, please call [phone], and ask to speak to the nurse on-call. Other: 
 
 
  
  
  
Introducing [facility]! Jonathan Mcdaniel introduces OPENLANE patient portal. Now you can access parts of your medical record, email your doctor's office, and request medication refills online. 1. In your internet browser, go to [email or web address] 2. Click on the First Time User? Click Here link in the Sign In box. You will see the New Member Sign Up page. 3. Enter your OPENLANE Access Code exactly as it appears below. You will not need to use this code after youve completed the sign-up process. If you do not sign up before the expiration date, you must request a new code. · OPENLANE Access Code: [email or web address] Expires: 1/23/2018 11:01 AM 
 
4. Enter the last four digits of your Social Security Number (xxxx) and Date of Birth (mm/dd/yyyy) as indicated and click Submit. You will be taken to the next sign-up page. 5. Create a Pango ID. This will be your Pango login ID and cannot be changed, so think of one that is secure and easy to remember. 6. Create a Pango password. You can change your password at any time. 7. Enter your Password Reset Question and Answer. This can be used at a later time if you forget your password. 8. Enter your e-mail address. You will receive e-mail notification when new information is available in [address] 9. Click Sign Up. You can now view and download portions of your medical record. 10. Click the Download Summary menu link to download a portable copy of your medical information. If you have questions, please visit the Frequently Asked Questions section of the Pango website. Remember, Pango is NOT to be used for urgent needs. For medical emergencies, dial 911. Now available from your iPhone and Android! Providers Seen During Your Hospitalization Provider Specialty Primary office phone Rani Toscano MD [facility] [phone] Your Primary Care Physician (PCP) Primary Care Physician Office Phone Office Fax [place] [phone] You are allergic to the following Allergen Reactions Valsartan Other (comments)  
 malaise Recent Documentation OB Status Smoking Status Hysterectomy Never Smoker Emergency Contacts Name Discharge Info Relation Home Work Mobile Asif Carl DISCHARGE CAREGIVER [3] Child [2] [phone] Mando Carl DISCHARGE CAREGIVER [3] Other Relative [6] [phone] Patient Belongings The following personal items are in your possession at time of discharge: 
                             
 
  
  
 Please provide this summary of care documentation to your next provider. Signatures-by signing, you are acknowledging that this After Visit Summary has been reviewed with you and you have received a copy. Patient Signature:  ____________________________________________________________ Date:  ____________________________________________________________  
  
[facility] Provider Signature:  ____________________________________________________________ Date:  ____________________________________________________________

## 2017-11-14 ENCOUNTER — TELEPHONE (OUTPATIENT)
Dept: MAMMOGRAPHY | Age: 82
End: 2017-11-14

## 2017-11-15 NOTE — TELEPHONE ENCOUNTER
Faxed ultrasound report to Dr. Juana Jaime through [facility], where biopsy of breast was recommended in addendum on 10/19/17 and have not heard if biopsy was needed. Will follow up with office next week to see if we can biopsy is needed or not.

## 2017-11-29 ENCOUNTER — TELEPHONE (OUTPATIENT)
Dept: MAMMOGRAPHY | Age: 82
End: 2017-11-29

## 2017-11-29 NOTE — TELEPHONE ENCOUNTER
I spoke w/Dr. Montanez Spine office and advised that Ms. Anna Moulton was transferred to Dr. Diogenes brock. I spoke w/Eleuterio, Pt coordinator, concerning recommendation for breast biopsy from mammo/us 10/17/17. Alberto Blackmon advised that Ms. Anna Moulton has an appointment to see Dr. Chery Mcneal tomorrow for chemo and follow up and will address breast recommendations. Alberto Blackmon will call back concerning how Dr. Chery Mcneal would like to address breast biopsy recommendation.

## 2017-11-30 ENCOUNTER — TELEPHONE (OUTPATIENT)
Dept: MAMMOGRAPHY | Age: 82
End: 2017-11-30

## 2017-11-30 NOTE — TELEPHONE ENCOUNTER
Dr. Enrique Spence office returned call (spoke with Carline Michel) and she stated that Dr. Neena Denver has already set the pt. Up with Dr. Nelsy Claros to perform the biopsy in her office on 12/20/2017.

## 2017-12-20 ENCOUNTER — DOCUMENTATION ONLY (OUTPATIENT)
Dept: SURGERY | Age: 82
End: 2017-12-20

## 2017-12-20 ENCOUNTER — OFFICE VISIT (OUTPATIENT)
Dept: SURGERY | Age: 82
End: 2017-12-20

## 2017-12-20 ENCOUNTER — HOSPITAL ENCOUNTER (OUTPATIENT)
Dept: LAB | Age: 82
Discharge: HOME | End: 2017-12-20

## 2017-12-20 VITALS
BODY MASS INDEX: 29.77 KG/M2 | WEIGHT: 168 LBS | HEIGHT: 63 IN | SYSTOLIC BLOOD PRESSURE: 184 MMHG | DIASTOLIC BLOOD PRESSURE: 72 MMHG | HEART RATE: 92 BPM

## 2017-12-20 DIAGNOSIS — N63.20 LEFT BREAST MASS: Primary | ICD-10-CM

## 2017-12-20 NOTE — PROGRESS NOTES
HISTORY OF PRESENT ILLNESS  Pete Duncan is a 80 y.o. female. HPI  NEW patient consult referred by Dr. Ale Coronado for LEFT breast mass. Patient does not feel a breast lump. Denies skin changes or nipple discharge/retraction. Patient has been diagnosed with ovarian cancer and has had some elevated CA 27 29. Genetic testing pending. She is currently getting chemotherapy. History of benign biopsy to RIGHT breast at [facility] in 2014. FH:  Niece had ovarian cancer. She is a survivor and BRCA neg. Mammogram/ultrasound, 10/19/17, BIRADS 4  Addendum   MD Dinesh Christensen Nov 3, 2017 10:36:39 AM EDT   Addendum: Addendum:     The current study is compared to a prior study performed 3/2/2017 as well as a  study performed 2/24/2015. The bilobed structure in the upper outer quadrant of  the left breast was not present on the 2015 study, but was present on the March 2017 study. However, it appears to have increased in size from 6.1 to 7.5 mm. A  clip in the medial aspect of the right breast was present on that study. Biopsy of this structure is recommended because of the patient's high risk  status. This result was verbally relayed to Gary Lam in Dr. Patty Agustin office at 1036  hours. BI-RADS Category 4: Suspicious: Biopsy recommended   Study Result   STUDY:  Bilateral Diagnostic Digital Mammography and Left breast ultrasound     INDICATION: Carcinomatosis of unknown primary. Very high CA 27-29 level. Concern  for primary breast malignancy.     COMPARISON:  None. Outside films in [address] [place] [address]: Scattered     FINDINGS:      Bilateral digital diagnostic mammography was performed, and is interpreted in  conjunction with a computer assisted detection (CAD) system. There is a bilobed  7.4 mm nodular structure located 4.8 cm from the nipple in the upper outer  quadrant of the left breast, middle one third.   A biopsy clip is also noted in  the left breast at the 9:00 position, middle one third.     Left breast ultrasound     Targeted ultrasound was performed at the 1:00 position of the left breast, 2 cm  from the nipple. There is a 5.7 x 6.8 x 4.4 mm not structure with increased  through transmission that may represent a bilobed cyst or lymph node. No  abnormal solid mass is identified.     IMPRESSION  IMPRESSION:     1. No mammographic evidence for malignancy  2. The prior films will be obtained and compared and an addendum will be issued. 3. The patient has been notified of these results and recommendations. 4. BI-RADS Category 0: Incomplete: Old mammograms dated for comparison.           Past Medical History:   Diagnosis Date    [facility]immune disease [facility]     Cancer ([facility]     OVARIAN     Chronic kidney disease     GERD (gastroesophageal reflux disease)     Hypertension     Murmur      Past Surgical History:   Procedure Laterality Date    HX BREAST BIOPSY Left     3 yrs ago----neg    HX HYSTERECTOMY      HX OTHER SURGICAL      Parathyroidectomy      Social History     Social History    Marital status: UNKNOWN     Spouse name: N/A    Number of children: N/A    Years of education: N/A     Occupational History    Not on file. Social History Main Topics    Smoking status: Never Smoker    Smokeless tobacco: Never Used    Alcohol use No    Drug use: No    Sexual activity: Not on file     Other Topics Concern    Not on file     Social History Narrative     OB History      Para Term  AB Living    2         SAB TAB Ectopic Molar Multiple Live Births                 Obstetric Comments    Menarche:  15   LMP: 46.  # of Children: 2 . Age at Delivery of First Child:  21.   Hysterectomy/oophorectomy:  YES/NO. Breast Bx:  YES. Hx of Breast Feeding:  YES.  BCP:  YES. Hormone therapy:  YES. Current Outpatient Prescriptions:     aspirin 81 mg chewable tablet, Take 81 mg by mouth daily. , Disp: , Rfl:     amLODIPine (NORVASC) 2.5 mg tablet, Take 2.5 mg by mouth daily., Disp: , Rfl:     pantoprazole (PROTONIX) 40 mg tablet, Take 40 mg by mouth daily. , Disp: , Rfl:     potassium chloride SR (KLOR-CON 10) 10 mEq tablet, Take 10 mEq by mouth daily. , Disp: , Rfl:     ezetimibe-simvastatin (VYTORIN 10/10) 10-10 mg per tablet, Take 1 Tab by mouth nightly., Disp: , Rfl:     magnesium oxide (MAG-OX) 400 mg tablet, Take 400 mg by mouth daily. , Disp: , Rfl:     omega-3 fatty acids-vitamin e (FISH OIL) 1,000 mg cap, Take 1 Cap by mouth daily. , Disp: , Rfl:   Allergies   Allergen Reactions    Valsartan Other (comments)     malaise         Review of Systems   Constitutional: Positive for weight loss. HENT: Negative. Eyes: Negative. Respiratory: Negative. Cardiovascular: Negative. Gastrointestinal: Negative. Genitourinary: Negative. Musculoskeletal: Positive for joint pain. Skin: Negative. Neurological: Negative. Endo/Heme/Allergies: Negative. Psychiatric/Behavioral: Negative. Physical Exam   Constitutional: She appears well-developed and well-nourished. HENT:   Head: Normocephalic. Eyes: EOM are normal.   Neck: Neck supple. Cardiovascular: Intact distal pulses. Pulmonary/Chest: Effort normal. Right breast exhibits no inverted nipple, no mass, no nipple discharge, no skin change and no tenderness. Left breast exhibits no inverted nipple, no mass, no nipple discharge, no skin change and no tenderness. Breasts are symmetrical.   Abdominal: Soft. Musculoskeletal: Normal range of motion. Lymphadenopathy:     She has no cervical adenopathy. She has no axillary adenopathy. Neurological: She is alert. Skin: Skin is warm and dry. Psychiatric: She has a normal mood and affect. Vitals reviewed. US - Guided Core Biopsy  Indication : Palpable mass Left  breast 1:00 2 cfn   Ultrasound Findings: 7 mm lobulated mass, anechoic no shadowing. Prep : alcohol. Anesthesia : 1% lidocaine with epinephrine, 10 cc. Device :  The TherMark Marquee device was advanced through the lesion and captured tissue with real-time Ultrasound Confirmation. Core Sampling :  3 cores were obtained. Lesion appeared to resolve after first pass of the needle. Marker: hydromark  Dressing : Steristrips, gauze and tape. Instructions : Remove gauze this evening. Remove steristrips in one week. [address] [place] [address]  OFFICE PROCEDURE PROGRESS NOTE        Chart reviewed for the following:   Last Toney MD, have reviewed the History, Physical and updated the Allergic reactions for [address] [place] [address] performed immediately prior to start of procedure:   Last Toney MD, have performed the following reviews on Panchito Neal prior to the start of the procedure:            * Patient was identified by name and date of birth   * Agreement on procedure being performed was verified  * Risks and Benefits explained to the patient  * Procedure site verified and marked as necessary  * Patient was positioned for comfort  * Consent was signed and verified     Time: 10:30 am      Date of procedure: 12/20/2017    Procedure performed by:  Edmund Garay MD    Provider assisted by: Shelley Robbins RN    Patient assisted by: aguilar    How tolerated by patient: tolerated the procedure well with no complications    Post Procedural Pain Scale: 0 - No Hurt    Comments: none          ASSESSMENT and PLAN    ICD-10-CM ICD-9-CM    1. Left breast mass N63.20 611.72 SURGICAL PATHOLOGY     79 yo female with ovarian ca and birad 4 left breast mass. She tolerated core needle biopsy well. I will call aguilar with results.

## 2017-12-20 NOTE — MR AVS SNAPSHOT
Visit Information Date & Time Provider Department Dept. Phone Encounter #  
 12/20/2017 10:40 AM Moo Malcolm  [address] [id number] Upcoming Health Maintenance Date Due DTaP/Tdap/Td series (1 - Tdap) 8/16/1948 ZOSTER VACCINE AGE 60> 6/16/1987 GLAUCOMA SCREENING Q2Y 8/16/1992 Pneumococcal 65+ High/Highest Risk (1 of 2 - PCV13) 8/16/1992 MEDICARE YEARLY EXAM 8/16/1992 Influenza Age 5 to Adult 8/1/2017 Allergies as of 12/20/2017  Review Complete On: 12/20/2017 By: Moo Malcolm MD  
  
 Severity Noted Reaction Type Reactions Valsartan  03/24/2014    Other (comments)  
 malaise Current Immunizations  Reviewed on 3/24/2014 No immunizations on file. Not reviewed this visit You Were Diagnosed With   
  
 Codes Comments Left breast mass    -  Primary ICD-10-CM: N63.20 ICD-9-CM: 611.72 Vitals BP Pulse Height(growth percentile) Weight(growth percentile) BMI OB Status 184/72 92 5' 3\" (1.6 m) 168 lb (76.2 kg) 29.76 kg/m2 Hysterectomy Smoking Status Never Smoker BMI and BSA Data Body Mass Index Body Surface Area  
 29.76 kg/m 2 1.84 m 2 Your Updated Medication List  
  
   
This list is accurate as of: 12/20/17  1:03 PM.  Always use your most recent med list. amLODIPine 2.5 mg tablet Commonly known as:  V[place] Take 2.5 mg by mouth daily. aspirin 81 mg chewable tablet Take 81 mg by mouth daily. FISH OIL 1,000 mg Cap Generic drug:  omega-3 fatty acids-vitamin e Take 1 Cap by mouth daily. KLOR-CON 10 10 mEq tablet Generic drug:  potassium chloride SR Take 10 mEq by mouth daily. magnesium oxide 400 mg tablet Commonly known as:  MAG-OX Take 400 mg by mouth daily. PROTONIX 40 mg tablet Generic drug:  pantoprazole Take 40 mg by mouth daily. Vytorin 10/10 10-10 mg per tablet Generic drug:  ezetimibe-simvastatin Take 1 Tab by mouth nightly. We Performed the Following SURGICAL PATHOLOGY [SIW1476 Custom] Patient Instructions Needle Breast Biopsy: About This Test 
What is it? A breast biopsy removes a sample of breast tissue that is looked at under a microscope to check for breast cancer. For a needle breast biopsy, your doctor uses a needle to take a small sample of fluid or cells from the breast for testing. Why is this test done? A breast biopsy is usually done to check a lump found during a breast exam or a suspicious area found on a mammogram or other imaging. If there is a good chance that your doctor can get a sample without doing an open (surgical) biopsy, you can have a needle biopsy. You may have a choice of what kind of biopsy you prefer. How can you prepare for the test? 
Talk to your doctor about all your health conditions before the test. For example, tell your doctor if you: · Are taking any medicines. · Are allergic to any medicines. · Have had bleeding problems, or if you take aspirin or some other blood thinner. · Are or might be pregnant. What happens before the test? 
· You will take off your clothing above the waist. A paper or cloth gown will cover your shoulders. · You will sit or lie on an examination table. Your hands may be at your sides or raised above your head (whichever position makes it easiest to find the lump or suspicious area). · Your skin is washed with a special soap. · You may get a shot of medicine to numb the biopsy area on your breast. 
What happens during the test? 
· For a fine-needle aspiration biopsy, your doctor inserts a thin needle into the lump or suspicious area. Then he or she removes a sample of cells or fluid. · For a core needle biopsy: ¨ A small cut is made in your skin. ¨ Your doctor inserts a needle with a special tip.  Then he or she removes a sample of breast tissue about the size of a grain of rice. ¨ About 3 to 12 samples are needed to get the most accurate results. After either type of biopsy, the needle is removed and pressure is put on the needle site to stop any bleeding. The area is covered with a bandage. What else should you know about the test? 
· You will feel only a quick sting from the needle if you have a local anesthetic to numb the biopsy area. You may feel some pressure when the biopsy needle is put in. · For a core needle biopsy, the small cut for the needle does not usually need stitches. How long does the test take? · A fine-needle aspiration biopsy will take about 5 to 15 minutes. · A core needle biopsy will take about 15 minutes. What happens after the test? 
· You'll be told how long it may take to get your results back. · You will probably be able to go home right away. · You can go back to your usual activities right away, but avoid heavy lifting for 24 hours. · The site may be tender for 2 to 3 days. You may also have some bruising, swelling, or slight bleeding. ¨ You can use an ice pack. Put ice or a cold pack on the area for 10 to 20 minutes at a time. Put a thin cloth between the ice and your skin. ¨ Ask your doctor if you can take an over-the-counter pain medicine, such as acetaminophen (Tylenol), ibuprofen (Advil, Motrin), or naproxen (Aleve). Be safe with medicines. Read and follow all instructions on the label. · After a specialist looks at the biopsy sample for signs of cancer, your doctor's office will let you know the results. · If the test results are not clear, you may have another biopsy or test. 
Follow-up care is a key part of your treatment and safety. Be sure to make and go to all appointments, and call your doctor if you are having problems. It's also a good idea to keep a list of the medicines you take. Ask your doctor when you can expect to have your test results. Where can you learn more? Go to http://antony-cheri.info/. Enter T394 in the search box to learn more about \"Needle Breast Biopsy: About This Test.\" Current as of: May 12, 2017 Content Version: 11.4 © 3501-7286 [employer]. Care instructions adapted under license by [employer] (which disclaims liability or warranty for this information). If you have questions about a medical condition or this instruction, always ask your healthcare professional. [address] any warranty or liability for your use of this information. Needle Breast Biopsy: About This Test 
What is it? A breast biopsy removes a sample of breast tissue that is looked at under a microscope to check for breast cancer. For a needle breast biopsy, your doctor uses a needle to take a small sample of fluid or cells from the breast for testing. Why is this test done? A breast biopsy is usually done to check a lump found during a breast exam or a suspicious area found on a mammogram or other imaging. If there is a good chance that your doctor can get a sample without doing an open (surgical) biopsy, you can have a needle biopsy. You may have a choice of what kind of biopsy you prefer. How can you prepare for the test? 
Talk to your doctor about all your health conditions before the test. For example, tell your doctor if you: · Are taking any medicines. · Are allergic to any medicines. · Have had bleeding problems, or if you take aspirin or some other blood thinner. · Are or might be pregnant. What happens before the test? 
· You will take off your clothing above the waist. A paper or cloth gown will cover your shoulders. · You will sit or lie on an examination table. Your hands may be at your sides or raised above your head (whichever position makes it easiest to find the lump or suspicious area). · Your skin is washed with a special soap. · You may get a shot of medicine to numb the biopsy area on your breast. 
What happens during the test? 
· For a fine-needle aspiration biopsy, your doctor inserts a thin needle into the lump or suspicious area. Then he or she removes a sample of cells or fluid. · For a core needle biopsy: ¨ A small cut is made in your skin. ¨ Your doctor inserts a needle with a special tip. Then he or she removes a sample of breast tissue about the size of a grain of rice. ¨ About 3 to 12 samples are needed to get the most accurate results. After either type of biopsy, the needle is removed and pressure is put on the needle site to stop any bleeding. The area is covered with a bandage. What else should you know about the test? 
· You will feel only a quick sting from the needle if you have a local anesthetic to numb the biopsy area. You may feel some pressure when the biopsy needle is put in. · For a core needle biopsy, the small cut for the needle does not usually need stitches. How long does the test take? · A fine-needle aspiration biopsy will take about 5 to 15 minutes. · A core needle biopsy will take about 15 minutes. What happens after the test? 
· You'll be told how long it may take to get your results back. · You will probably be able to go home right away. · You can go back to your usual activities right away, but avoid heavy lifting for 24 hours. · The site may be tender for 2 to 3 days. You may also have some bruising, swelling, or slight bleeding. ¨ You can use an ice pack. Put ice or a cold pack on the area for 10 to 20 minutes at a time. Put a thin cloth between the ice and your skin. ¨ Ask your doctor if you can take an over-the-counter pain medicine, such as acetaminophen (Tylenol), ibuprofen (Advil, Motrin), or naproxen (Aleve). Be safe with medicines. Read and follow all instructions on the label.  
· After a specialist looks at the biopsy sample for signs of cancer, your doctor's office will let you know the results. · If the test results are not clear, you may have another biopsy or test. 
Follow-up care is a key part of your treatment and safety. Be sure to make and go to all appointments, and call your doctor if you are having problems. It's also a good idea to keep a list of the medicines you take. Ask your doctor when you can expect to have your test results. Where can you learn more? Go to http://antony-cheri.info/. Enter U358 in the search box to learn more about \"Needle Breast Biopsy: About This Test.\" Current as of: May 12, 2017 Content Version: 11.4 © 8584-5150 [employer]NEF[employer]. Care instructions adapted under license by [employer] (which disclaims liability or warranty for this information). If you have questions about a medical condition or this instruction, always ask your healthcare professional. [place] [address] any warranty or liability for your use of this information. Introducing [facility]! [employer] introduces PeopLease patient portal. Now you can access parts of your medical record, email your doctor's office, and request medication refills online. 1. In your internet browser, go to [email or web address] 2. Click on the First Time User? Click Here link in the Sign In box. You will see the New Member Sign Up page. 3. Enter your PeopLease Access Code exactly as it appears below. You will not need to use this code after youve completed the sign-up process. If you do not sign up before the expiration date, you must request a new code. · PeopLease Access Code: [email or web address] Expires: 1/23/2018 11:01 AM 
 
4. Enter the last four digits of your Social Security Number (xxxx) and Date of Birth (mm/dd/yyyy) as indicated and click Submit. You will be taken to the next sign-up page. 5. Create a PeopLease ID.  This will be your PeopLease login ID and cannot be changed, so think of one that is secure and easy to remember. 6. Create a FanGager (MyBrandz) password. You can change your password at any time. 7. Enter your Password Reset Question and Answer. This can be used at a later time if you forget your password. 8. Enter your e-mail address. You will receive e-mail notification when new information is available in [address] 9. Click Sign Up. You can now view and download portions of your medical record. 10. Click the Download Summary menu link to download a portable copy of your medical information. If you have questions, please visit the Frequently Asked Questions section of the FanGager (MyBrandz) website. Remember, FanGager (MyBrandz) is NOT to be used for urgent needs. For medical emergencies, dial 911. Now available from your iPhone and Android! Please provide this summary of care documentation to your next provider. Your primary care clinician is listed as Audrey Comp. If you have any questions after today's visit, please call [phone].

## 2017-12-20 NOTE — PATIENT INSTRUCTIONS
Needle Breast Biopsy: About This Test  What is it? A breast biopsy removes a sample of breast tissue that is looked at under a microscope to check for breast cancer. For a needle breast biopsy, your doctor uses a needle to take a small sample of fluid or cells from the breast for testing. Why is this test done? A breast biopsy is usually done to check a lump found during a breast exam or a suspicious area found on a mammogram or other imaging. If there is a good chance that your doctor can get a sample without doing an open (surgical) biopsy, you can have a needle biopsy. You may have a choice of what kind of biopsy you prefer. How can you prepare for the test?  Talk to your doctor about all your health conditions before the test. For example, tell your doctor if you:  · Are taking any medicines. · Are allergic to any medicines. · Have had bleeding problems, or if you take aspirin or some other blood thinner. · Are or might be pregnant. What happens before the test?  · You will take off your clothing above the waist. A paper or cloth gown will cover your shoulders. · You will sit or lie on an examination table. Your hands may be at your sides or raised above your head (whichever position makes it easiest to find the lump or suspicious area). · Your skin is washed with a special soap. · You may get a shot of medicine to numb the biopsy area on your breast.  What happens during the test?  · For a fine-needle aspiration biopsy, your doctor inserts a thin needle into the lump or suspicious area. Then he or she removes a sample of cells or fluid. · For a core needle biopsy:  ¨ A small cut is made in your skin. ¨ Your doctor inserts a needle with a special tip. Then he or she removes a sample of breast tissue about the size of a grain of rice. ¨ About 3 to 12 samples are needed to get the most accurate results.   After either type of biopsy, the needle is removed and pressure is put on the needle site to stop any bleeding. The area is covered with a bandage. What else should you know about the test?  · You will feel only a quick sting from the needle if you have a local anesthetic to numb the biopsy area. You may feel some pressure when the biopsy needle is put in. · For a core needle biopsy, the small cut for the needle does not usually need stitches. How long does the test take? · A fine-needle aspiration biopsy will take about 5 to 15 minutes. · A core needle biopsy will take about 15 minutes. What happens after the test?  · You'll be told how long it may take to get your results back. · You will probably be able to go home right away. · You can go back to your usual activities right away, but avoid heavy lifting for 24 hours. · The site may be tender for 2 to 3 days. You may also have some bruising, swelling, or slight bleeding. ¨ You can use an ice pack. Put ice or a cold pack on the area for 10 to 20 minutes at a time. Put a thin cloth between the ice and your skin. ¨ Ask your doctor if you can take an over-the-counter pain medicine, such as acetaminophen (Tylenol), ibuprofen (Advil, Motrin), or naproxen (Aleve). Be safe with medicines. Read and follow all instructions on the label. · After a specialist looks at the biopsy sample for signs of cancer, your doctor's office will let you know the results. · If the test results are not clear, you may have another biopsy or test.  Follow-up care is a key part of your treatment and safety. Be sure to make and go to all appointments, and call your doctor if you are having problems. It's also a good idea to keep a list of the medicines you take. Ask your doctor when you can expect to have your test results. Where can you learn more? Go to http://antony-cheri.info/. Enter T429 in the search box to learn more about \"Needle Breast Biopsy: About This Test.\"  Current as of:  May 12, 2017  Content Version: 11.4  © 7367-2275 [employer]. Care instructions adapted under license by [employer] (which disclaims liability or warranty for this information). If you have questions about a medical condition or this instruction, always ask your healthcare professional. [address] any warranty or liability for your use of this information. Needle Breast Biopsy: About This Test  What is it? A breast biopsy removes a sample of breast tissue that is looked at under a microscope to check for breast cancer. For a needle breast biopsy, your doctor uses a needle to take a small sample of fluid or cells from the breast for testing. Why is this test done? A breast biopsy is usually done to check a lump found during a breast exam or a suspicious area found on a mammogram or other imaging. If there is a good chance that your doctor can get a sample without doing an open (surgical) biopsy, you can have a needle biopsy. You may have a choice of what kind of biopsy you prefer. How can you prepare for the test?  Talk to your doctor about all your health conditions before the test. For example, tell your doctor if you:  · Are taking any medicines. · Are allergic to any medicines. · Have had bleeding problems, or if you take aspirin or some other blood thinner. · Are or might be pregnant. What happens before the test?  · You will take off your clothing above the waist. A paper or cloth gown will cover your shoulders. · You will sit or lie on an examination table. Your hands may be at your sides or raised above your head (whichever position makes it easiest to find the lump or suspicious area). · Your skin is washed with a special soap. · You may get a shot of medicine to numb the biopsy area on your breast.  What happens during the test?  · For a fine-needle aspiration biopsy, your doctor inserts a thin needle into the lump or suspicious area. Then he or she removes a sample of cells or fluid.   · For a core needle biopsy:  ¨ A small cut is made in your skin. ¨ Your doctor inserts a needle with a special tip. Then he or she removes a sample of breast tissue about the size of a grain of rice. ¨ About 3 to 12 samples are needed to get the most accurate results. After either type of biopsy, the needle is removed and pressure is put on the needle site to stop any bleeding. The area is covered with a bandage. What else should you know about the test?  · You will feel only a quick sting from the needle if you have a local anesthetic to numb the biopsy area. You may feel some pressure when the biopsy needle is put in. · For a core needle biopsy, the small cut for the needle does not usually need stitches. How long does the test take? · A fine-needle aspiration biopsy will take about 5 to 15 minutes. · A core needle biopsy will take about 15 minutes. What happens after the test?  · You'll be told how long it may take to get your results back. · You will probably be able to go home right away. · You can go back to your usual activities right away, but avoid heavy lifting for 24 hours. · The site may be tender for 2 to 3 days. You may also have some bruising, swelling, or slight bleeding. ¨ You can use an ice pack. Put ice or a cold pack on the area for 10 to 20 minutes at a time. Put a thin cloth between the ice and your skin. ¨ Ask your doctor if you can take an over-the-counter pain medicine, such as acetaminophen (Tylenol), ibuprofen (Advil, Motrin), or naproxen (Aleve). Be safe with medicines. Read and follow all instructions on the label. · After a specialist looks at the biopsy sample for signs of cancer, your doctor's office will let you know the results. · If the test results are not clear, you may have another biopsy or test.  Follow-up care is a key part of your treatment and safety. Be sure to make and go to all appointments, and call your doctor if you are having problems.  It's also a good idea to keep a list of the medicines you take. Ask your doctor when you can expect to have your test results. Where can you learn more? Go to http://antony-cheri.info/. Enter F132 in the search box to learn more about \"Needle Breast Biopsy: About This Test.\"  Current as of: May 12, 2017  Content Version: 11.4  © 2386-8424 [employer]. Care instructions adapted under license by [employer] (which disclaims liability or warranty for this information). If you have questions about a medical condition or this instruction, always ask your healthcare professional. [place] [address] any warranty or liability for your use of this information.

## 2017-12-20 NOTE — COMMUNICATION BODY
HISTORY OF PRESENT ILLNESS  Bevely Monday is a 80 y.o. female. HPI  NEW patient consult referred by Dr. Victorina Carrillo for LEFT breast mass. Patient does not feel a breast lump. Denies skin changes or nipple discharge/retraction. Patient has been diagnosed with ovarian cancer and has had some elevated CA 27 29. Genetic testing pending. She is currently getting chemotherapy. History of benign biopsy to RIGHT breast at [facility] in 2014. FH:  Niece had ovarian cancer. She is a survivor and BRCA neg. Mammogram/ultrasound, 10/19/17, BIRADS 4  Addendum   Melida Cheeks, MD Jen Leyden Nov 3, 2017 10:36:39 AM EDT   Addendum: Addendum:     The current study is compared to a prior study performed 3/2/2017 as well as a  study performed 2/24/2015. The bilobed structure in the upper outer quadrant of  the left breast was not present on the 2015 study, but was present on the March 2017 study. However, it appears to have increased in size from 6.1 to 7.5 mm. A  clip in the medial aspect of the right breast was present on that study. Biopsy of this structure is recommended because of the patient's high risk  status. This result was verbally relayed to [facility] in Dr. Bruce Dumont office at 1036  hours. BI-RADS Category 4: Suspicious: Biopsy recommended   Study Result   STUDY:  Bilateral Diagnostic Digital Mammography and Left breast ultrasound     INDICATION: Carcinomatosis of unknown primary. Very high CA 27-29 level. Concern  for primary breast malignancy.     COMPARISON:  None. Outside films in [address] [place] [address] Street: Scattered     FINDINGS:      Bilateral digital diagnostic mammography was performed, and is interpreted in  conjunction with a computer assisted detection (CAD) system. There is a bilobed  7.4 mm nodular structure located 4.8 cm from the nipple in the upper outer  quadrant of the left breast, middle one third.   A biopsy clip is also noted in  the left breast at the 9:00 position, middle one third.     Left breast ultrasound     Targeted ultrasound was performed at the 1:00 position of the left breast, 2 cm  from the nipple. There is a 5.7 x 6.8 x 4.4 mm not structure with increased  through transmission that may represent a bilobed cyst or lymph node. No  abnormal solid mass is identified.     IMPRESSION  IMPRESSION:     1. No mammographic evidence for malignancy  2. The prior films will be obtained and compared and an addendum will be issued. 3. The patient has been notified of these results and recommendations. 4. BI-RADS Category 0: Incomplete: Old mammograms dated for comparison.           Past Medical History:   Diagnosis Date    [facility] ([facility]     Cancer ([facility] [facility]     OVARIAN     Chronic kidney disease     GERD (gastroesophageal reflux disease)     Hypertension     Murmur      Past Surgical History:   Procedure Laterality Date    HX BREAST BIOPSY Left     3 yrs ago----neg    HX HYSTERECTOMY      HX OTHER SURGICAL      Parathyroidectomy      Social History     Social History    Marital status: UNKNOWN     Spouse name: N/A    Number of children: N/A    Years of education: N/A     Occupational History    Not on file. Social History Main Topics    Smoking status: Never Smoker    Smokeless tobacco: Never Used    Alcohol use No    Drug use: No    Sexual activity: Not on file     Other Topics Concern    Not on file     Social History Narrative     OB History      Para Term  AB Living    2         SAB TAB Ectopic Molar Multiple Live Births                 Obstetric Comments    Menarche:  15   LMP: 46.  # of Children: 2 . Age at Delivery of First Child:  21.   Hysterectomy/oophorectomy:  YES/NO. Breast Bx:  YES. Hx of Breast Feeding:  YES.  BCP:  YES. Hormone therapy:  YES. Current Outpatient Prescriptions:     aspirin 81 mg chewable tablet, Take 81 mg by mouth daily. , Disp: , Rfl:     amLODIPine (NORVASC) 2.5 mg tablet, Take 2.5 mg by mouth daily., Disp: , Rfl:     pantoprazole (PROTONIX) 40 mg tablet, Take 40 mg by mouth daily. , Disp: , Rfl:     potassium chloride SR (KLOR-CON 10) 10 mEq tablet, Take 10 mEq by mouth daily. , Disp: , Rfl:     ezetimibe-simvastatin (VYTORIN 10/10) 10-10 mg per tablet, Take 1 Tab by mouth nightly., Disp: , Rfl:     magnesium oxide (MAG-OX) 400 mg tablet, Take 400 mg by mouth daily. , Disp: , Rfl:     omega-3 fatty acids-vitamin e (FISH OIL) 1,000 mg cap, Take 1 Cap by mouth daily. , Disp: , Rfl:   Allergies   Allergen Reactions    Valsartan Other (comments)     malaise         Review of Systems   Constitutional: Positive for weight loss. HENT: Negative. Eyes: Negative. Respiratory: Negative. Cardiovascular: Negative. Gastrointestinal: Negative. Genitourinary: Negative. Musculoskeletal: Positive for joint pain. Skin: Negative. Neurological: Negative. Endo/Heme/Allergies: Negative. Psychiatric/Behavioral: Negative. Physical Exam   Constitutional: She appears well-developed and well-nourished. HENT:   Head: Normocephalic. Eyes: EOM are normal.   Neck: Neck supple. Cardiovascular: Intact distal pulses. Pulmonary/Chest: Effort normal. Right breast exhibits no inverted nipple, no mass, no nipple discharge, no skin change and no tenderness. Left breast exhibits no inverted nipple, no mass, no nipple discharge, no skin change and no tenderness. Breasts are symmetrical.   Abdominal: Soft. Musculoskeletal: Normal range of motion. Lymphadenopathy:     She has no cervical adenopathy. She has no axillary adenopathy. Neurological: She is alert. Skin: Skin is warm and dry. Psychiatric: She has a normal mood and affect. Vitals reviewed. US - Guided Core Biopsy  Indication : Palpable mass Left  breast 1:00 2 cfn   Ultrasound Findings: 7 mm lobulated mass, anechoic no shadowing. Prep : alcohol. Anesthesia : 1% lidocaine with epinephrine, 10 cc. Device :  The [employer] Marquee device was advanced through the lesion and captured tissue with real-time Ultrasound Confirmation. Core Sampling :  3 cores were obtained. Lesion appeared to resolve after first pass of the needle. Marker: hydromark  Dressing : Steristrips, gauze and tape. Instructions : Remove gauze this evening. Remove steristrips in one week. [address] [place] [address]  OFFICE PROCEDURE PROGRESS NOTE        Chart reviewed for the following:   Sujey Amezquita MD, have reviewed the History, Physical and updated the Allergic reactions for [address] [place] [address] performed immediately prior to start of procedure:   Sujey Amezquita MD, have performed the following reviews on [facility] prior to the start of the procedure:            * Patient was identified by name and date of birth   * Agreement on procedure being performed was verified  * Risks and Benefits explained to the patient  * Procedure site verified and marked as necessary  * Patient was positioned for comfort  * Consent was signed and verified     Time: 10:30 am      Date of procedure: 12/20/2017    Procedure performed by:  Afua Jimenez MD    Provider assisted by: Raheel Guevara RN    Patient assisted by: aguilar    How tolerated by patient: tolerated the procedure well with no complications    Post Procedural Pain Scale: 0 - No Hurt    Comments: none          ASSESSMENT and PLAN    ICD-10-CM ICD-9-CM    1. Left breast mass N63.20 611.72 SURGICAL PATHOLOGY     81 yo female with ovarian ca and birad 4 left breast mass. She tolerated core needle biopsy well. I will call aguilar with results.

## 2017-12-20 NOTE — LETTER
12/20/2017 12:13 PM 
 
Patient:  Wily Redman YOB: 1927 Date of Visit: 12/20/2017 Dear Héctor Diaz MD 
[address] [facility] 7 [id number] VIA In Basket 
 : Thank you for referring Ms. Wily Redman to me for evaluation/treatment. Below are the relevant portions of my assessment and plan of care. HISTORY OF PRESENT ILLNESS Wily Redman is a 80 y.o. female. HPI  NEW patient consult referred by Dr. Ariana Vallejo for LEFT breast mass. Patient does not feel a breast lump. Denies skin changes or nipple discharge/retraction. Patient has been diagnosed with ovarian cancer and has had some elevated CA 27 29. Genetic testing pending. She is currently getting chemotherapy. History of benign biopsy to RIGHT breast at [facility] in 2014. FH:  Niece had ovarian cancer. She is a survivor and BRCA neg. Mammogram/ultrasound, 10/19/17, BIRADS 4 Addendum Kinza Rosas MD   [facility] Nov 3, 2017 10:36:39 AM EDT Addendum: Addendum: 
  
The current study is compared to a prior study performed 3/2/2017 as well as a 
study performed 2/24/2015. The bilobed structure in the upper outer quadrant of 
the left breast was not present on the 2015 study, but was present on the March 2017 study. However, it appears to have increased in size from 6.1 to 7.5 mm. A 
clip in the medial aspect of the right breast was present on that study. Biopsy of this structure is recommended because of the patient's high risk 
status. This result was verbally relayed to Dorie Soulier in Dr. Julianne Reid office at 1036 
hours. BI-RADS Category 4: Suspicious: Biopsy recommended Study Result STUDY:  Bilateral Diagnostic Digital Mammography and Left breast ultrasound 
  INDICATION: Carcinomatosis of unknown primary. Very high CA 27-29 level. Concern 
for primary breast malignancy. 
  
COMPARISON:  None.  Outside films in [facility]: Scattered 
  
FINDINGS:  
  
 Bilateral digital diagnostic mammography was performed, and is interpreted in 
conjunction with a computer assisted detection (CAD) system. There is a bilobed 7.4 mm nodular structure located 4.8 cm from the nipple in the upper outer 
quadrant of the left breast, middle one third. A biopsy clip is also noted in 
the left breast at the 9:00 position, middle one third. 
  
Left breast ultrasound 
  
Targeted ultrasound was performed at the 1:00 position of the left breast, 2 cm 
from the nipple. There is a 5.7 x 6.8 x 4.4 mm not structure with increased 
through transmission that may represent a bilobed cyst or lymph node. No 
abnormal solid mass is identified. 
  
IMPRESSION IMPRESSION: 
  
1. No mammographic evidence for malignancy 2. The prior films will be obtained and compared and an addendum will be issued. 3. The patient has been notified of these results and recommendations. 4. BI-RADS Category 0: Incomplete: Old mammograms dated for comparison. 
  
   
 
Past Medical History:  
Diagnosis Date  Autoimmune disease ([facility] [address]  Cancer ([facility] [address] OVARIAN   
 Chronic kidney disease  GERD (gastroesophageal reflux disease)  Hypertension  Murmur Past Surgical History:  
Procedure Laterality Date  HX BREAST BIOPSY Left   
 3 yrs ago----neg  HX HYSTERECTOMY  HX OTHER SURGICAL Parathyroidectomy Social History Social History  Marital status: UNKNOWN Spouse name: N/A  
 Number of children: N/A  
 Years of education: N/A Occupational History  Not on file. Social History Main Topics  Smoking status: Never Smoker  Smokeless tobacco: Never Used  Alcohol use No  
 Drug use: No  
 Sexual activity: Not on file Other Topics Concern  Not on file Social History Narrative OB History  Para Term  AB Living  
 2 SAB TAB Ectopic Molar Multiple Live Births Obstetric Comments Menarche:  15   LMP: 46.  # of Children: 2 . Age at Delivery of First Child:  21.   Hysterectomy/oophorectomy:  YES/NO. Breast Bx:  YES. Hx of Breast Feeding:  YES.  BCP:  YES. Hormone therapy:  YES. Current Outpatient Prescriptions:  
  aspirin 81 mg chewable tablet, Take 81 mg by mouth daily. , Disp: , Rfl:  
  amLODIPine (NORVASC) 2.5 mg tablet, Take 2.5 mg by mouth daily. , Disp: , Rfl:  
  pantoprazole (PROTONIX) 40 mg tablet, Take 40 mg by mouth daily. , Disp: , Rfl:  
  potassium chloride SR (KLOR-CON 10) 10 mEq tablet, Take 10 mEq by mouth daily. , Disp: , Rfl:  
  ezetimibe-simvastatin (VYTORIN 10/10) 10-10 mg per tablet, Take 1 Tab by mouth nightly., Disp: , Rfl:  
  magnesium oxide (MAG-OX) 400 mg tablet, Take 400 mg by mouth daily. , Disp: , Rfl:  
  omega-3 fatty acids-vitamin e (FISH OIL) 1,000 mg cap, Take 1 Cap by mouth daily. , Disp: , Rfl:  
Allergies Allergen Reactions  Valsartan Other (comments)  
  malaise Review of Systems Constitutional: Positive for weight loss. HENT: Negative. Eyes: Negative. Respiratory: Negative. Cardiovascular: Negative. Gastrointestinal: Negative. Genitourinary: Negative. Musculoskeletal: Positive for joint pain. Skin: Negative. Neurological: Negative. Endo/Heme/Allergies: Negative. Psychiatric/Behavioral: Negative. Physical Exam  
Constitutional: She appears well-developed and well-nourished. HENT:  
Head: Normocephalic. Eyes: EOM are normal.  
Neck: Neck supple. Cardiovascular: Intact distal pulses. Pulmonary/Chest: Effort normal. Right breast exhibits no inverted nipple, no mass, no nipple discharge, no skin change and no tenderness. Left breast exhibits no inverted nipple, no mass, no nipple discharge, no skin change and no tenderness. Breasts are symmetrical.  
Abdominal: Soft. Musculoskeletal: Normal range of motion. Lymphadenopathy:  
  She has no cervical adenopathy. She has no axillary adenopathy. Neurological: She is alert. Skin: Skin is warm and dry. Psychiatric: She has a normal mood and affect. Vitals reviewed. US - Guided Core Biopsy Indication : Palpable mass Left  breast 1:00 2 cfn  
Ultrasound Findings: 7 mm lobulated mass, anechoic no shadowing. Prep : alcohol. Anesthesia : 1% lidocaine with epinephrine, 10 cc. Device : The [employer] device was advanced through the lesion and captured tissue with real-time Ultrasound Confirmation. Core Sampling :  3 cores were obtained. Lesion appeared to resolve after first pass of the needle. Marker: hydromark Dressing : Steristrips, gauze and tape. Instructions : Remove gauze this evening. Remove steristrips in one week. [address] [place] [address] 
OFFICE PROCEDURE PROGRESS NOTE Chart reviewed for the following: 
 Conrad Patel MD, have reviewed the History, Physical and updated the Allergic reactions for [place] TIME OUT performed immediately prior to start of procedure: 
 Conrad Patel MD, have performed the following reviews on [place] prior to the start of the procedure: 
         
* Patient was identified by name and date of birth * Agreement on procedure being performed was verified * Risks and Benefits explained to the patient * Procedure site verified and marked as necessary * Patient was positioned for comfort * Consent was signed and verified Time: 10:30 am 
 
 
Date of procedure: 12/20/2017 Procedure performed by:  Shahid Edwards MD 
 
Provider assisted by: Liliana Goodman RN Patient assisted by: aguilar How tolerated by patient: tolerated the procedure well with no complications Post Procedural Pain Scale: 0 - No Hurt Comments: none ASSESSMENT and PLAN 
  ICD-10-CM ICD-9-CM 1. Left breast mass N63.20 611.72 SURGICAL PATHOLOGY 81 yo female with ovarian ca and birad 4 left breast mass.  She tolerated core needle biopsy well. I will call grandson with results. If you have questions, please do not hesitate to call me. I look forward to following Ms. Paul Rico along with you. Sincerely, Joseph Teresa MD

## 2017-12-20 NOTE — PROGRESS NOTES
Called  lab and spoke to Anai Sanabria, for a specimen  at [facility] office.

## 2018-01-19 ENCOUNTER — HOSPITAL ENCOUNTER (OUTPATIENT)
Dept: CT IMAGING | Age: 83
End: 2018-01-19
Attending: OBSTETRICS & GYNECOLOGY
Payer: MEDICARE

## 2018-01-19 ENCOUNTER — HOSPITAL ENCOUNTER (OUTPATIENT)
Dept: CT IMAGING | Age: 83
Discharge: HOME | End: 2018-01-19
Attending: OBSTETRICS & GYNECOLOGY
Payer: MEDICARE

## 2018-01-19 DIAGNOSIS — R18.8 ASCITES: ICD-10-CM

## 2018-01-19 DIAGNOSIS — J90 PLEURAL EFFUSION: ICD-10-CM

## 2018-01-19 LAB — CREAT BLD-MCNC: 0.9 MG/DL (ref 0.6–1.3)

## 2018-01-19 PROCEDURE — 74011636320 HC RX REV CODE- 636/320: Performed by: OBSTETRICS & GYNECOLOGY

## 2018-01-19 PROCEDURE — 82565 ASSAY OF CREATININE: CPT

## 2018-01-19 PROCEDURE — 74011000255 HC RX REV CODE- 255: Performed by: OBSTETRICS & GYNECOLOGY

## 2018-01-19 PROCEDURE — 74177 CT ABD & PELVIS W/CONTRAST: CPT

## 2018-01-19 PROCEDURE — 74011250636 HC RX REV CODE- 250/636: Performed by: OBSTETRICS & GYNECOLOGY

## 2018-01-19 PROCEDURE — 71260 CT THORAX DX C+: CPT

## 2018-01-19 RX ORDER — SODIUM CHLORIDE 9 MG/ML
50 INJECTION, SOLUTION INTRAVENOUS
Status: COMPLETED | OUTPATIENT
Start: 2018-01-19 | End: 2018-01-19

## 2018-01-19 RX ORDER — SODIUM CHLORIDE 0.9 % (FLUSH) 0.9 %
10 SYRINGE (ML) INJECTION
Status: COMPLETED | OUTPATIENT
Start: 2018-01-19 | End: 2018-01-19

## 2018-01-19 RX ORDER — BARIUM SULFATE 20 MG/ML
900 SUSPENSION ORAL
Status: COMPLETED | OUTPATIENT
Start: 2018-01-19 | End: 2018-01-19

## 2018-01-19 RX ADMIN — SODIUM CHLORIDE 50 ML/HR: 900 INJECTION, SOLUTION INTRAVENOUS at 14:20

## 2018-01-19 RX ADMIN — BARIUM SULFATE 900 ML: 21 SUSPENSION ORAL at 14:20

## 2018-01-19 RX ADMIN — IOPAMIDOL 100 ML: 755 INJECTION, SOLUTION INTRAVENOUS at 14:20

## 2018-01-19 RX ADMIN — Medication 10 ML: at 14:20

## 2018-02-21 ENCOUNTER — HOSPITAL ENCOUNTER (OUTPATIENT)
Dept: PREADMISSION TESTING | Age: 83
Discharge: HOME | End: 2018-02-21
Payer: MEDICARE

## 2018-02-21 VITALS
HEIGHT: 62 IN | RESPIRATION RATE: 18 BRPM | BODY MASS INDEX: 31.6 KG/M2 | TEMPERATURE: 97.9 F | SYSTOLIC BLOOD PRESSURE: 159 MMHG | HEART RATE: 18 BPM | DIASTOLIC BLOOD PRESSURE: 83 MMHG | WEIGHT: 171.74 LBS

## 2018-02-21 LAB
ABO + RH BLD: NORMAL
APPEARANCE UR: CLEAR
BACTERIA URNS QL MICRO: NEGATIVE /HPF
BILIRUB UR QL: NEGATIVE
BLOOD GROUP ANTIBODIES SERPL: NORMAL
COLOR UR: ABNORMAL
EPITH CASTS URNS QL MICRO: ABNORMAL /LPF
GLUCOSE UR STRIP.AUTO-MCNC: NEGATIVE MG/DL
HGB UR QL STRIP: NEGATIVE
KETONES UR QL STRIP.AUTO: NEGATIVE MG/DL
LEUKOCYTE ESTERASE UR QL STRIP.AUTO: NEGATIVE
MUCOUS THREADS URNS QL MICRO: ABNORMAL /LPF
NITRITE UR QL STRIP.AUTO: NEGATIVE
PH UR STRIP: 6.5 [PH] (ref 5–8)
PROT UR STRIP-MCNC: NEGATIVE MG/DL
RBC #/AREA URNS HPF: ABNORMAL /HPF (ref 0–5)
SP GR UR REFRACTOMETRY: 1.02 (ref 1–1.03)
SPECIMEN EXP DATE BLD: NORMAL
UROBILINOGEN UR QL STRIP.AUTO: 1 EU/DL (ref 0.2–1)
WBC URNS QL MICRO: ABNORMAL /HPF (ref 0–4)

## 2018-02-21 PROCEDURE — 86900 BLOOD TYPING SEROLOGIC ABO: CPT | Performed by: OBSTETRICS & GYNECOLOGY

## 2018-02-21 PROCEDURE — 81001 URINALYSIS AUTO W/SCOPE: CPT | Performed by: OBSTETRICS & GYNECOLOGY

## 2018-02-21 PROCEDURE — 36415 COLL VENOUS BLD VENIPUNCTURE: CPT | Performed by: OBSTETRICS & GYNECOLOGY

## 2018-02-21 PROCEDURE — 87086 URINE CULTURE/COLONY COUNT: CPT | Performed by: OBSTETRICS & GYNECOLOGY

## 2018-02-21 PROCEDURE — 93005 ELECTROCARDIOGRAM TRACING: CPT

## 2018-02-21 RX ORDER — CHOLECALCIFEROL (VITAMIN D3) 125 MCG
1 CAPSULE ORAL DAILY
COMMUNITY

## 2018-02-21 RX ORDER — HYDROXYZINE 25 MG/1
25 TABLET, FILM COATED ORAL
COMMUNITY

## 2018-02-21 RX ORDER — ATORVASTATIN CALCIUM 20 MG/1
20 TABLET, FILM COATED ORAL DAILY
COMMUNITY

## 2018-02-21 RX ORDER — SERTRALINE HYDROCHLORIDE 50 MG/1
50 TABLET, FILM COATED ORAL DAILY
COMMUNITY

## 2018-02-21 NOTE — PERIOP NOTES
[facility]  Preoperative Instructions        Surgery Date 02/27/18          Time of Arrival 0630    1. On the day of your surgery, please report to the Surgical Services Registration Desk and sign in at your designated time. The Surgery Center is located to the right of the Emergency Room. 2. You must have someone with you to drive you home. You should not drive a car for 24 hours following surgery. Please make arrangements for a friend or family member to stay with you for the first 24 hours after your surgery. 3. Do not have anything to eat or drink (including water, gum, mints, coffee, juice) after midnight ?02/26/18? Margi Mariangel ? This may not apply to medications prescribed by your physician. ?(Please note below the special instructions with medications to take the morning of your procedure.)    4. We recommend you do not drink any alcoholic beverages for 24 hours before and after your surgery. 5. Contact your surgeons office for instructions on the following medications: non-steroidal anti-inflammatory drugs (i.e. Advil, Aleve), vitamins, and supplements. (Some surgeons will want you to stop these medications prior to surgery and others may allow you to take them)  **If you are currently taking Plavix, Coumadin, Aspirin and/or other blood-thinning agents, contact your surgeon for instructions. ** Your surgeon will partner with the physician prescribing these medications to determine if it is safe to stop or if you need to continue taking. Please do not stop taking these medications without instructions from your surgeon    6. Wear comfortable clothes. Wear glasses instead of contacts. Do not bring any money or jewelry. Please bring picture ID, insurance card, and any prearranged co-payment or hospital payment. Do not wear make-up, particularly mascara the morning of your surgery. Do not wear nail polish, particularly if you are having foot /hand surgery.   Wear your hair loose or down, no ponytails, buns, rissa pins or clips. All body piercings must be removed. Please shower with antibacterial soap for three consecutive days before and on the morning of surgery, but do not apply any lotions, powders or deodorants after the shower on the day of surgery. Please use a fresh towels after each shower. Please sleep in clean clothes and change bed linens the night before surgery. Please do not shave for 48 hours prior to surgery. Shaving of the face is acceptable. 7. You should understand that if you do not follow these instructions your surgery may be cancelled. If your physical condition changes (I.e. fever, cold or flu) please contact your surgeon as soon as possible. 8. It is important that you be on time. If a situation occurs where you may be late, please call [phone] (OR Holding Area). 9. If you have any questions and or problems, please call [phone] (Pre-admission Testing). 10. Your surgery time may be subject to change. You will receive a phone call the evening prior if your time changes. 11.  If having outpatient surgery, you must have someone to drive you here, stay with you during the duration of your stay, and to drive you home at time of discharge. 12.   In an effort to improve the efficiency, privacy, and safety for all of our Pre-op patients visitors are not allowed in the Holding area. Once you arrive and are registered your family/visitors will be asked to remain in the waiting room. The Pre-op staff will get you from the Surgical Waiting Area and will explain to you and your family/visitors that the Pre-op phase is beginning. The staff will answer any questions and provide instructions for tracking of the patient, by use of the existing tracking number and color-coded status board in the waiting room.   At this time the staff will also ask for your designated spokesperson information in the event that the physician or staff need to provide an update or obtain any pertinent information. The designated spokesperson will be notified if the physician needs to speak to family during the pre-operative phase. If at any time your family/visitors has questions or concerns they may approach the volunteer desk in the waiting area for assistance. Special Instructions:    MEDICATIONS TO TAKE THE MORNING OF SURGERY WITH A SIP OF WATER: amlodipine and protonix      I understand a pre-operative phone call will be made to verify my surgery time. In the event that I am not available, I give permission for a message to be left on my answering service and/or with another person?   {yes [phone]     ___________________      __________   _________    (Signature of Patient)             (Witness)                (Date and Time)

## 2018-02-22 LAB
ATRIAL RATE: 74 BPM
CALCULATED P AXIS, ECG09: 27 DEGREES
CALCULATED R AXIS, ECG10: -5 DEGREES
CALCULATED T AXIS, ECG11: 41 DEGREES
DIAGNOSIS, 93000: NORMAL
P-R INTERVAL, ECG05: 146 MS
Q-T INTERVAL, ECG07: 390 MS
QRS DURATION, ECG06: 74 MS
QTC CALCULATION (BEZET), ECG08: 432 MS
VENTRICULAR RATE, ECG03: 74 BPM

## 2018-02-23 LAB
BACTERIA SPEC CULT: NORMAL
CC UR VC: NORMAL
SERVICE CMNT-IMP: NORMAL

## 2018-02-27 ENCOUNTER — HOSPITAL ENCOUNTER (OUTPATIENT)
Age: 83
Setting detail: OBSERVATION
Discharge: HOME | End: 2018-02-28
Attending: OBSTETRICS & GYNECOLOGY | Admitting: OBSTETRICS & GYNECOLOGY
Payer: MEDICARE

## 2018-02-27 ENCOUNTER — ANESTHESIA (OUTPATIENT)
Dept: SURGERY | Age: 83
End: 2018-02-27
Payer: MEDICARE

## 2018-02-27 ENCOUNTER — ANESTHESIA EVENT (OUTPATIENT)
Dept: SURGERY | Age: 83
End: 2018-02-27
Payer: MEDICARE

## 2018-02-27 DIAGNOSIS — C56.9 MALIGNANT NEOPLASM OF OVARY, UNSPECIFIED LATERALITY (HCC): Primary | ICD-10-CM

## 2018-02-27 PROCEDURE — 77030035029 HC NDL INSUF VERES DISP COVD -B: Performed by: OBSTETRICS & GYNECOLOGY

## 2018-02-27 PROCEDURE — 77030026438 HC STYL ET INTUB CARD -A: Performed by: NURSE ANESTHETIST, CERTIFIED REGISTERED

## 2018-02-27 PROCEDURE — 77030035277 HC OBTRTR BLDELSS DISP INTU -B: Performed by: OBSTETRICS & GYNECOLOGY

## 2018-02-27 PROCEDURE — 74011000250 HC RX REV CODE- 250: Performed by: OBSTETRICS & GYNECOLOGY

## 2018-02-27 PROCEDURE — 77030008684 HC TU ET CUF COVD -B: Performed by: NURSE ANESTHETIST, CERTIFIED REGISTERED

## 2018-02-27 PROCEDURE — 74011250636 HC RX REV CODE- 250/636

## 2018-02-27 PROCEDURE — 77030008771 HC TU NG SALEM SUMP -A: Performed by: NURSE ANESTHETIST, CERTIFIED REGISTERED

## 2018-02-27 PROCEDURE — 77030020782 HC GWN BAIR PAWS FLX 3M -B

## 2018-02-27 PROCEDURE — 76060000038 HC ANESTHESIA 3.5 TO 4 HR: Performed by: OBSTETRICS & GYNECOLOGY

## 2018-02-27 PROCEDURE — 77010033678 HC OXYGEN DAILY

## 2018-02-27 PROCEDURE — P9045 ALBUMIN (HUMAN), 5%, 250 ML: HCPCS

## 2018-02-27 PROCEDURE — 77030016151 HC PROTCTR LNS DFOG COVD -B: Performed by: OBSTETRICS & GYNECOLOGY

## 2018-02-27 PROCEDURE — 77030037892: Performed by: OBSTETRICS & GYNECOLOGY

## 2018-02-27 PROCEDURE — 74011000250 HC RX REV CODE- 250

## 2018-02-27 PROCEDURE — 77030018836 HC SOL IRR NACL ICUM -A: Performed by: OBSTETRICS & GYNECOLOGY

## 2018-02-27 PROCEDURE — 88309 TISSUE EXAM BY PATHOLOGIST: CPT | Performed by: OBSTETRICS & GYNECOLOGY

## 2018-02-27 PROCEDURE — 77030032490 HC SLV COMPR SCD KNE COVD -B: Performed by: OBSTETRICS & GYNECOLOGY

## 2018-02-27 PROCEDURE — 99218 HC RM OBSERVATION: CPT

## 2018-02-27 PROCEDURE — 77030034849: Performed by: OBSTETRICS & GYNECOLOGY

## 2018-02-27 PROCEDURE — 77030009852 HC PCH RTVR ENDOSC COVD -B: Performed by: OBSTETRICS & GYNECOLOGY

## 2018-02-27 PROCEDURE — 88360 TUMOR IMMUNOHISTOCHEM/MANUAL: CPT | Performed by: OBSTETRICS & GYNECOLOGY

## 2018-02-27 PROCEDURE — 77030020703 HC SEAL CANN DISP INTU -B: Performed by: OBSTETRICS & GYNECOLOGY

## 2018-02-27 PROCEDURE — 77030011640 HC PAD GRND REM COVD -A: Performed by: OBSTETRICS & GYNECOLOGY

## 2018-02-27 PROCEDURE — 74011250637 HC RX REV CODE- 250/637: Performed by: OBSTETRICS & GYNECOLOGY

## 2018-02-27 PROCEDURE — 77030032060 HC PWDR HEMSTAT ARISTA ASRB 3GM BARD -C: Performed by: OBSTETRICS & GYNECOLOGY

## 2018-02-27 PROCEDURE — 77030029357 HC DEV CLSR FAC SYS EFX TELE -C: Performed by: OBSTETRICS & GYNECOLOGY

## 2018-02-27 PROCEDURE — 88307 TISSUE EXAM BY PATHOLOGIST: CPT | Performed by: OBSTETRICS & GYNECOLOGY

## 2018-02-27 PROCEDURE — 76010000879 HC OR TIME 3.5 TO 4HR INTENSV - TIER 2: Performed by: OBSTETRICS & GYNECOLOGY

## 2018-02-27 PROCEDURE — 77030007956 HC PCH ENDOSC SPEC COVD -C: Performed by: OBSTETRICS & GYNECOLOGY

## 2018-02-27 PROCEDURE — 77030002933 HC SUT MCRYL J&J -A: Performed by: OBSTETRICS & GYNECOLOGY

## 2018-02-27 PROCEDURE — 77030031492 HC PRT ACC BLNT AIRSEAL CNMD -B: Performed by: OBSTETRICS & GYNECOLOGY

## 2018-02-27 PROCEDURE — 74011250636 HC RX REV CODE- 250/636: Performed by: ANESTHESIOLOGY

## 2018-02-27 PROCEDURE — 74011258636 HC RX REV CODE- 258/636: Performed by: OBSTETRICS & GYNECOLOGY

## 2018-02-27 PROCEDURE — 76210000001 HC OR PH I REC 2.5 TO 3 HR: Performed by: OBSTETRICS & GYNECOLOGY

## 2018-02-27 PROCEDURE — 88305 TISSUE EXAM BY PATHOLOGIST: CPT | Performed by: OBSTETRICS & GYNECOLOGY

## 2018-02-27 PROCEDURE — 77030013079 HC BLNKT BAIR HGGR 3M -A: Performed by: NURSE ANESTHETIST, CERTIFIED REGISTERED

## 2018-02-27 PROCEDURE — 74011250636 HC RX REV CODE- 250/636: Performed by: OBSTETRICS & GYNECOLOGY

## 2018-02-27 PROCEDURE — 74011250637 HC RX REV CODE- 250/637: Performed by: ANESTHESIOLOGY

## 2018-02-27 PROCEDURE — 77030008756 HC TU IRR SUC STRY -B: Performed by: OBSTETRICS & GYNECOLOGY

## 2018-02-27 RX ORDER — HYDROXYZINE 25 MG/1
25 TABLET, FILM COATED ORAL
Status: DISCONTINUED | OUTPATIENT
Start: 2018-02-27 | End: 2018-02-28 | Stop reason: HOSPADM

## 2018-02-27 RX ORDER — FENTANYL CITRATE 50 UG/ML
25 INJECTION, SOLUTION INTRAMUSCULAR; INTRAVENOUS
Status: DISCONTINUED | OUTPATIENT
Start: 2018-02-27 | End: 2018-02-27 | Stop reason: HOSPADM

## 2018-02-27 RX ORDER — DEXTROSE, SODIUM CHLORIDE, SODIUM LACTATE, POTASSIUM CHLORIDE, AND CALCIUM CHLORIDE 5; .6; .31; .03; .02 G/100ML; G/100ML; G/100ML; G/100ML; G/100ML
100 INJECTION, SOLUTION INTRAVENOUS CONTINUOUS
Status: DISCONTINUED | OUTPATIENT
Start: 2018-02-27 | End: 2018-02-28 | Stop reason: HOSPADM

## 2018-02-27 RX ORDER — SODIUM CHLORIDE, SODIUM LACTATE, POTASSIUM CHLORIDE, CALCIUM CHLORIDE 600; 310; 30; 20 MG/100ML; MG/100ML; MG/100ML; MG/100ML
25 INJECTION, SOLUTION INTRAVENOUS CONTINUOUS
Status: DISCONTINUED | OUTPATIENT
Start: 2018-02-27 | End: 2018-02-27 | Stop reason: HOSPADM

## 2018-02-27 RX ORDER — ONDANSETRON 2 MG/ML
4 INJECTION INTRAMUSCULAR; INTRAVENOUS AS NEEDED
Status: DISCONTINUED | OUTPATIENT
Start: 2018-02-27 | End: 2018-02-27 | Stop reason: HOSPADM

## 2018-02-27 RX ORDER — SUCCINYLCHOLINE CHLORIDE 20 MG/ML
INJECTION INTRAMUSCULAR; INTRAVENOUS AS NEEDED
Status: DISCONTINUED | OUTPATIENT
Start: 2018-02-27 | End: 2018-02-27 | Stop reason: HOSPADM

## 2018-02-27 RX ORDER — EPHEDRINE SULFATE 50 MG/ML
INJECTION, SOLUTION INTRAVENOUS AS NEEDED
Status: DISCONTINUED | OUTPATIENT
Start: 2018-02-27 | End: 2018-02-27 | Stop reason: HOSPADM

## 2018-02-27 RX ORDER — SODIUM CHLORIDE, SODIUM LACTATE, POTASSIUM CHLORIDE, CALCIUM CHLORIDE 600; 310; 30; 20 MG/100ML; MG/100ML; MG/100ML; MG/100ML
25 INJECTION, SOLUTION INTRAVENOUS CONTINUOUS
Status: DISCONTINUED | OUTPATIENT
Start: 2018-02-27 | End: 2018-02-27

## 2018-02-27 RX ORDER — HYDROMORPHONE HYDROCHLORIDE 2 MG/ML
INJECTION, SOLUTION INTRAMUSCULAR; INTRAVENOUS; SUBCUTANEOUS AS NEEDED
Status: DISCONTINUED | OUTPATIENT
Start: 2018-02-27 | End: 2018-02-27 | Stop reason: HOSPADM

## 2018-02-27 RX ORDER — LIDOCAINE HYDROCHLORIDE 20 MG/ML
INJECTION, SOLUTION EPIDURAL; INFILTRATION; INTRACAUDAL; PERINEURAL AS NEEDED
Status: DISCONTINUED | OUTPATIENT
Start: 2018-02-27 | End: 2018-02-27 | Stop reason: HOSPADM

## 2018-02-27 RX ORDER — METRONIDAZOLE 500 MG/100ML
500 INJECTION, SOLUTION INTRAVENOUS ONCE
Status: COMPLETED | OUTPATIENT
Start: 2018-02-27 | End: 2018-02-27

## 2018-02-27 RX ORDER — GABAPENTIN 300 MG/1
900 CAPSULE ORAL ONCE
Status: COMPLETED | OUTPATIENT
Start: 2018-02-27 | End: 2018-02-27

## 2018-02-27 RX ORDER — NALOXONE HYDROCHLORIDE 1 MG/ML
INJECTION INTRAMUSCULAR; INTRAVENOUS; SUBCUTANEOUS
Status: COMPLETED
Start: 2018-02-27 | End: 2018-02-27

## 2018-02-27 RX ORDER — ALBUMIN HUMAN 50 G/1000ML
SOLUTION INTRAVENOUS AS NEEDED
Status: DISCONTINUED | OUTPATIENT
Start: 2018-02-27 | End: 2018-02-27 | Stop reason: HOSPADM

## 2018-02-27 RX ORDER — PROPOFOL 10 MG/ML
VIAL (ML) INTRAVENOUS
Status: DISCONTINUED | OUTPATIENT
Start: 2018-02-27 | End: 2018-02-27 | Stop reason: HOSPADM

## 2018-02-27 RX ORDER — PHENYLEPHRINE HCL IN 0.9% NACL 0.4MG/10ML
SYRINGE (ML) INTRAVENOUS AS NEEDED
Status: DISCONTINUED | OUTPATIENT
Start: 2018-02-27 | End: 2018-02-27 | Stop reason: HOSPADM

## 2018-02-27 RX ORDER — ACETAMINOPHEN 325 MG/1
650 TABLET ORAL EVERY 6 HOURS
Status: DISCONTINUED | OUTPATIENT
Start: 2018-02-27 | End: 2018-02-28 | Stop reason: HOSPADM

## 2018-02-27 RX ORDER — SODIUM CHLORIDE 0.9 % (FLUSH) 0.9 %
5-10 SYRINGE (ML) INJECTION EVERY 8 HOURS
Status: DISCONTINUED | OUTPATIENT
Start: 2018-02-27 | End: 2018-02-28 | Stop reason: HOSPADM

## 2018-02-27 RX ORDER — HYDROMORPHONE HYDROCHLORIDE 1 MG/ML
.2-.5 INJECTION, SOLUTION INTRAMUSCULAR; INTRAVENOUS; SUBCUTANEOUS
Status: DISCONTINUED | OUTPATIENT
Start: 2018-02-27 | End: 2018-02-27 | Stop reason: HOSPADM

## 2018-02-27 RX ORDER — SODIUM CHLORIDE 0.9 % (FLUSH) 0.9 %
5-10 SYRINGE (ML) INJECTION EVERY 8 HOURS
Status: DISCONTINUED | OUTPATIENT
Start: 2018-02-27 | End: 2018-02-27 | Stop reason: HOSPADM

## 2018-02-27 RX ORDER — HYDROMORPHONE HYDROCHLORIDE 2 MG/ML
INJECTION, SOLUTION INTRAMUSCULAR; INTRAVENOUS; SUBCUTANEOUS
Status: COMPLETED
Start: 2018-02-27 | End: 2018-02-27

## 2018-02-27 RX ORDER — DEXAMETHASONE SODIUM PHOSPHATE 4 MG/ML
INJECTION, SOLUTION INTRA-ARTICULAR; INTRALESIONAL; INTRAMUSCULAR; INTRAVENOUS; SOFT TISSUE AS NEEDED
Status: DISCONTINUED | OUTPATIENT
Start: 2018-02-27 | End: 2018-02-27 | Stop reason: HOSPADM

## 2018-02-27 RX ORDER — NALOXONE HYDROCHLORIDE 0.4 MG/ML
0.4 INJECTION, SOLUTION INTRAMUSCULAR; INTRAVENOUS; SUBCUTANEOUS
Status: DISCONTINUED | OUTPATIENT
Start: 2018-02-27 | End: 2018-02-28 | Stop reason: HOSPADM

## 2018-02-27 RX ORDER — ACETAMINOPHEN 500 MG
1000 TABLET ORAL ONCE
Status: COMPLETED | OUTPATIENT
Start: 2018-02-27 | End: 2018-02-27

## 2018-02-27 RX ORDER — ROCURONIUM BROMIDE 10 MG/ML
INJECTION, SOLUTION INTRAVENOUS AS NEEDED
Status: DISCONTINUED | OUTPATIENT
Start: 2018-02-27 | End: 2018-02-27 | Stop reason: HOSPADM

## 2018-02-27 RX ORDER — NALOXONE HYDROCHLORIDE 0.4 MG/ML
INJECTION, SOLUTION INTRAMUSCULAR; INTRAVENOUS; SUBCUTANEOUS
Status: COMPLETED
Start: 2018-02-27 | End: 2018-02-27

## 2018-02-27 RX ORDER — FENTANYL CITRATE 50 UG/ML
INJECTION, SOLUTION INTRAMUSCULAR; INTRAVENOUS AS NEEDED
Status: DISCONTINUED | OUTPATIENT
Start: 2018-02-27 | End: 2018-02-27 | Stop reason: HOSPADM

## 2018-02-27 RX ORDER — SODIUM CHLORIDE 0.9 % (FLUSH) 0.9 %
SYRINGE (ML) INJECTION
Status: DISPENSED
Start: 2018-02-27 | End: 2018-02-28

## 2018-02-27 RX ORDER — SCOLOPAMINE TRANSDERMAL SYSTEM 1 MG/1
1 PATCH, EXTENDED RELEASE TRANSDERMAL
Status: COMPLETED | OUTPATIENT
Start: 2018-02-27 | End: 2018-02-27

## 2018-02-27 RX ORDER — ONDANSETRON 2 MG/ML
INJECTION INTRAMUSCULAR; INTRAVENOUS AS NEEDED
Status: DISCONTINUED | OUTPATIENT
Start: 2018-02-27 | End: 2018-02-27 | Stop reason: HOSPADM

## 2018-02-27 RX ORDER — SODIUM CHLORIDE 0.9 % (FLUSH) 0.9 %
5-10 SYRINGE (ML) INJECTION AS NEEDED
Status: DISCONTINUED | OUTPATIENT
Start: 2018-02-27 | End: 2018-02-28 | Stop reason: HOSPADM

## 2018-02-27 RX ORDER — BUPIVACAINE HYDROCHLORIDE AND EPINEPHRINE 5; 5 MG/ML; UG/ML
30 INJECTION, SOLUTION EPIDURAL; INTRACAUDAL; PERINEURAL ONCE
Status: COMPLETED | OUTPATIENT
Start: 2018-02-27 | End: 2018-02-27

## 2018-02-27 RX ORDER — IBUPROFEN 400 MG/1
800 TABLET ORAL EVERY 6 HOURS
Status: DISCONTINUED | OUTPATIENT
Start: 2018-02-27 | End: 2018-02-28 | Stop reason: HOSPADM

## 2018-02-27 RX ORDER — SODIUM CHLORIDE 0.9 % (FLUSH) 0.9 %
5-10 SYRINGE (ML) INJECTION AS NEEDED
Status: DISCONTINUED | OUTPATIENT
Start: 2018-02-27 | End: 2018-02-27 | Stop reason: HOSPADM

## 2018-02-27 RX ORDER — GABAPENTIN 300 MG/1
300 CAPSULE ORAL 3 TIMES DAILY
Status: DISCONTINUED | OUTPATIENT
Start: 2018-02-27 | End: 2018-02-27

## 2018-02-27 RX ORDER — PROPOFOL 10 MG/ML
INJECTION, EMULSION INTRAVENOUS AS NEEDED
Status: DISCONTINUED | OUTPATIENT
Start: 2018-02-27 | End: 2018-02-27 | Stop reason: HOSPADM

## 2018-02-27 RX ORDER — GLYCOPYRROLATE 0.2 MG/ML
INJECTION INTRAMUSCULAR; INTRAVENOUS AS NEEDED
Status: DISCONTINUED | OUTPATIENT
Start: 2018-02-27 | End: 2018-02-27 | Stop reason: HOSPADM

## 2018-02-27 RX ORDER — PANTOPRAZOLE SODIUM 40 MG/1
40 TABLET, DELAYED RELEASE ORAL DAILY
Status: DISCONTINUED | OUTPATIENT
Start: 2018-02-28 | End: 2018-02-28 | Stop reason: HOSPADM

## 2018-02-27 RX ORDER — NEOSTIGMINE METHYLSULFATE 1 MG/ML
INJECTION INTRAVENOUS AS NEEDED
Status: DISCONTINUED | OUTPATIENT
Start: 2018-02-27 | End: 2018-02-27 | Stop reason: HOSPADM

## 2018-02-27 RX ORDER — FACIAL-BODY WIPES
10 EACH TOPICAL DAILY
Status: DISCONTINUED | OUTPATIENT
Start: 2018-02-28 | End: 2018-02-28 | Stop reason: HOSPADM

## 2018-02-27 RX ORDER — DIPHENHYDRAMINE HYDROCHLORIDE 50 MG/ML
12.5 INJECTION, SOLUTION INTRAMUSCULAR; INTRAVENOUS AS NEEDED
Status: DISCONTINUED | OUTPATIENT
Start: 2018-02-27 | End: 2018-02-27 | Stop reason: HOSPADM

## 2018-02-27 RX ORDER — MORPHINE SULFATE 10 MG/ML
2 INJECTION, SOLUTION INTRAMUSCULAR; INTRAVENOUS
Status: DISCONTINUED | OUTPATIENT
Start: 2018-02-27 | End: 2018-02-27 | Stop reason: HOSPADM

## 2018-02-27 RX ORDER — CEFAZOLIN SODIUM/WATER 2 G/20 ML
2 SYRINGE (ML) INTRAVENOUS ONCE
Status: COMPLETED | OUTPATIENT
Start: 2018-02-27 | End: 2018-02-27

## 2018-02-27 RX ORDER — AMLODIPINE BESYLATE 2.5 MG/1
2.5 TABLET ORAL DAILY
Status: DISCONTINUED | OUTPATIENT
Start: 2018-02-28 | End: 2018-02-28 | Stop reason: HOSPADM

## 2018-02-27 RX ORDER — NALOXONE HYDROCHLORIDE 0.4 MG/ML
0.4 INJECTION, SOLUTION INTRAMUSCULAR; INTRAVENOUS; SUBCUTANEOUS AS NEEDED
Status: DISCONTINUED | OUTPATIENT
Start: 2018-02-27 | End: 2018-02-28 | Stop reason: HOSPADM

## 2018-02-27 RX ORDER — ONDANSETRON 2 MG/ML
4 INJECTION INTRAMUSCULAR; INTRAVENOUS
Status: DISCONTINUED | OUTPATIENT
Start: 2018-02-27 | End: 2018-02-28 | Stop reason: HOSPADM

## 2018-02-27 RX ADMIN — DEXAMETHASONE SODIUM PHOSPHATE 4 MG: 4 INJECTION, SOLUTION INTRA-ARTICULAR; INTRALESIONAL; INTRAMUSCULAR; INTRAVENOUS; SOFT TISSUE at 09:15

## 2018-02-27 RX ADMIN — HYDROMORPHONE HYDROCHLORIDE 0.2 MG: 2 INJECTION INTRAMUSCULAR; INTRAVENOUS; SUBCUTANEOUS at 13:25

## 2018-02-27 RX ADMIN — FENTANYL CITRATE 50 MCG: 50 INJECTION, SOLUTION INTRAMUSCULAR; INTRAVENOUS at 08:52

## 2018-02-27 RX ADMIN — MEPERIDINE HYDROCHLORIDE 12.5 MG: 50 INJECTION, SOLUTION INTRAMUSCULAR; INTRAVENOUS; SUBCUTANEOUS at 13:11

## 2018-02-27 RX ADMIN — HYDROMORPHONE HYDROCHLORIDE 0.4 MG: 2 INJECTION, SOLUTION INTRAMUSCULAR; INTRAVENOUS; SUBCUTANEOUS at 09:26

## 2018-02-27 RX ADMIN — METRONIDAZOLE 500 MG: 500 INJECTION, SOLUTION INTRAVENOUS at 09:00

## 2018-02-27 RX ADMIN — ALBUMIN HUMAN 250 ML: 50 SOLUTION INTRAVENOUS at 10:30

## 2018-02-27 RX ADMIN — MEPERIDINE HYDROCHLORIDE 12.5 MG: 50 INJECTION, SOLUTION INTRAMUSCULAR; INTRAVENOUS; SUBCUTANEOUS at 13:01

## 2018-02-27 RX ADMIN — Medication 120 MCG: at 10:30

## 2018-02-27 RX ADMIN — NALOXONE HYDROCHLORIDE 0.4 MG: 0.4 INJECTION, SOLUTION INTRAMUSCULAR; INTRAVENOUS; SUBCUTANEOUS at 17:00

## 2018-02-27 RX ADMIN — Medication 2 G: at 08:39

## 2018-02-27 RX ADMIN — GABAPENTIN 900 MG: 300 CAPSULE ORAL at 07:42

## 2018-02-27 RX ADMIN — MEPERIDINE HYDROCHLORIDE 12.5 MG: 50 INJECTION, SOLUTION INTRAMUSCULAR; INTRAVENOUS; SUBCUTANEOUS at 13:16

## 2018-02-27 RX ADMIN — FENTANYL CITRATE 50 MCG: 50 INJECTION, SOLUTION INTRAMUSCULAR; INTRAVENOUS at 08:41

## 2018-02-27 RX ADMIN — Medication 40 MCG: at 10:25

## 2018-02-27 RX ADMIN — ACETAMINOPHEN 1000 MG: 500 TABLET ORAL at 07:42

## 2018-02-27 RX ADMIN — FENTANYL CITRATE 25 MCG: 50 INJECTION, SOLUTION INTRAMUSCULAR; INTRAVENOUS at 12:45

## 2018-02-27 RX ADMIN — SODIUM CHLORIDE, SODIUM LACTATE, POTASSIUM CHLORIDE, AND CALCIUM CHLORIDE 25 ML/HR: 600; 310; 30; 20 INJECTION, SOLUTION INTRAVENOUS at 07:52

## 2018-02-27 RX ADMIN — MEPERIDINE HYDROCHLORIDE 12.5 MG: 50 INJECTION, SOLUTION INTRAMUSCULAR; INTRAVENOUS; SUBCUTANEOUS at 12:56

## 2018-02-27 RX ADMIN — MEPERIDINE HYDROCHLORIDE 12.5 MG: 50 INJECTION, SOLUTION INTRAMUSCULAR; INTRAVENOUS; SUBCUTANEOUS at 12:41

## 2018-02-27 RX ADMIN — ONDANSETRON 4 MG: 2 INJECTION INTRAMUSCULAR; INTRAVENOUS at 11:54

## 2018-02-27 RX ADMIN — ALBUMIN HUMAN 250 ML: 50 SOLUTION INTRAVENOUS at 11:04

## 2018-02-27 RX ADMIN — ROCURONIUM BROMIDE 5 MG: 10 INJECTION, SOLUTION INTRAVENOUS at 08:41

## 2018-02-27 RX ADMIN — Medication 120 MCG: at 10:09

## 2018-02-27 RX ADMIN — SCOPALAMINE 1 PATCH: 1 PATCH, EXTENDED RELEASE TRANSDERMAL at 09:11

## 2018-02-27 RX ADMIN — NEOSTIGMINE METHYLSULFATE 3 MG: 1 INJECTION INTRAVENOUS at 11:53

## 2018-02-27 RX ADMIN — LIDOCAINE HYDROCHLORIDE 60 MG: 20 INJECTION, SOLUTION EPIDURAL; INFILTRATION; INTRACAUDAL; PERINEURAL at 08:41

## 2018-02-27 RX ADMIN — ROCURONIUM BROMIDE 10 MG: 10 INJECTION, SOLUTION INTRAVENOUS at 09:45

## 2018-02-27 RX ADMIN — NALOXONE HYDROCHLORIDE 0.6 MG: 1 INJECTION PARENTERAL at 14:34

## 2018-02-27 RX ADMIN — ROCURONIUM BROMIDE 35 MG: 10 INJECTION, SOLUTION INTRAVENOUS at 08:47

## 2018-02-27 RX ADMIN — MEPERIDINE HYDROCHLORIDE 12.5 MG: 50 INJECTION, SOLUTION INTRAMUSCULAR; INTRAVENOUS; SUBCUTANEOUS at 13:06

## 2018-02-27 RX ADMIN — GLYCOPYRROLATE 0.3 MG: 0.2 INJECTION INTRAMUSCULAR; INTRAVENOUS at 11:53

## 2018-02-27 RX ADMIN — NALOXONE HYDROCHLORIDE 0.4 MG: 0.4 INJECTION, SOLUTION INTRAMUSCULAR; INTRAVENOUS; SUBCUTANEOUS at 14:09

## 2018-02-27 RX ADMIN — MEPERIDINE HYDROCHLORIDE 12.5 MG: 50 INJECTION, SOLUTION INTRAMUSCULAR; INTRAVENOUS; SUBCUTANEOUS at 12:46

## 2018-02-27 RX ADMIN — Medication 150 MCG/KG/MIN: at 08:54

## 2018-02-27 RX ADMIN — SUCCINYLCHOLINE CHLORIDE 100 MG: 20 INJECTION INTRAMUSCULAR; INTRAVENOUS at 08:41

## 2018-02-27 RX ADMIN — SODIUM CHLORIDE, SODIUM LACTATE, POTASSIUM CHLORIDE, CALCIUM CHLORIDE, AND DEXTROSE MONOHYDRATE 100 ML/HR: 600; 310; 30; 20; 5 INJECTION, SOLUTION INTRAVENOUS at 13:30

## 2018-02-27 RX ADMIN — PROPOFOL 150 MG: 10 INJECTION, EMULSION INTRAVENOUS at 08:41

## 2018-02-27 RX ADMIN — Medication 120 MCG: at 10:06

## 2018-02-27 RX ADMIN — EPHEDRINE SULFATE 20 MG: 50 INJECTION, SOLUTION INTRAVENOUS at 11:11

## 2018-02-27 RX ADMIN — MEPERIDINE HYDROCHLORIDE 12.5 MG: 50 INJECTION, SOLUTION INTRAMUSCULAR; INTRAVENOUS; SUBCUTANEOUS at 12:51

## 2018-02-27 NOTE — PERIOP NOTES
Pt very sleepy, opens eyes to verbal/tactile stimulation but falls asleep again quickly. Vital signs stable, pain and shivering now controlled, no s/s of dyspnea/shortness of breath. Dr. Jose Manuel Lopez updated as to above, orders for Narcan received, this Rn remains at bedside for constant assessments.

## 2018-02-27 NOTE — ANESTHESIA POSTPROCEDURE EVALUATION
Post-Anesthesia Evaluation and Assessment    Patient: Semaj Avalos MRN: [id number]  SSN: [id number]    YOB: 1927  Age: 80 y.o. Sex: female       Cardiovascular Function/Vital Signs  Visit Vitals    /52    Pulse 71    Temp 36.4 °C (97.5 °F)    Resp 14    Ht 5' 2\" (1.575 m)    Wt 78.8 kg (173 lb 11.6 oz)    SpO2 98%    BMI 31.77 kg/m2       Patient is status post general, total IV anesthesia anesthesia for Procedure(s):  ROBOTIC BILATERAL SALPINGO-OOPHORECTOMY/ OMENTECTOMY . Nausea/Vomiting: None    Postoperative hydration reviewed and adequate. Pain:  Pain Scale 1: FLACC (02/27/18 1400)  Pain Intensity 1: 0 (02/27/18 1400)   Managed    Neurological Status:   Neuro (WDL): Exceptions to WDL (02/27/18 1228)  Neuro  Neurologic State:  (Sedated d/t procedure) (02/27/18 1228)  Orientation Level:  (sedated, will assess once more awake) (02/27/18 1228)   At baseline    Mental Status and Level of Consciousness: Arousable    Pulmonary Status:   O2 Device: Nasal cannula (02/27/18 1400)   Adequate oxygenation and airway patent    Complications related to anesthesia: None    Post-anesthesia assessment completed.  No concerns    Signed By: Mini Werner MD     February 27, 2018

## 2018-02-27 NOTE — H&P
Paper H&P updated, signed, and placed in chart. Briefly, 81 yo with ovarian cancer, s/p neoadjuvant chemotherapy with good response. Admitted for scheduled robotic BSO, omentectomy.     Lana Cordova MD

## 2018-02-27 NOTE — PROGRESS NOTES
Verbal shift change report given to Patrick Ho RN (oncoming nurse) by Amber Ferguson RN (offgoing nurse). Report included the following information SBAR, Kardex, Intake/Output, MAR and Recent Results.

## 2018-02-27 NOTE — BRIEF OP NOTE
BRIEF OPERATIVE NOTE    Date of Procedure: 2/27/2018   Preoperative Diagnosis: OVARIAN CANCER  Postoperative Diagnosis: OVARIAN CANCER    Procedure(s):  ROBOTIC BILATERAL SALPINGO-OOPHORECTOMY/ OMENTECTOMY, TOTAL OMENTECTOMY, DIAPHRAGM PERITONEAL STRIPPING, OPTIMAL INTERVAL ROBOTIC DEBULKING   Surgeon(s) and Role:     * Damián Tamayo MD - Primary         Assistant Staff: None      Surgical Staff:  Circ-1: Kush Schroeder Passut  Circ-Relief: Pawel Caceres RN  Scrub Tech-1: Emilee Guerra  Scrub Tech-Relief: Allie Jacob  Surg Asst-1: [place] Number  Surg Asst-Relief: Fabby MCKEON'Meara  Event Time In   Incision Start 0913   Incision Close 1211     Anesthesia: General   Estimated Blood Loss: 50 mL  Specimens:   ID Type Source Tests Collected by Time Destination   1 : Omental adhesion Preservative Pelvis  Damián Tamayo MD 2/27/2018 0932 Pathology   2 : Abdominal wall implant Preservative Abdomen  Damián Tamayo MD 2/27/2018 5210 Pathology   3 : Right fallopian tube and ovary Preservative Pelvis  Damián Tamayo MD 2/27/2018 5305 Pathology   4 : Left fallopian tube and ovary Preservative Pelvis  Damián Tamayo MD 2/27/2018 1002 Pathology   5 : Left Diaphragm implants Preservative Abdomen  Damián Tamayo MD 2/27/2018 1016 Pathology   6 : Right diaphragm peritoneum Preservative Abdomen  Damián Tamayo MD 2/27/2018 1020 Pathology   7 : Omentum Preservative Abdomen  Damián Tamayo MD 2/27/2018 1148 Pathology      Findings: small volume residual disease, mostly in the omentum, with minimal peritoneal disease at bilateral hemidiaphragms, and peritoneum. Ovaries were small. Prior hysterectomy. Complete resection with no residual disease.    Complications: none  Implants: * No implants in log *

## 2018-02-27 NOTE — PERIOP NOTES
Dr. Matilda Mcrae at bedside to check on patient. Vitals, sinus arrhythmia, and medications reviewed with physicain. Pt resting in bed with relaxed posture, sleeping between nursing care. Opens eyes to tactile and auditory stimulus. This RN at her bedside consistently.

## 2018-02-27 NOTE — PERIOP NOTES
Handoff Report from Operating Room to PACU    Report received from Adalberto Ganser, CRNA and CINDY Brown RN regarding Erickson Rogers. Surgeon(s):  Tory Carson MD  And Procedure(s) (LRB):  ROBOTIC BILATERAL SALPINGO-OOPHORECTOMY/ OMENTECTOMY  (N/A)  confirmed   with dressings and allergies discussed. Anesthesia type, drugs, patient history, complications, estimated blood loss, vital signs, intake and output, and last pain medication, lines and reversal medications were reviewed.

## 2018-02-27 NOTE — PERIOP NOTES
Pt becoming more responsive, denies pain currently. Follows most commands, drowsy but progressing. Attempted to call Gen/Surg nurse however RN not able to take call currently and room still being cleaned. Will continue to monitor patient, VSS.

## 2018-02-27 NOTE — PERIOP NOTES
Pt's family member, Jerrald Kehr (son), updated as to patient's status and her plan of care involving being transferred to room 2115. Family advised that room is not ready at this time and this RN will notify them once she is being transfered. All questions answered to family's satisfaction, understanding of plan lso verbalized.

## 2018-02-27 NOTE — ANESTHESIA PREPROCEDURE EVALUATION
Anesthetic History     PONV          Review of Systems / Medical History  Patient summary reviewed, nursing notes reviewed and pertinent labs reviewed    Pulmonary  Within defined limits                 Neuro/Psych         Psychiatric history     Cardiovascular    Hypertension              Exercise tolerance: >4 METS     GI/Hepatic/Renal     GERD           Endo/Other        Obesity, arthritis and cancer ( metastatic adenocarcinoma )     Other Findings   Comments: scoliosis, sciatica, spondolithysis         Physical Exam    Airway  Mallampati: II  TM Distance: 4 - 6 cm  Neck ROM: normal range of motion   Mouth opening: Normal     Cardiovascular          Murmur: Grade 1     Dental  No notable dental hx       Pulmonary  Breath sounds clear to auscultation               Abdominal  GI exam deferred       Other Findings            Anesthetic Plan    ASA: 3  Anesthesia type: general and total IV anesthesia            Anesthetic plan and risks discussed with: Patient and Family      Severe PONV hx

## 2018-02-27 NOTE — IP AVS SNAPSHOT
[address] 
[phone] Patient: Leann Weldon MRN: [id number] :1927 A check papito indicates which time of day the medication should be taken. My Medications START taking these medications Instructions Each Dose to Equal  
 Morning Noon Evening Bedtime HYDROcodone-acetaminophen 5-300 mg tablet Commonly known as:  Jose Deyvi Your last dose was: Your next dose is: Take 1 Tab by mouth every four (4) hours as needed. Max Daily Amount: 6 Tabs. 1 Tab CONTINUE taking these medications Instructions Each Dose to Equal  
 Morning Noon Evening Bedtime  
 amLODIPine 2.5 mg tablet Commonly known as:  Stacey Sumner Your last dose was: Your next dose is: Take 2.5 mg by mouth daily. 2.5 mg  
    
   
   
   
  
 aspirin 81 mg chewable tablet Your last dose was: Your next dose is: Take 81 mg by mouth daily. 81 mg  
    
   
   
   
  
 atorvastatin 20 mg tablet Commonly known as:  LIPITOR Your last dose was: Your next dose is: Take 20 mg by mouth daily. 20 mg  
    
   
   
   
  
 FISH OIL 1,000 mg Cap Generic drug:  omega-3 fatty acids-vitamin e Your last dose was: Your next dose is: Take 1 Cap by mouth daily. 1 Cap  
    
   
   
   
  
 hydrOXYzine HCl 25 mg tablet Commonly known as:  ATARAX Your last dose was: Your next dose is: Take 25 mg by mouth three (3) times daily as needed for Itching. Indications: anxiety 25 mg KLOR-CON 10 10 mEq tablet Generic drug:  potassium chloride SR Your last dose was: Your next dose is: Take 10 mEq by mouth daily. 10 mEq  
    
   
   
   
  
 magnesium oxide 400 mg tablet Commonly known as:  MAG-OX Your last dose was: Your next dose is: Take 400 mg by mouth daily. 400 mg PROTONIX 40 mg tablet Generic drug:  pantoprazole Your last dose was: Your next dose is: Take 40 mg by mouth daily. 40 mg  
    
   
   
   
  
 VITAMIN D3 2,000 unit Tab Generic drug:  cholecalciferol (vitamin D3) Your last dose was: Your next dose is: Take 1 Tab by mouth daily. 1 Tab Vytorin 10/10 10-10 mg per tablet Generic drug:  ezetimibe-simvastatin Your last dose was: Your next dose is: Take 1 Tab by mouth nightly. 1 Tab ZOLOFT 50 mg tablet Generic drug:  sertraline Your last dose was: Your next dose is: Take 50 mg by mouth daily. 50 mg Where to Get Your Medications Information on where to get these meds will be given to you by the nurse or doctor. ! Ask your nurse or doctor about these medications HYDROcodone-acetaminophen 5-300 mg tablet

## 2018-02-27 NOTE — OP NOTES
[address] REPORT    Toya Armas  MR#: [id number]  : 1927  ACCOUNT #: [de-identified]   DATE OF SERVICE: 2018    PREOPERATIVE DIAGNOSIS:  Ovarian cancer. POSTOPERATIVE DIAGNOSIS:  Ovarian cancer. PROCEDURE:  Robotic assisted bilateral salpingo-oophorectomy, total infragastric omentectomy, peritoneal stripping of the right hemidiaphragm and complete resection of any residual tumor nodules for an optimal interval debulking. SURGEON:  Yeni Shane MD    ASSISTANT:  Rosa Durham. ANESTHESIA:  General endotracheal.    ESTIMATED BLOOD LOSS:  50 mL. IV FLUIDS:  See anesthesia record. URINE OUTPUT:  See anesthesia record. INDICATIONS:  The patient is a 57-year-old female with a diagnosis of metastatic ovarian cancer. At her initial diagnosis, she had a large omental mass and massive malignant ascites. She had a great response to neoadjuvant single agent carboplatin, which was given because of goals of care due to advanced age. At this point, it was deemed reasonable that she undergo an interval debulking surgery and the small volume of disease made it reasonable to proceed with robotic assistance for this procedure. FINDINGS:  Exam under anesthesia revealed normal vulva and vagina with evidence of prior hysterectomy. There was no tumor nodularity noted in the cul-de-sac on exam.  Intraoperative findings revealed residual small volume disease in the omentum as well as miliary disease along the right hemidiaphragm, and approximately 1 cm deposit on the left hemidiaphragm. There was an isolated miliary deposit at the anterior abdominal wall. The bilateral ovaries were small and adherent to the bilateral sidewalls. She had evidence of her prior hysterectomy. The bowel was normal.  The peritoneum was smooth and all visible tumor deposits were completely resected, giving this an RO resection with no residual disease. SPECIMENS:  1.   Right ovary and fallopian tube. 2.  Left ovary and fallopian tube. 3.  Abdominal wall nodule. 4.  Left hemidiaphragm nodule. 5.  Right hemidiaphragm peritoneum. 6.  Omentum. COMPLICATIONS:  None. CONDITION:  Stable to PACU. IMPLANTS:  none    PROCEDURE:  The patient was taken to the operating room. She was placed under general anesthesia without difficulty. She was then prepared and draped in the normal sterile fashion in the dorsal lithotomy position. A Ruvalcaba catheter was placed in the bladder. A sponge stick was placed in the vagina. Attention was then turned to the abdomen where after injection of local anesthesia, an umbilical skin incision was made with a scalpel. The Veress needle was introduced and proper placement was confirmed. The abdomen was insufflated to a pressure of 15 mmHg. The Veress needle was removed. The 8 mm robotic trocar was then inserted at the umbilicus. A thorough examination of the abdomen revealed that there was minimal residual disease and that the procedure was feasible to proceed with robotically. Additional 8 mm skin incisions were made after injection of local anesthesia at the left mid abdomen, left lateral abdomen and right lateral abdomen. A 12 mm skin incision, and a 12 AirSeal assistant trocar was placed in the right mid abdomen. All these trocars were placed under direct visualization. Attention was turned initially to taking down the omental adhesion from the anterior abdominal wall and resecting a small miliary nodule from the peritoneum of the anterior abdominal wall, which was removed and handed off for pathology. On the right pelvic sidewall, there were some adhesions at the ileocecal junction to the area of the right adnexa. These adhesions were carefully taken down sharply. The peritoneum along the right pelvic sidewall was incised. The retroperitoneum was entered. The right ureter was identified.   The right ovarian vessels were then isolated protecting the ureter out of the areas of work. The ovarian vessels were then coagulated and transected with good hemostasis. The right ovary and fallopian tube were resected entirely off of the adhesions from the anterior abdominal wall, placed in an EndoCatch bag, and removed through the assistant trocar and handed off for pathology. Along the left pelvic sidewall, the sigmoid colon was adherent to the left pelvic sidewall and there was significant diverticular disease along the sigmoid colon. The sigmoid colon adhesions were then carefully and sharply taken down from the left pelvic sidewall thus revealing the ovary and fallopian tube behind it. The peritoneum along the left pelvic sidewall was incised. The retroperitoneum was entered. The left ureter was identified and the left ovarian vessels were then isolated, coagulated and transected with good hemostasis. The adnexa was then elevated. The adhesions from the adnexa to the pelvic sidewall were then carefully taken down and the left ovary and fallopian tube were placed in the EndoCatch bag and removed through the assistant's trocar and handed off for pathology. At this point, instruments were removed. The [employer] robot was undocked and the [employer] robot was redocked with a target to the upper abdomen for the remainder of the case. The instruments were reintroduced and attention was turned initially to the hemidiaphragm. On the left hemidiaphragm, there was approximately a 1 cm tumor nodule which was resected by carefully incising the peritoneum in this area, it was handed off for pathology. Along the right hemidiaphragm, there was somewhat diffuse miliary disease; therefore peritoneal stripping along the right hemidiaphragm was then carefully performed and using the robotic instruments, the liver was retracted out of the area of dissection. There was no residual peritoneal disease along the right hemidiaphragm.   At the end of this procedure the peritoneum was handed off for pathology. At this point, attention was turned to the total infragastric omentectomy. The omentum had some residual disease within it and it was elevated, the transverse colon was then easily identified. The avascular plane between the transverse colon and mesentery trunk of the transverse colon and omentum were then   developed. The lesser sac was then entered and thoroughly examined and there was no disease in the lesser sac. The tissue planes were then followed to incise the avascular plane between the omentum and the transverse colon. Some mobilization of splenic flexure was performed during this. The spleen was identified. The omentum was easily coagulated and transected off of the spleen without any bleeding. There were some adhesions along the hepatic flexure. These were easily taken down. The avascular plane again along the right side of the colon and the proximal transverse colon were taken down. The omentum was then fully elevated and just coming off of the stomach. At this point, the vessels were coagulated and transected. A total  infragastric omentectomy was performed. There was some additional cautery that was used along the short gastrics near the spleen. Adequate hemostasis here was performed. Piero was placed in this area. There was adequate hemostasis with a total blood loss of 50 mL for this procedure. At this point, the AirSeal insufflation was moved over to one of the robotic trocars, so that a 15 EndoCatch bag could be introduced through the assistant fascial incision, so the 12 trocar was removed and the bag was directly introduced into the abdomen. The omentum was completely placed into the 15 bag. The bag was brought up to the right mid abdominal incision and the omentum was then carefully removed with an intact bag and the omentum was removed intact and handed off for pathology.   Using the Weck fascial closure device, interrupted 0 Vicryl sutures were placed at the right mid abdomen fascial incision. After the robotic instruments were removed, the [employer] robot was undocked and the insufflation was removed from the abdomen. All skin incisions were closed with 4-0 Monocryl and subcuticular stitch. Sponge, lap and needle counts were correct x2. The patient tolerated the procedure well and was taken to PACU in stable condition. Carmen Junior.  MD Giselle Mendez  D: 02/27/2018 12:22     T: 02/27/2018 13:46  JOB #: [id number]  CC: Veronica Solorio MD  [address]

## 2018-02-27 NOTE — PERIOP NOTES
Dr. Kathleen Mcintyre once again at bedside for evaluation. Pt opens eyes to auditory stimulation, verbal prompting and some times tactile comfort. Pt replying in one-word answers, medications, assessments, and vitals reviewed. When asked where she was currently patient stated \"hospital\". Pt josee drowsy but denies shortness of breath or difficulty breathing. Will follow most commands bu drifts off to sleep between care. Dr. Kathleen Mcintyre at bedside in preparation of being transferred to 2115 via stretcher. Dr. Kathleen Mcintyre cleared pt for transfer to floor. TRANSFER - OUT REPORT:    [id number]: Verbal report given to Britt Magallon RN on Mohsen Sotomayor  being transferred to 2115 Gen/Surg (unit) for routine post - op       Report consisted of patients Situation, Background, Assessment and   Recommendations(SBAR). Information from the following report(s) SBAR, Kardex, OR Summary, Procedure Summary, Intake/Output, MAR and Cardiac Rhythm Sinus arrhythmia  was reviewed with the receiving nurse. Opportunity for questions and clarification was provided.       Patient transported with:   O2 @ 2 liters  Tech   Pt's belongings

## 2018-02-27 NOTE — IP AVS SNAPSHOT
[facility] 
[phone] Patient: Mohsen Sotomayor MRN: [id number] About your hospitalization You were admitted on:  2018 You last received care in the:  [facility] 2 PROGRESSIVE CARE You were discharged on:  2018 Why you were hospitalized Your primary diagnosis was:  Ovarian Cancer (Hcc) Follow-up Information Follow up With Details Comments Contact Info Roxanne Cheung MD On 3/8/2018 at 11:30 AM for post hospital follow up appointment. [address] [facility] [address] [facility] 
[phone] Krishna Vaughn MD Schedule an appointment as soon as possible for a visit in 1 week with your PCP for post hospital follow up appointment. [address] [facility] 
[phone] Discharge Orders None A check papito indicates which time of day the medication should be taken. My Medications START taking these medications Instructions Each Dose to Equal  
 Morning Noon Evening Bedtime HYDROcodone-acetaminophen 5-300 mg tablet Commonly known as:  Enio Coe Your last dose was: Your next dose is: Take 1 Tab by mouth every four (4) hours as needed. Max Daily Amount: 6 Tabs. 1 Tab CONTINUE taking these medications Instructions Each Dose to Equal  
 Morning Noon Evening Bedtime  
 amLODIPine 2.5 mg tablet Commonly known as:  Juliette Koroma Your last dose was: Your next dose is: Take 2.5 mg by mouth daily. 2.5 mg  
    
   
   
   
  
 aspirin 81 mg chewable tablet Your last dose was: Your next dose is: Take 81 mg by mouth daily. 81 mg  
    
   
   
   
  
 atorvastatin 20 mg tablet Commonly known as:  LIPITOR Your last dose was: Your next dose is: Take 20 mg by mouth daily. 20 mg FISH OIL 1,000 mg Cap Generic drug:  omega-3 fatty acids-vitamin e Your last dose was: Your next dose is: Take 1 Cap by mouth daily. 1 Cap  
    
   
   
   
  
 hydrOXYzine HCl 25 mg tablet Commonly known as:  ATARAX Your last dose was: Your next dose is: Take 25 mg by mouth three (3) times daily as needed for Itching. Indications: anxiety 25 mg KLOR-CON 10 10 mEq tablet Generic drug:  potassium chloride SR Your last dose was: Your next dose is: Take 10 mEq by mouth daily. 10 mEq  
    
   
   
   
  
 magnesium oxide 400 mg tablet Commonly known as:  MAG-OX Your last dose was: Your next dose is: Take 400 mg by mouth daily. 400 mg PROTONIX 40 mg tablet Generic drug:  pantoprazole Your last dose was: Your next dose is: Take 40 mg by mouth daily. 40 mg  
    
   
   
   
  
 VITAMIN D3 2,000 unit Tab Generic drug:  cholecalciferol (vitamin D3) Your last dose was: Your next dose is: Take 1 Tab by mouth daily. 1 Tab Vytorin 10/10 10-10 mg per tablet Generic drug:  ezetimibe-simvastatin Your last dose was: Your next dose is: Take 1 Tab by mouth nightly. 1 Tab ZOLOFT 50 mg tablet Generic drug:  sertraline Your last dose was: Your next dose is: Take 50 mg by mouth daily. 50 mg Where to Get Your Medications Information on where to get these meds will be given to you by the nurse or doctor. ! Ask your nurse or doctor about these medications HYDROcodone-acetaminophen 5-300 mg tablet Discharge Instructions No lifting >10 lbs x 6 weeks No driving for 7-52 days or until off narcotic pain meds and can hit the brakes in an emergency Ok to shower tomorrow, no baths until incisions have healed Can take off band-aids tomorrow, keep steri-strips on 7-10 days (until they start to peel off) Take motrin 800 mg every 6 hrs and tylenol 650 mg every 6 hrs (both over the counter). Take percocet additionally if needed for pain. To treat or prevent constipation take (over the counter) senna 2 tabs each night, and additionally milk of magnesia or miralax if needed. [employer] Announcement We are excited to announce that we are making your provider's discharge notes available to you in [employer]. You will see these notes when they are completed and signed by the physician that discharged you from your recent hospital stay. If you have any questions or concerns about any information you see in [employer], please call the Health Information Department where you were seen or reach out to your Primary Care Provider for more information about your plan of care. Introducing [facility]! Dear Cheron Goltz: Thank you for requesting a [employer] account. Our records indicate that you already have an active [employer] account. You can access your account anytime at [email or web address]". [employer]/"[email or web address]" Did you know that you can access your hospital and ER discharge instructions at any time in [employer]? You can also review all of your test results from your hospital stay or ER visit. Additional Information If you have questions, please visit the Frequently Asked Questions section of the [employer] website at [email or web address]/"[email or web address]"/. Remember, [employer] is NOT to be used for urgent needs. For medical emergencies, dial 911. Now available from your iPhone and Android! Providers Seen During Your Hospitalization Provider Specialty Primary office phone Lucia Scott MD Gynecologic Oncology [phone] Immunizations Administered for This Admission Name Date Influenza Vaccine (Quad) PF  Deferred () Your Primary Care Physician (PCP) Primary Care Physician Office Phone Office Fax Priti Guallpa [phone] You are allergic to the following Allergen Reactions Valsartan Other (comments)  
 malaise Recent Documentation Height Weight BMI OB Status Smoking Status 1.575 m 78.8 kg 31.77 kg/m2 Hysterectomy Never Smoker Emergency Contacts Name Discharge Info Relation Home Work Mobile Asif Carl DISCHARGE CAREGIVER [3] Son [22] [phone] [facility] DISCHARGE CAREGIVER [3] Son [22] [phone] Patient Belongings The following personal items are in your possession at time of discharge: 
  Dental Appliances: None  Visual Aid: None      Home Medications: None   Jewelry: None  Clothing: Pants, Shirt, Undergarments, Footwear, Socks, Jacket/Coat (scarf)    Other Valuables: Wallet (wallet with son ) Please provide this summary of care documentation to your next provider. Signatures-by signing, you are acknowledging that this After Visit Summary has been reviewed with you and you have received a copy. Patient Signature:  ____________________________________________________________ Date:  ____________________________________________________________  
  
Mariola Johnson Provider Signature:  ____________________________________________________________ Date:  ____________________________________________________________

## 2018-02-27 NOTE — PERIOP NOTES
Pt resting in bed with eyes closed, opens eyes to tactile/auditory stimulus. States she is feeling better, remains quite sleepy. Pt's grandson, Eugene Jacques, updated as to plan of care and pt status. Paxton, reports he is an anesthesiologist, reviewed medications with him as per his request and permission received from patient.

## 2018-02-28 VITALS
SYSTOLIC BLOOD PRESSURE: 123 MMHG | RESPIRATION RATE: 16 BRPM | HEIGHT: 62 IN | OXYGEN SATURATION: 98 % | DIASTOLIC BLOOD PRESSURE: 75 MMHG | HEART RATE: 84 BPM | WEIGHT: 173.72 LBS | TEMPERATURE: 97.3 F | BODY MASS INDEX: 31.97 KG/M2

## 2018-02-28 LAB
ALBUMIN SERPL-MCNC: 3.4 G/DL (ref 3.5–5)
ALBUMIN/GLOB SERPL: 1.2 {RATIO} (ref 1.1–2.2)
ALP SERPL-CCNC: 52 U/L (ref 45–117)
ALT SERPL-CCNC: 15 U/L (ref 12–78)
ANION GAP SERPL CALC-SCNC: 8 MMOL/L (ref 5–15)
AST SERPL-CCNC: 21 U/L (ref 15–37)
BILIRUB SERPL-MCNC: 0.7 MG/DL (ref 0.2–1)
BUN SERPL-MCNC: 29 MG/DL (ref 6–20)
BUN/CREAT SERPL: 25 (ref 12–20)
CALCIUM SERPL-MCNC: 8.7 MG/DL (ref 8.5–10.1)
CHLORIDE SERPL-SCNC: 108 MMOL/L (ref 97–108)
CO2 SERPL-SCNC: 27 MMOL/L (ref 21–32)
CREAT SERPL-MCNC: 1.14 MG/DL (ref 0.55–1.02)
ERYTHROCYTE [DISTWIDTH] IN BLOOD BY AUTOMATED COUNT: 15.2 % (ref 11.5–14.5)
GLOBULIN SER CALC-MCNC: 2.9 G/DL (ref 2–4)
GLUCOSE SERPL-MCNC: 91 MG/DL (ref 65–100)
HCT VFR BLD AUTO: 29.3 % (ref 35–47)
HGB BLD-MCNC: 9.8 G/DL (ref 11.5–16)
MCH RBC QN AUTO: 35.6 PG (ref 26–34)
MCHC RBC AUTO-ENTMCNC: 33.4 G/DL (ref 30–36.5)
MCV RBC AUTO: 106.5 FL (ref 80–99)
NRBC # BLD: 0 K/UL (ref 0–0.01)
NRBC BLD-RTO: 0 PER 100 WBC
PLATELET # BLD AUTO: 110 K/UL (ref 150–400)
PMV BLD AUTO: 9.6 FL (ref 8.9–12.9)
POTASSIUM SERPL-SCNC: 4.1 MMOL/L (ref 3.5–5.1)
PROT SERPL-MCNC: 6.3 G/DL (ref 6.4–8.2)
RBC # BLD AUTO: 2.75 M/UL (ref 3.8–5.2)
SODIUM SERPL-SCNC: 143 MMOL/L (ref 136–145)
WBC # BLD AUTO: 4.5 K/UL (ref 3.6–11)

## 2018-02-28 PROCEDURE — 85027 COMPLETE CBC AUTOMATED: CPT | Performed by: OBSTETRICS & GYNECOLOGY

## 2018-02-28 PROCEDURE — 74011250637 HC RX REV CODE- 250/637: Performed by: OBSTETRICS & GYNECOLOGY

## 2018-02-28 PROCEDURE — 80053 COMPREHEN METABOLIC PANEL: CPT | Performed by: OBSTETRICS & GYNECOLOGY

## 2018-02-28 PROCEDURE — 77030027138 HC INCENT SPIROMETER -A

## 2018-02-28 PROCEDURE — 99218 HC RM OBSERVATION: CPT

## 2018-02-28 PROCEDURE — 36415 COLL VENOUS BLD VENIPUNCTURE: CPT | Performed by: OBSTETRICS & GYNECOLOGY

## 2018-02-28 RX ORDER — HYDROCODONE BITARTRATE AND ACETAMINOPHEN 5; 300 MG/1; MG/1
1 TABLET ORAL
Qty: 5 TAB | Refills: 0 | Status: SHIPPED | OUTPATIENT
Start: 2018-02-28

## 2018-02-28 RX ADMIN — ACETAMINOPHEN 650 MG: 325 TABLET ORAL at 05:28

## 2018-02-28 RX ADMIN — IBUPROFEN 800 MG: 400 TABLET, FILM COATED ORAL at 07:39

## 2018-02-28 RX ADMIN — Medication 10 ML: at 05:29

## 2018-02-28 RX ADMIN — BISACODYL 10 MG: 10 SUPPOSITORY RECTAL at 07:39

## 2018-02-28 RX ADMIN — PANTOPRAZOLE SODIUM 40 MG: 40 TABLET, DELAYED RELEASE ORAL at 09:57

## 2018-02-28 NOTE — DISCHARGE INSTRUCTIONS
No lifting >10 lbs x 6 weeks  No driving for 0-08 days or until off narcotic pain meds and can hit the brakes in an emergency  [id number] [address] Queen to shower tomorrow, no baths until incisions have healed  Can take off band-aids tomorrow, keep steri-strips on 7-10 days (until they start to peel off)  Take motrin 800 mg every 6 hrs and tylenol 650 mg every 6 hrs (both over the counter). Take percocet additionally if needed for pain. To treat or prevent constipation take (over the counter) senna 2 tabs each night, and additionally milk of magnesia or miralax if needed.

## 2018-02-28 NOTE — PROGRESS NOTES
Report received from Estefania calzada RN. SBAR and Kardex were discussed. Laurel Noland RN    11:46 AM pt tolerated breakfast and lunch, up in chair since 0800. Instructed on use of IS and preformed teach back. BP and oxygen saturations remain WNL. Son at bedside. 11:55 AM pt refused flu vaccine. 12:23 PM reviewed dc instructions with pt and pt's grandson. Opportunity for questions provided. Pt's IVs removed and tele returned. All belongings packed up. Volunteer request submitted for discharge. 12:30 PM anesthesiologist at bedside to see pt.

## 2018-02-28 NOTE — PROGRESS NOTES
PCU SHIFT NURSING NOTE      Bedside shift change report given to Arturo Galo (oncoming nurse) by Shayy Gallegos (offgoing nurse). Report included the following information SBAR. Shift Summary: 1930- pt not responding to light noxious stimuli. Annette Dewey is an anesthesiologists and provided fully detailed report. All details of care gone over and adjustments made. 2100- pt remains obtundant  2310-pt awake. Assisted x 1 to [facility]. drea well. Pt urinated. Ate some sherbet. All drea well. Grandson not wanting NSAIDS at this time. Extensive edu to pt to communicate pain and need for meds. 0500- easily aroused to get labs. drea well. Another sherbet cup. Admission Date 2/27/2018   Admission Diagnosis OVARIAN CANCER  Ovarian cancer ([facility] [address]   Consults None        Consults   []PT   []OT   []Speech   []Case Management      [] Palliative      Cardiac Monitoring Order   [x]Yes   []No     IV drips   []Yes grandson refusing. Drip:                            Dose:  Drip:                            Dose:  Drip:                            Dose:   []No     GI Prophylaxis   []Yes   []No         DVT Prophylaxis   SCDs:  Sequential Compression Device: Bilateral          Abhishek stockings:         [] Medication   []Contraindicated   []None      Activity Level Activity Level: Bed Rest     Activity Assistance: Partial (two people)   Purposeful Rounding every 1-2 hour? [x]Yes   Ross Score  Total Score: 3   Bed Alarm (If score 3 or >)   []Yes   [] Refused (See signed refusal form in chart)   Clyde Score  Clyde Score: 21   Clyde Score (if score 14 or less)   []PMT consult   []Wound Care consult      []Specialty bed   [] Nutrition consult          Needs prior to discharge:   Home O2 required:    [x]Yes   [x]No    If yes, how much O2 required?     Other:    Last Bowel Movement: Last Bowel Movement Date: 02/27/18      Influenza Vaccine Received Flu Vaccine for Current Season (usually Sept-March): No    Patient/Guardian Refused (Notifvipul MEDLEY): No Pneumonia Vaccine           Diet Active Orders   Diet    DIET REGULAR      LDAs             Venous Access Device Smart Port 10/26/17 Upper chest (subclavicular area, right (Active)   Central Line Being Utilized No 2/27/2018 12:28 PM      Peripheral IV 02/27/18 Left Antecubital (Active)   Site Assessment Clean, dry, & intact 2/28/2018  4:39 AM   Phlebitis Assessment 0 2/28/2018  4:39 AM   Infiltration Assessment 0 2/28/2018  4:39 AM   Dressing Status Clean, dry, & intact 2/28/2018  4:39 AM   Dressing Type Transparent 2/28/2018  4:39 AM   Hub Color/Line Status Pink; Infusing 2/27/2018 12:28 PM   Alcohol Cap Used Yes 2/28/2018  4:39 AM       Peripheral IV 02/27/18 Right Hand (Active)   Site Assessment Clean, dry, & intact 2/28/2018  4:39 AM   Phlebitis Assessment 0 2/28/2018  4:39 AM   Infiltration Assessment 0 2/28/2018  4:39 AM   Dressing Status Clean, dry, & intact 2/28/2018  4:39 AM   Dressing Type Transparent 2/28/2018  4:39 AM   Hub Color/Line Status Pink;Capped 2/27/2018 12:28 PM   Alcohol Cap Used Yes 2/28/2018  4:39 AM                      Urinary Catheter [REMOVED] Urinary Catheter 02/27/18 2- way; [place]-Criteria for Appropriate Use: Surgical procedure    Intake & Output   Date 02/27/18 0700 - 02/28/18 0659 02/28/18 0700 - 03/01/18 0659   Shift 5220-0719 1038-3535 24 Hour Total 3519-6046 6718-4666 24 Hour Total   I  N  T  A  K  E   I.V.  (mL/kg/hr) 1656.7  (1.8)  1656.7         Volume (lactated Ringers infusion) 1200  1200         Volume (dextrose 5% lactated ringers infusion) 456.7  456.7       Shift Total  (mL/kg) 1656.7  (21)  1656.7  (21)      O  U  T  P  U  T   Urine  (mL/kg/hr) 300  (0.3) 750 1050         Urine Voided  750 750         Urine Occurrence(s)  1 x 1 x         Urine Output 125  125         Urine Output (mL) ([REMOVED] Urinary Catheter 02/27/18 2- way; [place]) 175  175       Blood 50  50         Estimated Blood Loss 50  50       Shift Total  (mL/kg) 350  (4.4) 750  (9.5) 1100  (14)      NET 1306.7 -750 556.7      Weight (kg) 78.8 78.8 78.8 78.8 78.8 78.8         Readmission Risk Assessment Tool Score Medium Risk            18       Total Score        3 Has Seen PCP in Last 6 Months (Yes=3, No=0)    4 IP Visits Last 12 Months (1-3=4, 4=9, >4=11)    5 Pt. Coverage (Medicare=5 , Medicaid, or Self-Pay=4)    6 Charlson Comorbidity Score (Age + Comorbid Conditions)        Criteria that do not apply:    . Living with Significant Other. Assisted Living. LTAC. SNF.  or   Rehab    Patient Length of Stay (>5 days = 3)       Expected Length of Stay - - -   Actual Length of Stay 0

## 2018-02-28 NOTE — DISCHARGE SUMMARY
Discharge Summary     Patient: Nicole Brooks MRN: [id number]  SSN: [id number]    YOB: 1927  Age: 80 y.o. Sex: female       Admit Date: 2/27/2018    Discharge Date: 2/28/2018      Admission Diagnoses: OVARIAN CANCER  Ovarian cancer [facility])    Discharge Diagnoses: same  Problem List as of 2/28/2018  Date Reviewed: 12/20/2017          Codes Class Noted - Resolved    * (Principal)Ovarian cancer ([facility] ICD-10-CM: C56.9  ICD-9-CM: 183.0  2/27/2018 - Present        Ascites ICD-10-CM: R18.8  ICD-9-CM: 789.59  10/15/2017 - Present               Discharge Condition: Good    Hospital Course: 81 yo with ovarian cancer, who had good response to neoadjuvant chemotherapy, admitted for scheduled robotic BSO, omentectomy. Procedure was uncomplicated. Discharged in stable condition after overnight observation. Consults: None    Significant Diagnostic Studies: none    Disposition: home    Discharge Medications:   Current Discharge Medication List      START taking these medications    Details   HYDROcodone-acetaminophen (VICODIN) 5-300 mg tablet Take 1 Tab by mouth every four (4) hours as needed. Max Daily Amount: 6 Tabs. Qty: 5 Tab, Refills: 0    Associated Diagnoses: Malignant neoplasm of ovary, unspecified laterality ([facility]         CONTINUE these medications which have NOT CHANGED    Details   atorvastatin (LIPITOR) 20 mg tablet Take 20 mg by mouth daily. pantoprazole (PROTONIX) 40 mg tablet Take 40 mg by mouth daily. potassium chloride SR (KLOR-CON 10) 10 mEq tablet Take 10 mEq by mouth daily. ezetimibe-simvastatin (VYTORIN 10/10) 10-10 mg per tablet Take 1 Tab by mouth nightly.      magnesium oxide (MAG-OX) 400 mg tablet Take 400 mg by mouth daily. hydrOXYzine HCl (ATARAX) 25 mg tablet Take 25 mg by mouth three (3) times daily as needed for Itching. Indications: anxiety      sertraline (ZOLOFT) 50 mg tablet Take 50 mg by mouth daily.       cholecalciferol, vitamin D3, (VITAMIN D3) 2,000 unit tab Take 1 Tab by mouth daily. aspirin 81 mg chewable tablet Take 81 mg by mouth daily. amLODIPine (NORVASC) 2.5 mg tablet Take 2.5 mg by mouth daily. omega-3 fatty acids-vitamin e (FISH OIL) 1,000 mg cap Take 1 Cap by mouth daily. Activity: See surgical instructions  Diet: Regular Diet  Wound Care: see surgical instructions    Follow-up Appointments   Procedures    FOLLOW UP VISIT Appointment in: One Week As scheduled     As scheduled     Standing Status:   Standing     Number of Occurrences:   1     Order Specific Question:   Appointment in     Answer:    One Week       Signed By: Pema Iverson MD     February 28, 2018

## 2018-02-28 NOTE — PROGRESS NOTES
Pt is a 81 y/o female that was admitted under OBS status on 2/27/18 d/t dx of Ovarian Cancer. Pt is Active with PCP. Pt is alert and oriented. CM issued State and [place] OBS letter. Signed copy on bedside chart. Pt lives alone but son has been staying with her. She lives in one story home with 5 [address]. Pt has a cane at home. Pt has no experience with HH. Pt has used Outpatient Rehab in the past d/t back pain. No experience with SNF. Pt indicated that she was I W ADLs. Pt can drive but does not let you know. Son transports her in the car. Pt uses [employer]. Fany To lives in [place], [place] and is MD and helps to handle Medical Issues: [phone]. He was present in the room and is in agreement of discharge plans. Pt indicated that her son will be here to take her home at 1 PM today. CM noted that follow up with Dr. Stanislaw Keller is made and on AVS.      No further CM needs. Care Management Interventions  PCP Verified by CM: Yes  Mode of Transport at Discharge: Other (see comment) (Family is going to transport home in car. )  Transition of Care Consult (CM Consult): Discharge Planning  MyChart Signup: Yes  Discharge Durable Medical Equipment: No  Physical Therapy Consult: No  Occupational Therapy Consult: No  Speech Therapy Consult: No  Current Support Network: Own Home, Lives Alone, Family Lives Nearby (Pt lives alone. son has been staying at night. Lives in one story home with 5 [address].   Sons/Grandson's are suportive.  )  Confirm Follow Up Transport: Family  Plan discussed with Pt/Family/Caregiver: Yes  Freedom of Choice Offered: Yes  Discharge Location  Discharge Placement: Home with family assistance    Jayson [address]

## 2018-06-04 ENCOUNTER — HOSPITAL ENCOUNTER (OUTPATIENT)
Dept: CT IMAGING | Age: 83
Discharge: HOME | End: 2018-06-04
Attending: OBSTETRICS & GYNECOLOGY
Payer: MEDICARE

## 2018-06-04 DIAGNOSIS — C56.1 MALIGNANT NEOPLASM OF RIGHT OVARY (HCC): ICD-10-CM

## 2018-06-04 DIAGNOSIS — C56.2 MALIGNANT NEOPLASM OF LEFT OVARY (HCC): ICD-10-CM

## 2018-06-04 DIAGNOSIS — J90 PLEURAL EFFUSION: ICD-10-CM

## 2018-06-04 PROCEDURE — 74177 CT ABD & PELVIS W/CONTRAST: CPT

## 2018-06-04 PROCEDURE — 82565 ASSAY OF CREATININE: CPT

## 2018-06-04 PROCEDURE — 71260 CT THORAX DX C+: CPT

## 2018-06-04 PROCEDURE — 74011636320 HC RX REV CODE- 636/320: Performed by: OBSTETRICS & GYNECOLOGY

## 2018-06-04 PROCEDURE — 74011250636 HC RX REV CODE- 250/636: Performed by: OBSTETRICS & GYNECOLOGY

## 2018-06-04 RX ORDER — SODIUM CHLORIDE 9 MG/ML
50 INJECTION, SOLUTION INTRAVENOUS
Status: COMPLETED | OUTPATIENT
Start: 2018-06-04 | End: 2018-06-04

## 2018-06-04 RX ORDER — SODIUM CHLORIDE 0.9 % (FLUSH) 0.9 %
10 SYRINGE (ML) INJECTION
Status: COMPLETED | OUTPATIENT
Start: 2018-06-04 | End: 2018-06-04

## 2018-06-04 RX ADMIN — SODIUM CHLORIDE 50 ML/HR: 900 INJECTION, SOLUTION INTRAVENOUS at 13:03

## 2018-06-04 RX ADMIN — IOHEXOL 40 ML: 240 INJECTION, SOLUTION INTRATHECAL; INTRAVASCULAR; INTRAVENOUS; ORAL at 13:04

## 2018-06-04 RX ADMIN — Medication 10 ML: at 13:03

## 2018-06-04 RX ADMIN — IOPAMIDOL 100 ML: 755 INJECTION, SOLUTION INTRAVENOUS at 13:03

## 2018-06-05 LAB — CREAT BLD-MCNC: 1.1 MG/DL (ref 0.6–1.3)

## 2019-10-06 ENCOUNTER — HOSPITAL ENCOUNTER (OUTPATIENT)
Dept: CT IMAGING | Age: 84
Discharge: HOME | End: 2019-10-06
Attending: OBSTETRICS & GYNECOLOGY
Payer: MEDICARE

## 2019-10-06 DIAGNOSIS — C56.1 MALIGNANT NEOPLASM OF RIGHT OVARY (HCC): ICD-10-CM

## 2019-10-06 DIAGNOSIS — C56.2 MALIGNANT NEOPLASM OF LEFT OVARY (HCC): ICD-10-CM

## 2019-10-06 PROCEDURE — 74177 CT ABD & PELVIS W/CONTRAST: CPT

## 2019-10-06 PROCEDURE — 82565 ASSAY OF CREATININE: CPT

## 2019-10-06 PROCEDURE — 74011636320 HC RX REV CODE- 636/320: Performed by: RADIOLOGY

## 2019-10-06 RX ADMIN — IOPAMIDOL 100 ML: 755 INJECTION, SOLUTION INTRAVENOUS at 12:52

## 2019-10-07 LAB — CREAT BLD-MCNC: 1.1 MG/DL (ref 0.6–1.3)

## 2019-12-11 ENCOUNTER — HOSPITAL ENCOUNTER (OUTPATIENT)
Dept: CT IMAGING | Age: 84
End: 2019-12-11
Attending: OBSTETRICS & GYNECOLOGY
Payer: MEDICARE

## 2019-12-11 ENCOUNTER — HOSPITAL ENCOUNTER (OUTPATIENT)
Dept: CT IMAGING | Age: 84
Discharge: HOME | End: 2019-12-11
Attending: OBSTETRICS & GYNECOLOGY
Payer: MEDICARE

## 2019-12-11 DIAGNOSIS — C56.1 MALIGNANT NEOPLASM OF RIGHT OVARY (HCC): ICD-10-CM

## 2019-12-11 DIAGNOSIS — C56.2 MALIGNANT NEOPLASM OF LEFT OVARY (HCC): ICD-10-CM

## 2019-12-11 DIAGNOSIS — R11.2 NAUSEA WITH VOMITING: ICD-10-CM

## 2019-12-11 DIAGNOSIS — Z51.11 ENCOUNTER FOR ANTINEOPLASTIC CHEMOTHERAPY: ICD-10-CM

## 2019-12-11 PROCEDURE — 74011636320 HC RX REV CODE- 636/320: Performed by: OBSTETRICS & GYNECOLOGY

## 2019-12-11 PROCEDURE — 71260 CT THORAX DX C+: CPT

## 2019-12-11 PROCEDURE — 74177 CT ABD & PELVIS W/CONTRAST: CPT

## 2019-12-11 RX ORDER — SODIUM CHLORIDE 0.9 % (FLUSH) 0.9 %
10 SYRINGE (ML) INJECTION
Status: DISCONTINUED | OUTPATIENT
Start: 2019-12-11 | End: 2019-12-12 | Stop reason: HOSPADM

## 2019-12-11 RX ADMIN — IOHEXOL 50 ML: 240 INJECTION, SOLUTION INTRATHECAL; INTRAVASCULAR; INTRAVENOUS; ORAL at 16:52

## 2019-12-11 RX ADMIN — IOPAMIDOL 100 ML: 755 INJECTION, SOLUTION INTRAVENOUS at 16:51

## 2020-02-19 ENCOUNTER — HOSPITAL ENCOUNTER (OUTPATIENT)
Dept: CT IMAGING | Age: 85
Discharge: HOME | End: 2020-02-19
Attending: OBSTETRICS & GYNECOLOGY
Payer: MEDICARE

## 2020-02-19 ENCOUNTER — HOSPITAL ENCOUNTER (OUTPATIENT)
Dept: CT IMAGING | Age: 85
End: 2020-02-19
Attending: OBSTETRICS & GYNECOLOGY
Payer: MEDICARE

## 2020-02-19 DIAGNOSIS — C56.1 MALIGNANT NEOPLASM OF RIGHT OVARY (HCC): ICD-10-CM

## 2020-02-19 DIAGNOSIS — C56.2 MALIGNANT NEOPLASM OF LEFT OVARY (HCC): ICD-10-CM

## 2020-02-19 DIAGNOSIS — Z51.11 ENCOUNTER FOR ANTINEOPLASTIC CHEMOTHERAPY: ICD-10-CM

## 2020-02-19 PROCEDURE — 74011636320 HC RX REV CODE- 636/320: Performed by: OBSTETRICS & GYNECOLOGY

## 2020-02-19 PROCEDURE — 74177 CT ABD & PELVIS W/CONTRAST: CPT

## 2020-02-19 PROCEDURE — 71260 CT THORAX DX C+: CPT

## 2020-02-19 RX ORDER — SODIUM CHLORIDE 0.9 % (FLUSH) 0.9 %
10 SYRINGE (ML) INJECTION
Status: COMPLETED | OUTPATIENT
Start: 2020-02-19 | End: 2020-02-19

## 2020-02-19 RX ADMIN — Medication 10 ML: at 12:44

## 2020-02-19 RX ADMIN — IOPAMIDOL 100 ML: 755 INJECTION, SOLUTION INTRAVENOUS at 12:44

## 2020-02-19 RX ADMIN — IOHEXOL 50 ML: 240 INJECTION, SOLUTION INTRATHECAL; INTRAVASCULAR; INTRAVENOUS; ORAL at 12:44

## 2020-06-03 ENCOUNTER — HOSPITAL ENCOUNTER (OUTPATIENT)
Dept: CT IMAGING | Age: 85
Discharge: HOME | End: 2020-06-03
Attending: OBSTETRICS & GYNECOLOGY
Payer: MEDICARE

## 2020-06-03 DIAGNOSIS — C56.2 MALIGNANT NEOPLASM OF LEFT OVARY (HCC): ICD-10-CM

## 2020-06-03 DIAGNOSIS — C56.1 MALIGNANT NEOPLASM OF RIGHT OVARY (HCC): ICD-10-CM

## 2020-06-03 LAB — CREAT BLD-MCNC: 1.5 MG/DL (ref 0.6–1.3)

## 2020-06-03 PROCEDURE — 71260 CT THORAX DX C+: CPT

## 2020-06-03 PROCEDURE — 82565 ASSAY OF CREATININE: CPT

## 2020-06-03 PROCEDURE — 74011000255 HC RX REV CODE- 255: Performed by: OBSTETRICS & GYNECOLOGY

## 2020-06-03 PROCEDURE — 74011636320 HC RX REV CODE- 636/320: Performed by: OBSTETRICS & GYNECOLOGY

## 2020-06-03 PROCEDURE — 74177 CT ABD & PELVIS W/CONTRAST: CPT

## 2020-06-03 RX ORDER — BARIUM SULFATE 20 MG/ML
900 SUSPENSION ORAL
Status: COMPLETED | OUTPATIENT
Start: 2020-06-03 | End: 2020-06-03

## 2020-06-03 RX ORDER — SODIUM CHLORIDE 0.9 % (FLUSH) 0.9 %
10 SYRINGE (ML) INJECTION
Status: COMPLETED | OUTPATIENT
Start: 2020-06-03 | End: 2020-06-03

## 2020-06-03 RX ADMIN — BARIUM SULFATE 600 ML: 21 SUSPENSION ORAL at 07:12

## 2020-06-03 RX ADMIN — Medication 10 ML: at 07:12

## 2020-06-03 RX ADMIN — IOPAMIDOL 65 ML: 755 INJECTION, SOLUTION INTRAVENOUS at 07:12

## 2020-09-14 ENCOUNTER — HOSPITAL ENCOUNTER (OUTPATIENT)
Dept: CT IMAGING | Age: 85
Discharge: HOME | End: 2020-09-14
Attending: OBSTETRICS & GYNECOLOGY
Payer: MEDICARE

## 2020-09-14 DIAGNOSIS — C56.1 MALIGNANT NEOPLASM OF RIGHT OVARY (HCC): ICD-10-CM

## 2020-09-14 DIAGNOSIS — R97.1 ELEVATED CANCER ANTIGEN 125 (CA 125): ICD-10-CM

## 2020-09-14 DIAGNOSIS — C56.2 MALIGNANT NEOPLASM OF LEFT OVARY (HCC): ICD-10-CM

## 2020-09-14 PROCEDURE — 74011000255 HC RX REV CODE- 255: Performed by: OBSTETRICS & GYNECOLOGY

## 2020-09-14 PROCEDURE — 71260 CT THORAX DX C+: CPT

## 2020-09-14 PROCEDURE — 74177 CT ABD & PELVIS W/CONTRAST: CPT

## 2020-09-14 PROCEDURE — 74011000636 HC RX REV CODE- 636: Performed by: OBSTETRICS & GYNECOLOGY

## 2020-09-14 RX ORDER — BARIUM SULFATE 20 MG/ML
900 SUSPENSION ORAL
Status: COMPLETED | OUTPATIENT
Start: 2020-09-14 | End: 2020-09-14

## 2020-09-14 RX ORDER — SODIUM CHLORIDE 0.9 % (FLUSH) 0.9 %
10 SYRINGE (ML) INJECTION
Status: COMPLETED | OUTPATIENT
Start: 2020-09-14 | End: 2020-09-14

## 2020-09-14 RX ADMIN — Medication 10 ML: at 06:59

## 2020-09-14 RX ADMIN — IOPAMIDOL 100 ML: 755 INJECTION, SOLUTION INTRAVENOUS at 06:59

## 2020-09-14 RX ADMIN — BARIUM SULFATE 900 ML: 21 SUSPENSION ORAL at 06:59

## 2020-12-11 ENCOUNTER — HOSPITAL ENCOUNTER (OUTPATIENT)
Dept: CT IMAGING | Age: 85
Discharge: HOME | End: 2020-12-11
Attending: OBSTETRICS & GYNECOLOGY
Payer: MEDICARE

## 2020-12-11 DIAGNOSIS — C56.1 MALIGNANT NEOPLASM OF RIGHT OVARY (HCC): ICD-10-CM

## 2020-12-11 DIAGNOSIS — R97.1 ELEVATED CANCER ANTIGEN 125 (CA 125): ICD-10-CM

## 2020-12-11 DIAGNOSIS — C56.2 MALIGNANT NEOPLASM OF LEFT OVARY (HCC): ICD-10-CM

## 2020-12-11 LAB — CREAT BLD-MCNC: 1.5 MG/DL (ref 0.6–1.3)

## 2020-12-11 PROCEDURE — 74177 CT ABD & PELVIS W/CONTRAST: CPT

## 2020-12-11 PROCEDURE — 74011000636 HC RX REV CODE- 636: Performed by: OBSTETRICS & GYNECOLOGY

## 2020-12-11 PROCEDURE — 71260 CT THORAX DX C+: CPT

## 2020-12-11 PROCEDURE — 82565 ASSAY OF CREATININE: CPT

## 2020-12-11 PROCEDURE — 74011000255 HC RX REV CODE- 255: Performed by: OBSTETRICS & GYNECOLOGY

## 2020-12-11 RX ORDER — BARIUM SULFATE 20 MG/ML
900 SUSPENSION ORAL
Status: COMPLETED | OUTPATIENT
Start: 2020-12-11 | End: 2020-12-11

## 2020-12-11 RX ADMIN — IOPAMIDOL 100 ML: 755 INJECTION, SOLUTION INTRAVENOUS at 07:18

## 2020-12-11 RX ADMIN — BARIUM SULFATE 900 ML: 21 SUSPENSION ORAL at 07:18

## 2022-03-19 PROBLEM — R18.8 ASCITES: Status: ACTIVE | Noted: 2017-10-15

## 2022-03-20 PROBLEM — C56.9 OVARIAN CANCER (HCC): Status: ACTIVE | Noted: 2018-02-27

## 2022-04-08 ENCOUNTER — TRANSCRIBE ORDER (OUTPATIENT)
Dept: SCHEDULING | Age: 87
End: 2022-04-08

## 2022-04-08 DIAGNOSIS — C56.1 MALIGNANT NEOPLASM OF RIGHT OVARY (HCC): Primary | ICD-10-CM

## 2022-04-08 DIAGNOSIS — C56.2 MALIGNANT NEOPLASM OF LEFT OVARY (HCC): ICD-10-CM

## 2022-04-08 DIAGNOSIS — Z79.890 POSTMENOPAUSAL HORMONE THERAPY: ICD-10-CM

## 2022-04-08 DIAGNOSIS — R97.1 ELEVATED CANCER ANTIGEN 125 (CA 125): ICD-10-CM

## 2022-10-04 NOTE — PROGRESS NOTES
Initial Nutrition Assessment:    INTERVENTIONS/RECOMMENDATIONS:   · Continue current diet  · Encourage PO intake    ASSESSMENT:   Chart reviewed, medically noted for ascites s/p paracentesis, abdominal carcinomatosis. Pt was resting during visit but family member reports that pt consumed 100% of breakfast this morning. Previous poor PO intake likely related to ascites. Will continue to follow PO intake. Past Medical History:   Diagnosis Date    Autoimmune disease ([address]     Chronic kidney disease     GERD (gastroesophageal reflux disease)     Hypertension     Murmur        Diet Order: Cardiac  % Eaten:  Patient Vitals for the past 72 hrs:   % Diet Eaten   10/19/17 0843 100 %     Pertinent Medications: [x]Reviewed: lasix, lactobac, miralax, PPI  Pertinent Labs: [x]Reviewed:   Food Allergies: [x]NKFA  []Other   Last BM:  10/17  Edema:   1+     []RUE   []LUE   []RLE   []LLE      Pressure Ulcer:      [] Stage I   [] Stage II   [] Stage III   [] Stage IV      Wt Readings from Last 30 Encounters:   10/16/17 88.3 kg (194 lb 10.7 oz)   02/21/16 83.9 kg (185 lb)   03/24/14 82.5 kg (181 lb 12.8 oz)       Anthropometrics:   Height: 5' 3\" (160 cm) Weight: 88.3 kg (194 lb 10.7 oz)   IBW (%IBW):   ( ) UBW (%UBW): 81.6 kg (180 lb) (  %)   Last Weight Metrics:  Weight Loss Metrics 10/16/2017 2/21/2016 3/24/2014   Today's Wt 194 lb 10.7 oz 185 lb 181 lb 12.8 oz   BMI 34.48 kg/m2 32.78 kg/m2 32.21 kg/m2       BMI: Body mass index is 34.48 kg/(m^2). This BMI is indicative of:   []Underweight    []Normal    []Overweight    [x] Obesity   [] Extreme Obesity (BMI>40)     Estimated Nutrition Needs (Based on):   1525 Kcals/day (MSJ: 1270 x 1.2) , 70 g Protein  Carbohydrate:  At Least 130 g/day  Fluids: 1525 mL/day (1ml/kcal) or per primary team    NUTRITION DIAGNOSES:   Problem:  No nutritional diagnosis at this time      Etiology: related to       Signs/Symptoms: as evidenced by        NUTRITION INTERVENTIONS:  Meals/Snacks: General/healthful diet                  GOAL:   consume >50% of meals and ONS in 3-5 days    LEARNING NEEDS (Diet, Food/Nutrient-Drug Interaction):    [x] None Identified   [] Identified and Education Provided/Documented   [] Identified and Pt declined/was not appropriate     Cultureal, Sabianist, OR Ethnic Dietary Needs:    [x] None Identified   [] Identified and Addressed     [x] Interdisciplinary Care Plan Reviewed/Documented    [x] Discharge Planning:  General healthy diet     MONITORING /EVALUATION:      Food/Nutrient Intake Outcomes:  Total energy intake  Physical Signs/Symptoms Outcomes: Weight/weight change, Electrolyte and renal profile, GI profile, Glucose profile, GI    NUTRITION RISK:    [] High              [x] Moderate           []  Low  []  Minimal/Uncompromised    PT SEEN FOR:    []  MD Consult: []Calorie Count      []Diabetic Diet Education        []Diet Education     []Electrolyte Management     []General Nutrition Management and Supplements     []Management of Tube Feeding     []TPN Recommendations    []  RN Referral:  []MST score >=2     []Enteral/Parenteral Nutrition PTA     []Pregnant: Gestational DM or Multigestation     []Pressure Ulcer/Wound Care needs        []  Low BMI  [x]  DTR Referral       Renae Sol RDN  Pager [phone]  Weekend Pager [phone] Quality 226: Preventive Care And Screening: Tobacco Use: Screening And Cessation Intervention: Patient screened for tobacco use and is an ex/non-smoker Detail Level: Detailed Quality 130: Documentation Of Current Medications In The Medical Record: Current Medications Documented Quality 431: Preventive Care And Screening: Unhealthy Alcohol Use - Screening: Patient screened for unhealthy alcohol use using a single question and scores less than 2 times per year

## 2023-01-05 ENCOUNTER — TRANSCRIBE ORDER (OUTPATIENT)
Dept: SCHEDULING | Age: 88
End: 2023-01-05

## 2023-01-05 DIAGNOSIS — C56.1 MALIGNANT NEOPLASM OF RIGHT OVARY (HCC): Primary | ICD-10-CM

## 2023-01-05 DIAGNOSIS — R97.1 ELEVATED CANCER ANTIGEN 125 (CA 125): ICD-10-CM

## 2023-01-05 DIAGNOSIS — C56.2 MALIGNANT NEOPLASM OF LEFT OVARY (HCC): ICD-10-CM

## 2023-01-19 ENCOUNTER — HOSPITAL ENCOUNTER (OUTPATIENT)
Dept: CT IMAGING | Age: 88
Discharge: HOME | End: 2023-01-19
Attending: OBSTETRICS & GYNECOLOGY
Payer: MEDICARE

## 2023-01-19 DIAGNOSIS — C56.2 MALIGNANT NEOPLASM OF LEFT OVARY (HCC): ICD-10-CM

## 2023-01-19 DIAGNOSIS — C56.1 MALIGNANT NEOPLASM OF RIGHT OVARY (HCC): ICD-10-CM

## 2023-01-19 DIAGNOSIS — R97.1 ELEVATED CANCER ANTIGEN 125 (CA 125): ICD-10-CM

## 2023-01-19 PROCEDURE — 71260 CT THORAX DX C+: CPT

## 2023-01-19 PROCEDURE — 74011000636 HC RX REV CODE- 636: Performed by: OBSTETRICS & GYNECOLOGY

## 2023-01-19 PROCEDURE — 74177 CT ABD & PELVIS W/CONTRAST: CPT

## 2023-01-19 RX ADMIN — IOPAMIDOL 100 ML: 755 INJECTION, SOLUTION INTRAVENOUS at 13:58

## (undated) DEVICE — DRAPE KIT ACCS 4-ARM DISP -- DA VINCI

## (undated) DEVICE — TOWEL SURG W17XL27IN STD BLU COT NONFENESTRATED PREWASHED

## (undated) DEVICE — FENESTRATED BIPOLAR FORCEPS: Brand: ENDOWRIST

## (undated) DEVICE — AIRSEAL 12 MM ACCESS PORT AND PALM GRIP OBTURATOR WITH BLADELESS OPTICAL TIP, 120 MM LENGTH: Brand: AIRSEAL

## (undated) DEVICE — STERILE POLYISOPRENE POWDER-FREE SURGICAL GLOVES: Brand: PROTEXIS

## (undated) DEVICE — SYR 10ML LUER LOK 1/5ML GRAD --

## (undated) DEVICE — BAG SPEC RETRV 275ML 10ML DISPOSABLE RELIACATCH

## (undated) DEVICE — COVER,MAYO STAND,STERILE: Brand: MEDLINE

## (undated) DEVICE — STERILE POLYISOPRENE POWDER-FREE SURGICAL GLOVES WITH EMOLLIENT COATING: Brand: PROTEXIS

## (undated) DEVICE — Device

## (undated) DEVICE — AGENT HEMSTAT 3GM PURIFIED PLNT STARCH PWD ABSRB ARISTA AH

## (undated) DEVICE — MASTISOL ADHESIVE LIQ 2/3ML

## (undated) DEVICE — ARM DRAPE

## (undated) DEVICE — SCISSORS SURG DIA8MM MPLR CRV ENDOWRIST

## (undated) DEVICE — TRAY PREP DRY W/ PREM GLV 2 APPL 6 SPNG 2 UNDPD 1 OVERWRAP

## (undated) DEVICE — INFECTION CONTROL KIT SYS

## (undated) DEVICE — NEEDLE HYPO 21GA L1.5IN INTRAMUSCULAR S STL LATCH BVL UP

## (undated) DEVICE — TIP COVER ACCESSORY

## (undated) DEVICE — SYSTEM FASCIAL CLSR UNIQUE SHLDED WNG SAFE UNIF CONSISTENT

## (undated) DEVICE — HANDLE LT SNAP ON ULT DURABLE LENS FOR TRUMPF ALC DISPOSABLE

## (undated) DEVICE — PROGRASP FORCEPS: Brand: ENDOWRIST

## (undated) DEVICE — VISUALIZATION SYSTEM: Brand: CLEARIFY

## (undated) DEVICE — POUCH SPEC RETRV SHFT 15MM 13X23CM GRN POLYUR DBL RNG HNDL

## (undated) DEVICE — REM POLYHESIVE ADULT PATIENT RETURN ELECTRODE: Brand: VALLEYLAB

## (undated) DEVICE — (D)STRIP SKN CLSR 0.5X4IN WHT --

## (undated) DEVICE — DRAPE,REIN 53X77,STERILE: Brand: MEDLINE

## (undated) DEVICE — SEAL UNIV 5-8MM DISP BX/10 -- DA VINCI XI - SNGL USE

## (undated) DEVICE — COLUMN DRAPE

## (undated) DEVICE — ELECTRO LUBE IS A SINGLE PATIENT USE DEVICE THAT IS INTENDED TO BE USED ON ELECTROSURGICAL ELECTRODES TO REDUCE STICKING.: Brand: KEY SURGICAL ELECTRO LUBE

## (undated) DEVICE — TRI-LUMEN FILTERED TUBE SET WITH ACTIVATED CHARCOAL FILTER: Brand: AIRSEAL

## (undated) DEVICE — (D)PREP SKN CHLRAPRP APPL 26ML -- CONVERT TO ITEM 371833

## (undated) DEVICE — SOLUTION IRRIGATION NACL 0.9% 1000 ML FLX CONTAINER

## (undated) DEVICE — SURGICAL PROCEDURE PACK GYN LAPAROSCOPY CUST SMH LF

## (undated) DEVICE — SPECIMEN RETRIEVAL POUCH: Brand: ENDO CATCH GOLD

## (undated) DEVICE — KENDALL SCD EXPRESS SLEEVES, KNEE LENGTH, MEDIUM: Brand: KENDALL SCD

## (undated) DEVICE — COVER,TABLE,60X90,STERILE: Brand: MEDLINE

## (undated) DEVICE — INSUFFLATION NEEDLE: Brand: SURGINEEDLE

## (undated) DEVICE — BLADELESS OBTURATOR: Brand: WECK VISTA

## (undated) DEVICE — SUTURE MCRYL SZ 4-0 L27IN ABSRB UD L19MM PS-2 1/2 CIR PRIM Y426H

## (undated) DEVICE — TRAY CATH 16F DRN BG LTX -- CONVERT TO ITEM 363158